# Patient Record
Sex: MALE | Race: BLACK OR AFRICAN AMERICAN | NOT HISPANIC OR LATINO | Employment: UNEMPLOYED | URBAN - METROPOLITAN AREA
[De-identification: names, ages, dates, MRNs, and addresses within clinical notes are randomized per-mention and may not be internally consistent; named-entity substitution may affect disease eponyms.]

---

## 2017-03-17 ENCOUNTER — OFFICE VISIT (OUTPATIENT)
Dept: FAMILY MEDICINE | Facility: CLINIC | Age: 37
End: 2017-03-17
Payer: COMMERCIAL

## 2017-03-17 VITALS
DIASTOLIC BLOOD PRESSURE: 90 MMHG | OXYGEN SATURATION: 97 % | TEMPERATURE: 99 F | SYSTOLIC BLOOD PRESSURE: 130 MMHG | BODY MASS INDEX: 29.83 KG/M2 | HEIGHT: 73 IN | HEART RATE: 64 BPM | WEIGHT: 225.06 LBS

## 2017-03-17 DIAGNOSIS — F43.0 ACUTE STRESS REACTION: Primary | ICD-10-CM

## 2017-03-17 DIAGNOSIS — G47.00 INSOMNIA, UNSPECIFIED TYPE: ICD-10-CM

## 2017-03-17 PROCEDURE — 99213 OFFICE O/P EST LOW 20 MIN: CPT | Mod: S$GLB,,, | Performed by: FAMILY MEDICINE

## 2017-03-17 PROCEDURE — 99999 PR PBB SHADOW E&M-EST. PATIENT-LVL III: CPT | Mod: PBBFAC,,, | Performed by: FAMILY MEDICINE

## 2017-03-17 PROCEDURE — 1160F RVW MEDS BY RX/DR IN RCRD: CPT | Mod: S$GLB,,, | Performed by: FAMILY MEDICINE

## 2017-03-17 RX ORDER — ZOLPIDEM TARTRATE 5 MG/1
5 TABLET ORAL NIGHTLY PRN
Qty: 30 TABLET | Refills: 2 | Status: SHIPPED | OUTPATIENT
Start: 2017-03-17 | End: 2017-05-03 | Stop reason: SDUPTHER

## 2017-03-17 RX ORDER — BUPROPION HYDROCHLORIDE 75 MG/1
75 TABLET ORAL 2 TIMES DAILY
Qty: 60 TABLET | Refills: 5 | Status: SHIPPED | OUTPATIENT
Start: 2017-03-17 | End: 2018-08-08 | Stop reason: SDUPTHER

## 2017-03-17 NOTE — PROGRESS NOTES
"Subjective:       Patient ID: Slime Bradshaw is a 36 y.o. male.    Chief Complaint: Headache and Blood Pressure Check    HPI Comments: Patient presents for concerns about his blood pressure and headaches. He states he and his new wife and him have been having issues. They have been to marriage counseling, but it has been stressful. He has noticed his blood pressure has elevated and he has been feeling down. He has not been sleeping and has been having more headaches. He states his family has been noticing that he has been down as well. He also has been fatigued and has not been sleeping at night.     Review of Systems    Objective:       Vitals:    03/17/17 1120   BP: (!) 130/90   Pulse: 64   Temp: 98.5 °F (36.9 °C)   TempSrc: Oral   SpO2: 97%   Weight: 102.1 kg (225 lb 1.4 oz)   Height: 6' 1" (1.854 m)       Physical Exam   Constitutional: He is oriented to person, place, and time. He appears well-developed and well-nourished. No distress.   HENT:   Head: Normocephalic and atraumatic.   Neck: Normal range of motion. Neck supple.   Neurological: He is alert and oriented to person, place, and time.   Skin: He is not diaphoretic.   Psychiatric: His speech is normal and behavior is normal. Judgment and thought content normal. His mood appears not anxious. His affect is not angry, not blunt, not labile and not inappropriate. Cognition and memory are normal. He exhibits a depressed mood.       Assessment:       1. Acute stress reaction    2. Insomnia, unspecified type        Plan:       Slime was seen today for headache and blood pressure check.    Diagnoses and all orders for this visit:    Acute stress reaction  -     buPROPion (WELLBUTRIN) 75 MG tablet; Take 1 tablet (75 mg total) by mouth 2 (two) times daily.  I started him on Wellbutrin and Ambien.    Insomnia, unspecified type  -     zolpidem (AMBIEN) 5 MG Tab; Take 1 tablet (5 mg total) by mouth nightly as needed.      Return in about 1 month (around " 4/17/2017).

## 2017-03-17 NOTE — PROGRESS NOTES
Subjective:       Patient ID: Slime Bradshaw is a 36 y.o. male.    Chief Complaint: Headache and Blood Pressure Check    HPI  Review of Systems    Objective:      Physical Exam    Assessment:       No diagnosis found.    Plan:       ***

## 2017-05-03 ENCOUNTER — OFFICE VISIT (OUTPATIENT)
Dept: FAMILY MEDICINE | Facility: CLINIC | Age: 37
End: 2017-05-03
Payer: COMMERCIAL

## 2017-05-03 VITALS
SYSTOLIC BLOOD PRESSURE: 128 MMHG | OXYGEN SATURATION: 96 % | HEART RATE: 63 BPM | DIASTOLIC BLOOD PRESSURE: 80 MMHG | TEMPERATURE: 98 F | BODY MASS INDEX: 29.42 KG/M2 | WEIGHT: 222 LBS | HEIGHT: 73 IN

## 2017-05-03 DIAGNOSIS — G47.00 INSOMNIA, UNSPECIFIED TYPE: ICD-10-CM

## 2017-05-03 DIAGNOSIS — L03.90 CELLULITIS, UNSPECIFIED CELLULITIS SITE: Primary | ICD-10-CM

## 2017-05-03 PROCEDURE — 99999 PR PBB SHADOW E&M-EST. PATIENT-LVL III: CPT | Mod: PBBFAC,,, | Performed by: FAMILY MEDICINE

## 2017-05-03 PROCEDURE — 1160F RVW MEDS BY RX/DR IN RCRD: CPT | Mod: S$GLB,,, | Performed by: FAMILY MEDICINE

## 2017-05-03 PROCEDURE — 99214 OFFICE O/P EST MOD 30 MIN: CPT | Mod: S$GLB,,, | Performed by: FAMILY MEDICINE

## 2017-05-03 RX ORDER — ZOLPIDEM TARTRATE 5 MG/1
5 TABLET ORAL NIGHTLY PRN
Qty: 30 TABLET | Refills: 2 | Status: SHIPPED | OUTPATIENT
Start: 2017-05-03 | End: 2017-05-03

## 2017-05-03 RX ORDER — ZOLPIDEM TARTRATE 12.5 MG/1
12.5 TABLET, FILM COATED, EXTENDED RELEASE ORAL NIGHTLY PRN
Qty: 30 TABLET | Refills: 2 | Status: SHIPPED | OUTPATIENT
Start: 2017-05-03 | End: 2017-07-10

## 2017-05-03 RX ORDER — DOXYCYCLINE HYCLATE 100 MG
100 TABLET ORAL EVERY 12 HOURS
Qty: 14 TABLET | Refills: 0 | Status: SHIPPED | OUTPATIENT
Start: 2017-05-03 | End: 2017-05-10

## 2017-05-03 NOTE — PROGRESS NOTES
"Subjective:       Patient ID: Slime Bradshaw is a 36 y.o. male.    Chief Complaint: Right Arm Laceration    HPI Comments: patient is here for  follow up. He is doing better as far as his relationship is concerned, but is still somewhat snappy. He is only taking one Wellbutrin at 75 mg daily rather than a BID dosing. He states he is still irritable.     He admits to sleeping 4-5 hours on Ambien 10 mg at night. He states it helps him fall asleep, but he is not staying asleep.     Review of Systems    Objective:       Vitals:    05/03/17 1009   BP: 128/80   Pulse: 63   Temp: 98.4 °F (36.9 °C)   TempSrc: Oral   SpO2: 96%   Weight: 100.7 kg (222 lb 0.1 oz)   Height: 6' 1" (1.854 m)       Physical Exam   Constitutional: He is oriented to person, place, and time. He appears well-developed and well-nourished. No distress.   HENT:   Head: Normocephalic and atraumatic.   Cardiovascular: Normal rate, regular rhythm and normal heart sounds.  Exam reveals no gallop and no friction rub.    No murmur heard.  Pulmonary/Chest: Effort normal and breath sounds normal. No respiratory distress. He has no wheezes. He has no rales.   Neurological: He is alert and oriented to person, place, and time.   Skin: He is not diaphoretic.            Assessment:       1. Cellulitis, unspecified cellulitis site    2. Insomnia, unspecified type        Plan:         Slime was seen today for right arm laceration.    Diagnoses and all orders for this visit:    Cellulitis, unspecified cellulitis site  -     doxycycline (VIBRA-TABS) 100 MG tablet; Take 1 tablet (100 mg total) by mouth every 12 (twelve) hours.    Insomnia, unspecified type  -     Discontinue: zolpidem (AMBIEN) 5 MG Tab; Take 1 tablet (5 mg total) by mouth nightly as needed.  -     zolpidem (AMBIEN CR) 12.5 MG CR tablet; Take 1 tablet (12.5 mg total) by mouth nightly as needed for Insomnia. Failed treatment on ambien 10 mg at night  Increasing ambien from 10 mg to the extended release of " 12.5 mg daily

## 2017-05-21 ENCOUNTER — HOSPITAL ENCOUNTER (EMERGENCY)
Facility: HOSPITAL | Age: 37
Discharge: HOME | End: 2017-05-21
Payer: MEDICAID

## 2017-05-21 VITALS
RESPIRATION RATE: 18 BRPM | TEMPERATURE: 99 F | DIASTOLIC BLOOD PRESSURE: 72 MMHG | SYSTOLIC BLOOD PRESSURE: 134 MMHG | HEART RATE: 98 BPM | OXYGEN SATURATION: 98 %

## 2017-05-21 DIAGNOSIS — Z53.21 PATIENT LEFT WITHOUT BEING SEEN: ICD-10-CM

## 2017-05-21 PROCEDURE — 99900 PR LEFT WITHOUTBEING SEEN-EMERGENCY: CPT | Mod: ,,, | Performed by: EMERGENCY MEDICINE

## 2017-05-23 ENCOUNTER — TELEPHONE (OUTPATIENT)
Dept: FAMILY MEDICINE | Facility: CLINIC | Age: 37
End: 2017-05-23

## 2017-05-23 ENCOUNTER — OFFICE VISIT (OUTPATIENT)
Dept: FAMILY MEDICINE | Facility: CLINIC | Age: 37
End: 2017-05-23
Payer: COMMERCIAL

## 2017-05-23 VITALS
SYSTOLIC BLOOD PRESSURE: 116 MMHG | TEMPERATURE: 98 F | WEIGHT: 221.31 LBS | OXYGEN SATURATION: 96 % | BODY MASS INDEX: 28.4 KG/M2 | HEIGHT: 74 IN | RESPIRATION RATE: 18 BRPM | HEART RATE: 72 BPM | DIASTOLIC BLOOD PRESSURE: 82 MMHG

## 2017-05-23 DIAGNOSIS — R51.9 HEADACHE, UNSPECIFIED HEADACHE TYPE: ICD-10-CM

## 2017-05-23 DIAGNOSIS — M54.50 CHRONIC MIDLINE LOW BACK PAIN WITHOUT SCIATICA: Primary | ICD-10-CM

## 2017-05-23 DIAGNOSIS — G89.29 CHRONIC MIDLINE LOW BACK PAIN WITHOUT SCIATICA: Primary | ICD-10-CM

## 2017-05-23 DIAGNOSIS — M21.70 LEG LENGTH DISCREPANCY: ICD-10-CM

## 2017-05-23 PROCEDURE — 1160F RVW MEDS BY RX/DR IN RCRD: CPT | Mod: S$GLB,,, | Performed by: FAMILY MEDICINE

## 2017-05-23 PROCEDURE — 96372 THER/PROPH/DIAG INJ SC/IM: CPT | Mod: S$GLB,,, | Performed by: FAMILY MEDICINE

## 2017-05-23 PROCEDURE — 99214 OFFICE O/P EST MOD 30 MIN: CPT | Mod: 25,S$GLB,, | Performed by: FAMILY MEDICINE

## 2017-05-23 PROCEDURE — 99999 PR PBB SHADOW E&M-EST. PATIENT-LVL III: CPT | Mod: PBBFAC,,, | Performed by: FAMILY MEDICINE

## 2017-05-23 RX ORDER — METHOCARBAMOL 500 MG/1
500 TABLET, FILM COATED ORAL 4 TIMES DAILY
Qty: 30 TABLET | Refills: 0 | Status: SHIPPED | OUTPATIENT
Start: 2017-05-23 | End: 2017-06-02

## 2017-05-23 RX ORDER — KETOROLAC TROMETHAMINE 30 MG/ML
60 INJECTION, SOLUTION INTRAMUSCULAR; INTRAVENOUS
Status: COMPLETED | OUTPATIENT
Start: 2017-05-23 | End: 2017-05-23

## 2017-05-23 RX ORDER — CYCLOBENZAPRINE HCL 5 MG
5 TABLET ORAL 3 TIMES DAILY PRN
Qty: 30 TABLET | Refills: 0 | Status: SHIPPED | OUTPATIENT
Start: 2017-05-23 | End: 2017-06-02

## 2017-05-23 RX ADMIN — KETOROLAC TROMETHAMINE 60 MG: 30 INJECTION, SOLUTION INTRAMUSCULAR; INTRAVENOUS at 10:05

## 2017-05-23 NOTE — TELEPHONE ENCOUNTER
Patient states that he took flexeril in the past and it makes him sleepy and it lingers for a few days.  Patient would like to have something else sent to pharmacy.  Please advise.

## 2017-05-23 NOTE — TELEPHONE ENCOUNTER
Attempted to contact the patient regarding a referral to Physical Medicine, unable to leave a message

## 2017-05-23 NOTE — LETTER
May 24, 2017    Slime Bradshaw  [address] [facility] [place] [address]             [facility]  [address] [facility] [address]  Phone: [phone]  Fax: [phone] Dear Mr. Bradshaw:    I have been unable to reach you by phone for your appointment to Physical Medicine .  Please call me at the clinic [phone] to book your appointment.        If you have any questions or concerns, please don't hesitate to call.    Sincerely,        Corrie Barker MA

## 2017-05-23 NOTE — TELEPHONE ENCOUNTER
----- Message from Alesha Gonzalez sent at 5/23/2017 11:22 AM CDT -----  Contact: [place] spouse  Pt spouse is requesting a different med. Pt can not take cyclobenzaprine (FLEXERIL) 5 MG tablet. [phone]          Thanks

## 2017-05-23 NOTE — PROGRESS NOTES
"Subjective:       Patient ID: Slime Bradshaw is a 36 y.o. male.    Chief Complaint: ER Follow up (Backpain/headaches.) and Back Pain (Headaches have improved but backpain still present.)    Patient presents for follow-up on his headaches. He was seen in the ED.  He states it has improved and it is 2/10. It is bitemporal. He is on the Ambien 5 mg and is sleeping well. He has not had eye glasses in the last 6 months. He started wearing them yesterday. He has no nausea or vomiting with the headaches. He had one episode of vomiting, but that was 3 days ago. His wife states he works outside and was not hydrated well.     He states he has lower back pain. He states it is mainly in the mid of his back. He has no radiation down his legs. He dneies dysuria or hematuria. It flared up int he last 4 days. He was on a muscle relaxer tizanidine for years. He has been off of this for 2 years. He has one leg that is longer than the other.       Review of Systems    Objective:       Vitals:    05/23/17 0943   BP: 116/82   Pulse: 72   Resp: 18   Temp: 98.2 °F (36.8 °C)   TempSrc: Oral   SpO2: 96%   Weight: 100.4 kg (221 lb 5.5 oz)   Height: 6' 2" (1.88 m)       Physical Exam   Constitutional: He appears well-developed and well-nourished. No distress.   HENT:   Head: Normocephalic and atraumatic.   Neck: Normal range of motion. Neck supple.   Cardiovascular: Normal rate, regular rhythm and normal heart sounds.  Exam reveals no gallop and no friction rub.    No murmur heard.  Pulmonary/Chest: Effort normal and breath sounds normal. No respiratory distress. He has no wheezes. He has no rales.   Musculoskeletal:        Lumbar back: He exhibits tenderness, pain and spasm. He exhibits normal range of motion and no bony tenderness.   Skin: He is not diaphoretic.       Assessment:       1. Chronic midline low back pain without sciatica    2. Leg length discrepancy    3. Headache, unspecified headache type        Plan:       Slime was seen " today for er follow up and back pain.    Diagnoses and all orders for this visit:    Chronic midline low back pain without sciatica  -     cyclobenzaprine (FLEXERIL) 5 MG tablet; Take 1 tablet (5 mg total) by mouth 3 (three) times daily as needed.  -     Ambulatory referral to Physical Medicine Rehab  -     ketorolac injection 60 mg; Inject 2 mLs (60 mg total) into the muscle one time.    Leg length discrepancy  -     Ambulatory referral to Physical Medicine Rehab  -     ketorolac injection 60 mg; Inject 2 mLs (60 mg total) into the muscle one time.    Headache, unspecified headache type  -     ketorolac injection 60 mg; Inject 2 mLs (60 mg total) into the muscle one time.

## 2017-06-13 RX ORDER — ZOLPIDEM TARTRATE 5 MG/1
TABLET ORAL
Qty: 30 TABLET | Refills: 0 | OUTPATIENT
Start: 2017-06-13

## 2017-07-10 DIAGNOSIS — G47.00 INSOMNIA, UNSPECIFIED TYPE: ICD-10-CM

## 2017-07-10 NOTE — TELEPHONE ENCOUNTER
Spoke to patient's states that patient was put on ambien 12.5mg but he thinks it is too strong, it is making him groggy the next day and he does not like to way it is making him feel.  Patient is requesting to be put back on ambien 5 mg.  Please advise.

## 2017-07-10 NOTE — TELEPHONE ENCOUNTER
----- Message from Malaika Carlos sent at 7/10/2017  1:47 PM CDT -----  Contact: [place] -spouse  [place] is requesting a refill for zolpidem (AMBIEN CR) 12.5 MG CR tablet. Please change to 0.5 mg. [phone]

## 2017-07-12 RX ORDER — ZOLPIDEM TARTRATE 5 MG/1
TABLET ORAL
Qty: 30 TABLET | Refills: 2 | Status: SHIPPED | OUTPATIENT
Start: 2017-07-12 | End: 2017-11-29 | Stop reason: SDUPTHER

## 2017-07-18 RX ORDER — ZOLPIDEM TARTRATE 5 MG/1
5 TABLET ORAL NIGHTLY PRN
Qty: 30 TABLET | Refills: 1 | Status: SHIPPED | OUTPATIENT
Start: 2017-07-18 | End: 2017-11-29 | Stop reason: SDUPTHER

## 2017-07-19 NOTE — TELEPHONE ENCOUNTER
Called for patient/ stated that they have already picked up a script for ambien a few days ago from clinic

## 2017-10-12 RX ORDER — ZOLPIDEM TARTRATE 5 MG/1
TABLET ORAL
Qty: 30 TABLET | Refills: 0 | OUTPATIENT
Start: 2017-10-12

## 2017-11-13 RX ORDER — ZOLPIDEM TARTRATE 12.5 MG/1
TABLET, FILM COATED, EXTENDED RELEASE ORAL
Qty: 30 TABLET | Refills: 0 | OUTPATIENT
Start: 2017-11-13

## 2017-11-29 ENCOUNTER — OFFICE VISIT (OUTPATIENT)
Dept: FAMILY MEDICINE | Facility: CLINIC | Age: 37
End: 2017-11-29
Payer: COMMERCIAL

## 2017-11-29 VITALS
HEIGHT: 74 IN | SYSTOLIC BLOOD PRESSURE: 118 MMHG | TEMPERATURE: 98 F | OXYGEN SATURATION: 98 % | HEART RATE: 63 BPM | WEIGHT: 225.75 LBS | BODY MASS INDEX: 28.97 KG/M2 | DIASTOLIC BLOOD PRESSURE: 80 MMHG

## 2017-11-29 DIAGNOSIS — G47.00 INSOMNIA, UNSPECIFIED TYPE: ICD-10-CM

## 2017-11-29 DIAGNOSIS — M54.50 LOW BACK PAIN WITHOUT SCIATICA, UNSPECIFIED BACK PAIN LATERALITY, UNSPECIFIED CHRONICITY: Primary | ICD-10-CM

## 2017-11-29 PROCEDURE — 99999 PR PBB SHADOW E&M-EST. PATIENT-LVL III: CPT | Mod: PBBFAC,,, | Performed by: FAMILY MEDICINE

## 2017-11-29 PROCEDURE — 96372 THER/PROPH/DIAG INJ SC/IM: CPT | Mod: S$GLB,,, | Performed by: FAMILY MEDICINE

## 2017-11-29 PROCEDURE — 99214 OFFICE O/P EST MOD 30 MIN: CPT | Mod: 25,S$GLB,, | Performed by: FAMILY MEDICINE

## 2017-11-29 RX ORDER — ZOLPIDEM TARTRATE 5 MG/1
5 TABLET ORAL NIGHTLY PRN
Qty: 30 TABLET | Refills: 3 | Status: SHIPPED | OUTPATIENT
Start: 2017-11-29 | End: 2018-08-08 | Stop reason: SDUPTHER

## 2017-11-29 RX ORDER — CYCLOBENZAPRINE HCL 5 MG
5 TABLET ORAL 3 TIMES DAILY PRN
Qty: 30 TABLET | Refills: 2 | Status: SHIPPED | OUTPATIENT
Start: 2017-11-29 | End: 2017-11-29

## 2017-11-29 RX ORDER — METHYLPREDNISOLONE 4 MG/1
TABLET ORAL
Qty: 1 PACKAGE | Refills: 0 | Status: SHIPPED | OUTPATIENT
Start: 2017-11-29 | End: 2017-12-20

## 2017-11-29 RX ORDER — METHYLPREDNISOLONE ACETATE 40 MG/ML
40 INJECTION, SUSPENSION INTRA-ARTICULAR; INTRALESIONAL; INTRAMUSCULAR; SOFT TISSUE
Status: COMPLETED | OUTPATIENT
Start: 2017-11-29 | End: 2017-11-29

## 2017-11-29 RX ORDER — METHYLPREDNISOLONE ACETATE 80 MG/ML
40 INJECTION, SUSPENSION INTRA-ARTICULAR; INTRALESIONAL; INTRAMUSCULAR; SOFT TISSUE
Status: DISCONTINUED | OUTPATIENT
Start: 2017-11-29 | End: 2017-11-29

## 2017-11-29 RX ORDER — METHOCARBAMOL 500 MG/1
500 TABLET, FILM COATED ORAL 4 TIMES DAILY
Qty: 40 TABLET | Refills: 0 | Status: SHIPPED | OUTPATIENT
Start: 2017-11-29 | End: 2017-11-29

## 2017-11-29 RX ORDER — METHOCARBAMOL 750 MG/1
750 TABLET, FILM COATED ORAL 3 TIMES DAILY PRN
Qty: 40 TABLET | Refills: 1 | Status: SHIPPED | OUTPATIENT
Start: 2017-11-29 | End: 2017-12-09

## 2017-11-29 RX ADMIN — METHYLPREDNISOLONE ACETATE 40 MG: 40 INJECTION, SUSPENSION INTRA-ARTICULAR; INTRALESIONAL; INTRAMUSCULAR; SOFT TISSUE at 09:11

## 2017-11-29 NOTE — PROGRESS NOTES
"Subjective:       Patient ID: Slime Bradshaw is a 37 y.o. male.    Chief Complaint: Back Pain    Back Pain   This is a chronic problem. The current episode started more than 1 month ago. The problem has been waxing and waning since onset. The pain is present in the lumbar spine and thoracic spine. The quality of the pain is described as aching (spasms). The pain does not radiate. The pain is moderate. The symptoms are aggravated by standing and lying down. Pertinent negatives include no bladder incontinence, bowel incontinence, leg pain, numbness, tingling or weakness. He has tried chiropractic manipulation (he had treatment with ibuprofen and a chirptractor. ) for the symptoms.   He has insomnia and is doing well on his medication.  He denies side effects from his medications.   Review of Systems   Gastrointestinal: Negative for bowel incontinence.   Genitourinary: Negative for bladder incontinence.   Musculoskeletal: Positive for back pain.   Neurological: Negative for tingling, weakness and numbness.       Objective:       Vitals:    11/29/17 0843   BP: 118/80   Pulse: 63   Temp: 98.2 °F (36.8 °C)   TempSrc: Oral   SpO2: 98%   Weight: 102.4 kg (225 lb 12 oz)   Height: 6' 2" (1.88 m)       Physical Exam   Constitutional: He is oriented to person, place, and time. He appears well-developed and well-nourished. No distress.   HENT:   Head: Normocephalic and atraumatic.   Eyes: Conjunctivae are normal.   Neck: Normal range of motion. Neck supple.   Cardiovascular: Normal rate, regular rhythm and normal heart sounds.  Exam reveals no gallop and no friction rub.    No murmur heard.  Pulmonary/Chest: Effort normal and breath sounds normal. No respiratory distress. He has no wheezes. He has no rales.   Musculoskeletal:        Lumbar back: He exhibits tenderness and spasm. He exhibits normal range of motion, no swelling, no edema, no deformity and no pain.        Back:    Neurological: He is alert and oriented to person, " place, and time.   Skin: He is not diaphoretic.   Psychiatric: He has a normal mood and affect.       Assessment:       1. Low back pain without sciatica, unspecified back pain laterality, unspecified chronicity    2. Insomnia, unspecified type        Plan:       Slime was seen today for back pain.    Diagnoses and all orders for this visit:    Low back pain without sciatica, unspecified back pain laterality, unspecified chronicity  -     methylPREDNISolone (MEDROL DOSEPACK) 4 mg tablet; use as directed  -     Discontinue: cyclobenzaprine (FLEXERIL) 5 MG tablet; Take 1 tablet (5 mg total) by mouth 3 (three) times daily as needed.  -     Discontinue: methylPREDNISolone acetate injection 40 mg; Inject 0.5 mLs (40 mg total) into the muscle one time.  -     Discontinue: methocarbamol (ROBAXIN) 500 MG Tab; Take 1 tablet (500 mg total) by mouth 4 (four) times daily.  -     methocarbamol (ROBAXIN) 750 MG Tab; Take 1 tablet (750 mg total) by mouth 3 (three) times daily as needed.  -     Ambulatory referral to Physical Medicine Rehab  -     methylPREDNISolone acetate injection 40 mg; Inject 1 mL (40 mg total) into the muscle one time.  Given robaxin 750 mg, medrol dose pack and steroid shot today.    Insomnia, unspecified type  -     zolpidem (AMBIEN) 5 MG Tab; Take 1 tablet (5 mg total) by mouth nightly as needed.  Stable. Refilled meds.

## 2017-11-29 NOTE — PROGRESS NOTES
Administered Depo Medrol 40 mg IM to left ventrogluteal.  Patient tolerated injection well, no adverse reactions noted.

## 2017-12-29 ENCOUNTER — INITIAL CONSULT (OUTPATIENT)
Dept: PHYSICAL MEDICINE AND REHAB | Facility: CLINIC | Age: 37
End: 2017-12-29
Payer: COMMERCIAL

## 2017-12-29 VITALS
BODY MASS INDEX: 29.44 KG/M2 | WEIGHT: 229.38 LBS | SYSTOLIC BLOOD PRESSURE: 116 MMHG | HEIGHT: 74 IN | HEART RATE: 58 BPM | DIASTOLIC BLOOD PRESSURE: 67 MMHG

## 2017-12-29 DIAGNOSIS — M54.50 CHRONIC LEFT-SIDED LOW BACK PAIN WITHOUT SCIATICA: ICD-10-CM

## 2017-12-29 DIAGNOSIS — G89.29 CHRONIC LEFT-SIDED LOW BACK PAIN WITHOUT SCIATICA: ICD-10-CM

## 2017-12-29 DIAGNOSIS — M53.3 SACROILIAC JOINT DYSFUNCTION OF LEFT SIDE: Primary | ICD-10-CM

## 2017-12-29 PROCEDURE — 99999 PR PBB SHADOW E&M-EST. PATIENT-LVL III: CPT | Mod: PBBFAC,,, | Performed by: PHYSICAL MEDICINE & REHABILITATION

## 2017-12-29 PROCEDURE — 99204 OFFICE O/P NEW MOD 45 MIN: CPT | Mod: S$GLB,,, | Performed by: PHYSICAL MEDICINE & REHABILITATION

## 2017-12-29 RX ORDER — TRAMADOL HYDROCHLORIDE 50 MG/1
50 TABLET ORAL EVERY 8 HOURS PRN
Qty: 90 TABLET | Refills: 0 | Status: SHIPPED | OUTPATIENT
Start: 2017-12-29 | End: 2017-12-29 | Stop reason: DRUGHIGH

## 2017-12-29 RX ORDER — MELOXICAM 15 MG/1
15 TABLET ORAL DAILY PRN
Qty: 30 TABLET | Refills: 2 | Status: SHIPPED | OUTPATIENT
Start: 2017-12-29 | End: 2018-01-28

## 2017-12-29 RX ORDER — GABAPENTIN 100 MG/1
CAPSULE ORAL
Qty: 150 CAPSULE | Refills: 2 | Status: SHIPPED | OUTPATIENT
Start: 2017-12-29 | End: 2018-05-07 | Stop reason: SDUPTHER

## 2017-12-29 RX ORDER — TRAMADOL HYDROCHLORIDE 50 MG/1
50 TABLET ORAL EVERY 8 HOURS PRN
Qty: 60 TABLET | Refills: 0 | Status: SHIPPED | OUTPATIENT
Start: 2017-12-29 | End: 2018-02-07 | Stop reason: SDUPTHER

## 2017-12-29 NOTE — PROGRESS NOTES
Subjective:       Patient ID: Slime Bradshaw is a 37 y.o. male.    Chief Complaint: Back Pain    HPI   Slime Ledezma is 36 y/o male who is coming first time to clinic for   Worsening of chronic back pain.   Referred by PCP, dr. Lozoya.     Back Pain Description:  Length: pain is chronic pain. Length >  15  Year(s),.   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   He states that he at that time reported for the same pain.   He was later in college basket bal;l, with multiple falls, he is now doing  gym, and weight , can bench press 250 lbs, lying down, can do free style ~ 100 lbs.   Multiple  past, recent injury, falls.  Intensity:  CURRENT   7/10,  AVERAGE  Pain   5-6/10. at BEST   1-2/10 , At WORST   7-8/10 on the WORST day.   Location: pain is localized at Rt side of back.   It is only  Back pain and no leg pain.  Radiation: Positive to buttocks. Negative to legs.   Timing : constant , + morning pain, w/o stiffness ,and worse with activity, in evening  QUALITY:  Dull pain with sharp pain with  m movement,   NO neuropathic : burning, tingling, numbness  Negative leg weakness.   Worsening factors: prolong sitting,. Being in same position, leaning toward left side and sitting feel better   Alleviating factors: lying down has a pain  Symptoms interfere with daily activity, sleeping and work.   Current medications: Steroids, Robaxin. Sometimes in past Zanaflex help, Flexeril   Makes him too sleepy.;   Failed medications:  Ibuprofen 800 mg,.  Prior procedures. Torn ACL in Rt knee while playing basket ball.   PT/OT: none  Patient denies night fever/night sweats, bowel incontinence, significant weight loss and significant motor weakness ( red flags).  Patient denies any suicidal or homicidal ideations.   He is here for evaluation and treatment.    No past medical history on file.    Past Surgical History:   Procedure Laterality Date    KNEE ARTHROSCOPY W/ ACL RECONSTRUCTION       right    WISDOM TOOTH EXTRACTION         Family History   Problem Relation Age of Onset    Cancer Father      throat-- smoker    Hypertension Mother     Diabetes Mother     Hypertension Brother        Social History     Social History    Marital status: Single     Spouse name: N/A    Number of children: N/A    Years of education: N/A     Social History Main Topics    Smoking status: Never Smoker    Smokeless tobacco: None    Alcohol use 0.0 - 1.0 oz/week     0 - 2 drink(s) per week    Drug use: No    Sexual activity: Yes     Partners: Female     Other Topics Concern    None     Social History Narrative    Works as a  for [employer]       Current Outpatient Prescriptions   Medication Sig Dispense Refill    buPROPion (WELLBUTRIN) 75 MG tablet Take 1 tablet (75 mg total) by mouth 2 (two) times daily. (Patient taking differently: Take 75 mg by mouth 2 (two) times daily. ) 60 tablet 5    zolpidem (AMBIEN) 5 MG Tab Take 1 tablet (5 mg total) by mouth nightly as needed. 30 tablet 3    gabapentin (NEURONTIN) 100 MG capsule Take one cap in am , pm, and 3 cap at bedtime 150 capsule 2    meloxicam (MOBIC) 15 MG tablet Take 1 tablet (15 mg total) by mouth daily as needed for Pain. 30 tablet 2    traMADol (ULTRAM) 50 mg tablet Take 1 tablet (50 mg total) by mouth every 8 (eight) hours as needed for Pain. 60 tablet 0     No current facility-administered medications for this visit.        Review of patient's allergies indicates:   Allergen Reactions    Hydrocodone Itching     Review of Systems   Constitutional: Negative for appetite change and fatigue.   Eyes: Negative for visual disturbance.   Respiratory: Negative for shortness of breath.    Cardiovascular: Negative for chest pain.   Gastrointestinal: Negative for constipation and diarrhea.   Genitourinary: Negative for dysuria, frequency and urgency.   Musculoskeletal: Positive for back pain. Negative for arthralgias, gait problem, joint  swelling, myalgias, neck pain and neck stiffness.   Neurological: Negative for dizziness, tremors, weakness, numbness and headaches.   Psychiatric/Behavioral: Negative for dysphoric mood.   All other systems reviewed and are negative.        Objective:      Physical Exam    GENERAL: The patient is alert, oriented, pleasant.  HEENT: PERRLA  NECK: supple, no masses,    CV: S1S2, RRR, no murmurs, rales.  LUNGS: CTA bilateral.   ABDOMEN: soft, non-tender, non distended, bowel sounds nl.  EXTREMITIES: no edema, +2 pulses DP, b/l  SKIN: no skin rash, no skin breakdowns.  MUSCULOSKELETAL:   Gait : normal, walks with no AD.  Walks on toes/heels w/o difficulties.  Cervical spine: full AROM in cervical spine   Lumbar spine, decreased active range of motion in all planes, flexion to 90 degrees, with pain on extention, that is limited to 0.  Side bending and rotation to 35-40 degrees, b/l, with pain on left side bellow the waist line.  Positive facet loading on left.   Straight leg raising Negative bilaterally.   Minimal movement of Left SI joint compare to Right.  Full range of motion in all joints x4 extremities.   Muscle strength 5/5 throughout x4 extremities.   No  joint laxity throughout x4 extremities.   NEUROLOGIC: Cranial nerves II through XII intact.   Deep tendon reflexes is normal, +2 in the upper and lower extremities bilaterally.   Muscle tone is normal.   Sensory is intact to light touch and pinprick throughout x4 extremities.       Assessment:       1. Chronic left-sided low back pain without sciatica        Plan:       Sacroiliac joint dysfunction of left side  -     Ambulatory Referral to Physical/Occupational Therapy  -     meloxicam (MOBIC) 15 MG tablet; Take 1 tablet (15 mg total) by mouth daily as needed for Pain.  Dispense: 30 tablet; Refill: 2  -     Discontinue: traMADol (ULTRAM) 50 mg tablet; Take 1 tablet (50 mg total) by mouth every 8 (eight) hours as needed for Pain.  Dispense: 90 tablet; Refill:  0  -     traMADol (ULTRAM) 50 mg tablet; Take 1 tablet (50 mg total) by mouth every 8 (eight) hours as needed for Pain.  Dispense: 60 tablet; Refill: 0    Chronic left-sided low back pain without sciatica  -     Ambulatory Referral to Physical/Occupational Therapy  -     meloxicam (MOBIC) 15 MG tablet; Take 1 tablet (15 mg total) by mouth daily as needed for Pain.  Dispense: 30 tablet; Refill: 2  -     Discontinue: traMADol (ULTRAM) 50 mg tablet; Take 1 tablet (50 mg total) by mouth every 8 (eight) hours as needed for Pain.  Dispense: 90 tablet; Refill: 0  -     traMADol (ULTRAM) 50 mg tablet; Take 1 tablet (50 mg total) by mouth every 8 (eight) hours as needed for Pain.  Dispense: 60 tablet; Refill: 0    Other orders  -     gabapentin (NEURONTIN) 100 MG capsule; Take one cap in am , pm, and 3 cap at bedtime  Dispense: 150 capsule; Refill: 2      RTC in 6 weeks.     Total time spent face to face with patient was 45 minutes.   More than 50% of that time was spent in counseling on diagnosis , prognosis and treatment options.   I also  patient  on common and most usual side effect of prescribed medications.    I reviewed Primary care , and other specialty's notes to better coordinate patient's  care.   All questions were answered, and patient voiced understanding.

## 2017-12-29 NOTE — LETTER
December 31, 2017      Lindsey Lozoya MD  [address] [address]           [facility]  [address]  [facility] [address]  Phone: [phone]          Patient: Slime Bradshaw   MR Number: [id number]   YOB: 1980   Date of Visit: 12/29/2017       Dear Dr. Lindsey Lozoya:    Thank you for referring Slime Bradshaw to me for evaluation. Attached you will find relevant portions of my assessment and plan of care.    If you have questions, please do not hesitate to call me. I look forward to following Slime Bradshaw along with you.    Sincerely,    Yasmeen Narvaez MD    Enclosure  CC:  No Recipients    If you would like to receive this communication electronically, please contact [email or web address] or [phone] to request more information on [employer] access.    For providers and/or their staff who would like to refer a patient to [facility], please contact us through our one-stop-shop provider referral line, [facility], at [phone].    If you feel you have received this communication in error or would no longer like to receive these types of communications, please e-mail [email or web address]

## 2018-01-02 ENCOUNTER — CLINICAL SUPPORT (OUTPATIENT)
Dept: REHABILITATION | Facility: HOSPITAL | Age: 38
End: 2018-01-02
Attending: PHYSICAL MEDICINE & REHABILITATION
Payer: MEDICAID

## 2018-01-02 DIAGNOSIS — M54.50 LUMBOSACRAL PAIN, CHRONIC: ICD-10-CM

## 2018-01-02 DIAGNOSIS — G89.29 LUMBOSACRAL PAIN, CHRONIC: ICD-10-CM

## 2018-01-02 PROCEDURE — 97140 MANUAL THERAPY 1/> REGIONS: CPT | Mod: PO

## 2018-01-02 PROCEDURE — 97162 PT EVAL MOD COMPLEX 30 MIN: CPT | Mod: PO

## 2018-01-02 PROCEDURE — 97110 THERAPEUTIC EXERCISES: CPT | Mod: PO
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
## 2018-01-04 ENCOUNTER — CLINICAL SUPPORT (OUTPATIENT)
Dept: REHABILITATION | Facility: HOSPITAL | Age: 38
End: 2018-01-04
Attending: PHYSICAL MEDICINE & REHABILITATION
Payer: MEDICAID

## 2018-01-04 DIAGNOSIS — G89.29 CHRONIC LEFT-SIDED LOW BACK PAIN WITHOUT SCIATICA: ICD-10-CM

## 2018-01-04 DIAGNOSIS — M54.50 CHRONIC LEFT-SIDED LOW BACK PAIN WITHOUT SCIATICA: ICD-10-CM

## 2018-01-04 DIAGNOSIS — M54.50 LUMBOSACRAL PAIN, CHRONIC: ICD-10-CM

## 2018-01-04 DIAGNOSIS — G89.29 LUMBOSACRAL PAIN, CHRONIC: ICD-10-CM

## 2018-01-04 PROCEDURE — 97140 MANUAL THERAPY 1/> REGIONS: CPT | Mod: PO

## 2018-01-04 PROCEDURE — 97110 THERAPEUTIC EXERCISES: CPT | Mod: PO
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
## 2018-01-08 ENCOUNTER — CLINICAL SUPPORT (OUTPATIENT)
Dept: REHABILITATION | Facility: HOSPITAL | Age: 38
End: 2018-01-08
Attending: PHYSICAL MEDICINE & REHABILITATION
Payer: MEDICAID

## 2018-01-08 DIAGNOSIS — M54.50 LUMBOSACRAL PAIN, CHRONIC: ICD-10-CM

## 2018-01-08 DIAGNOSIS — G89.29 LUMBOSACRAL PAIN, CHRONIC: ICD-10-CM

## 2018-01-08 DIAGNOSIS — G89.29 CHRONIC LEFT-SIDED LOW BACK PAIN WITHOUT SCIATICA: ICD-10-CM

## 2018-01-08 DIAGNOSIS — M54.50 CHRONIC LEFT-SIDED LOW BACK PAIN WITHOUT SCIATICA: ICD-10-CM

## 2018-01-08 PROCEDURE — 97014 ELECTRIC STIMULATION THERAPY: CPT | Mod: PO

## 2018-01-08 PROCEDURE — 97140 MANUAL THERAPY 1/> REGIONS: CPT | Mod: PO

## 2018-01-08 PROCEDURE — 97110 THERAPEUTIC EXERCISES: CPT | Mod: PO

## 2018-01-08 NOTE — PROGRESS NOTES
Physical Therapy Daily Note     Name: Slime Bradshaw  Clinic Number: [id number]  Diagnosis:   Encounter Diagnoses   Name Primary?    Lumbosacral pain, chronic     Chronic left-sided low back pain without sciatica      Physician: Yasmeen Narvaez MD Eval date: 1/2/18  Visit #:3    Time In: 8:00  Time Out: 9:05  Treatment time: 65    Subjective     Pt reports: chronic (L) lower back/SI/Piriformis pain. States that pain is present all of the time. Worse with prolonged walking, standing and sitting. Increased pain over the weekend. States that he uses Tramadol and Gabapentin for pain meds. Also, reports previous trial of chiropractor TX's without relief.  Pain Scale: Slime rates pain on a scale of 0-10 to be 5 currently.    Objective     Slime received individual therapeutic exercises to develop Lumbar/corestrength, endurance, ROM, flexibility, posture and core stabilization for 35 minutes including:  TM ambulation x 5 minutes @ 3.0 mph  Supine hamstring stretch with belt assist 3 x 20 sec  Supine sciatic nerve glide on (L) x 20  pirifomis stretch 3 x 20 sec  (Attempted LTR stretch stopped due to increased pain)  TrA set x 20 with 5 sec hold x 20  Tra set with Hooklying abd/ER with GTB x 20  TrA set with Hip add isometric x 20   Bridging 2 x 10  Prone press ups x 10 with 5 sec hold  Attempted prone hip extension stopped after 2 reps due to increased discomfort    Slime received the following manual therapy techniques: Soft tissue Mobilization were applied to the: lumbar paraspinals/piriformis for 15 minutes including:  Long axis distraction (L)LE 5 x 20 sec ( fair relief)  Gentle lumbar distraction in hooklying   STM (L) lumbar paraspinals/piriformis area  Cup massage (L) lumbar paraspinals    Patient receives IFC electrical stimulation for pain control applied to (L) lower back/lumbar paraspinals ,   frequency = 80-150Hz,  @ 40% scan,  for 15 minutes.    In  conjunction with moist heat in prone     Patient education: Patient educated to continue with previously issued HEP to tolerance. Educated on TrA setting ex for core stabilization with good understanding. He was also provided with an informational handout re: dry needling which could be considered in the future.  No spiritual or educational barriers to learning      Assessment     Patient tolerated randa Tx fair.  Some increased (L) lower back discomfort reported with attempt at LTR stretch to (R) and with attempt at prone hip ext and these ex's were stopped. Otherwise, he was able to perform the above ex's/activities without worsening of symptoms although he remains guarded with transitional movements due to increased pain. Slight temporary decrease in symptoms with (L)LE long axis distraction technique today and modalities in clinic but has not had any long lasting relief. He was educated on possible dry needling Tx's which he was open to and he ws provided with an informational handout about this treatment--WIll discuss with primary therapist.  This is a 37 y.o. male referred to outpatient physical therapy and presents with a medical diagnosis of   Encounter Diagnoses   Name Primary?    Lumbosacral pain, chronic     Chronic left-sided low back pain without sciatica     and demonstrates limitations as described in the problem list.  Pt will continue to benefit from skilled outpatient physical therapy to address the deficits listed in the problem list, provide pt/family education and to maximize pt's level of independence in the home and community environment.     Goals to be met in 8 weeks:   1) Pt will be independent and report consistency in  performing HEP to maximize benefit from PT  2) Pt will reprt  lumbosacral ROM is no longer stuff and improve performance of ADLS  3) Pt will report use of home modalities for pain management.   4) Pt will report one degree less of pain  5/7 days  5) Pt will report  interrupted sleep no more than twice a night.  6) Pt will report improved ability to return to normal activity  Re-assessment due on or before:  2/2/18   Plan     Continue with established Plan of Care towards PT goals.    Therapist: Gregory Rivera, PTA  1/8/2018

## 2018-01-10 ENCOUNTER — CLINICAL SUPPORT (OUTPATIENT)
Dept: REHABILITATION | Facility: HOSPITAL | Age: 38
End: 2018-01-10
Attending: PHYSICAL MEDICINE & REHABILITATION
Payer: MEDICAID

## 2018-01-10 DIAGNOSIS — M54.50 CHRONIC LEFT-SIDED LOW BACK PAIN WITHOUT SCIATICA: ICD-10-CM

## 2018-01-10 DIAGNOSIS — M54.50 LUMBOSACRAL PAIN, CHRONIC: ICD-10-CM

## 2018-01-10 DIAGNOSIS — G89.29 CHRONIC LEFT-SIDED LOW BACK PAIN WITHOUT SCIATICA: ICD-10-CM

## 2018-01-10 DIAGNOSIS — G89.29 LUMBOSACRAL PAIN, CHRONIC: ICD-10-CM

## 2018-01-10 PROCEDURE — 97140 MANUAL THERAPY 1/> REGIONS: CPT | Mod: PO

## 2018-01-10 PROCEDURE — 97110 THERAPEUTIC EXERCISES: CPT | Mod: PO
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
## 2018-01-17 ENCOUNTER — CLINICAL SUPPORT (OUTPATIENT)
Dept: REHABILITATION | Facility: HOSPITAL | Age: 38
End: 2018-01-17
Attending: PHYSICAL MEDICINE & REHABILITATION
Payer: MEDICAID

## 2018-01-17 DIAGNOSIS — G89.29 LUMBOSACRAL PAIN, CHRONIC: ICD-10-CM

## 2018-01-17 DIAGNOSIS — G89.29 CHRONIC LEFT-SIDED LOW BACK PAIN WITHOUT SCIATICA: ICD-10-CM

## 2018-01-17 DIAGNOSIS — M54.50 CHRONIC LEFT-SIDED LOW BACK PAIN WITHOUT SCIATICA: ICD-10-CM

## 2018-01-17 DIAGNOSIS — M54.50 LUMBOSACRAL PAIN, CHRONIC: ICD-10-CM

## 2018-01-17 PROCEDURE — 97110 THERAPEUTIC EXERCISES: CPT | Mod: PO

## 2018-01-17 PROCEDURE — 97140 MANUAL THERAPY 1/> REGIONS: CPT | Mod: PO

## 2018-01-17 NOTE — PROGRESS NOTES
Physical Therapy Daily Note     Name: Slime Bradshaw  Clinic Number: [id number]  Diagnosis:   Encounter Diagnoses   Name Primary?    Lumbosacral pain, chronic     Chronic left-sided low back pain without sciatica      Physician: Yasmeen Narvaez MD Eval date: 1/2/18  Visit #:5    Time In: 8:15  Time Out: 9:30  Treatment time: 50    Subjective     Pt reports: chronic (L) lower back/SI/Piriformis pain. States that pain is present all of the time. Worse with prolonged walking, standing and sitting. States that pain varies from moderate to severe. He reports slight decrease in symptoms for 1 day following initial trial of dry needling last Tx.   Pain Scale: Slime rates pain on a scale of 0-10 to be 5 currently.    Objective   Appears to have leg length difference with (R)LE shorter than (L) ~  1.5 Cm's. ( Patient states that he was advised of this as a child).     Slime received individual therapeutic exercises to develop Lumbar/corestrength, endurance, ROM, flexibility, posture and core stabilization for 30 minutes including:  TM ambulation x 5 minutes @ 3.0 mph--NP  Supine hamstring stretch with belt assist 3 x 20 sec  Supine sciatic nerve glide on (L) x 20  pirifomis stretch 3 x 20 sec  TrA set x 20 with 5 sec hold x 20  Tra set with Hooklying abd/ER (U) with GTB x 10 each  TrA set with Hooklying hip abd/ER (B) GTB x 10  TrA set with Hip add isometric x 20   Bridging 2 x 10  Prone press Ups ( x 10 with 5 sec hold)     FDN performed to reduce pain and muscle tension, promote blood flow, and improve ROM and function x 5 minutes performed by Van Eugene PT.  Pt tolerated tx well without adverse effects. He was educated on what to expect following the procedure and she verbalized understanding. See Addendum by Van Eugene PT for details.    Slime received the following manual therapy techniques: Soft tissue Mobilization were applied to the: lumbar  "paraspinals/piriformis for 15 minutes including:  Long axis distraction (B)LE 5 x 20 sec ( fair relief)  Gentle lumbar distraction in hooklying   STM (L) lumbar paraspinals/piriformis area    Patient provided with a heel lift to (R) shoe ( 5/16") for leg length difference. He was advised on trial use if helpful and to remove if symptoms are aggravated which he voices understanding.     Patient receives moist heat applied to lower back for pain control/Muscle relaxation x 10 minutes.      Patient education: Patient educated to continue with previously issued HEP to tolerance which he voices understanding.   No spiritual or educational barriers to learning      Assessment     Patient tolerated randa Tx fair.  He continues to deal with acute exacerbation of chronic lower back symptoms which is moderate to severe in intensity. He reports some temporary reduction in symptoms with dry needling last session and this was again performed today without c/o. Able to perform the above ex's/activities without increased pain symptoms and reports slight decreased pain with with prone press up, bridging ex and manual techniques. He did require some verbal and tactile cueing for correct performance of TrA set ( was attempting too forcefully).  He remains guarded with transitional movements due to increased pain.  Will monitor effectiveness with trial of heel lift in (R) shoe to account for leg length difference.   This is a 37 y.o. male referred to outpatient physical therapy and presents with a medical diagnosis of   Encounter Diagnoses   Name Primary?    Lumbosacral pain, chronic     Chronic left-sided low back pain without sciatica     and demonstrates limitations as described in the problem list.  Pt will continue to benefit from skilled outpatient physical therapy to address the deficits listed in the problem list, provide pt/family education and to maximize pt's level of independence in the home and community environment. "     Goals to be met in 8 weeks:   1) Pt will be independent and report consistency in  performing HEP to maximize benefit from PT  2) Pt will reprt  lumbosacral ROM is no longer stuff and improve performance of ADLS  3) Pt will report use of home modalities for pain management.   4) Pt will report one degree less of pain  5/7 days  5) Pt will report interrupted sleep no more than twice a night.  6) Pt will report improved ability to return to normal activity  Re-assessment due on or before:  2/2/18   Plan     Continue with established Plan of Care towards PT goals.    Therapist: Gregory Rivera, PTA  1/17/2018

## 2018-01-24 ENCOUNTER — CLINICAL SUPPORT (OUTPATIENT)
Dept: REHABILITATION | Facility: HOSPITAL | Age: 38
End: 2018-01-24
Attending: PHYSICAL MEDICINE & REHABILITATION
Payer: MEDICAID

## 2018-01-24 DIAGNOSIS — G89.29 CHRONIC LEFT-SIDED LOW BACK PAIN WITHOUT SCIATICA: ICD-10-CM

## 2018-01-24 DIAGNOSIS — M54.50 CHRONIC LEFT-SIDED LOW BACK PAIN WITHOUT SCIATICA: ICD-10-CM

## 2018-01-24 DIAGNOSIS — M54.50 LUMBOSACRAL PAIN, CHRONIC: ICD-10-CM

## 2018-01-24 DIAGNOSIS — G89.29 LUMBOSACRAL PAIN, CHRONIC: ICD-10-CM

## 2018-01-24 NOTE — PROGRESS NOTES
Physical Therapy Daily Note     Name: Slime Bradshaw  Clinic Number: [id number]  Diagnosis:   No diagnosis found.  Physician: Yasmeen Narvaez MD Eval date: 1/2/18  Visit #:5    Time In: 8:00  Time Out: 9:00  Treatment time: 50    Subjective     Pt reports: chronic (L) lower back/SI/Piriformis pain. States that pain is present all of the time. Worse with prolonged walking, standing and sitting. States that pain varies from moderate to severe. He reports no significant change from the addition of heel lift to his R shoe   Pain Scale: Slime rates pain on a scale of 0-10 to be 5 currently.    Objective   Appears to have leg length difference with (R)LE shorter than (L) ~  1.5 Cm's.   L side of pelvis elevated on the L as compared to the R    Lower Extremity Strength  Right LE  Left LE    Knee extension: 3+/5 Knee extension: 3+/5   Knee flexion: 2/5 Knee flexion: 2/5   Hip flexion: 2/5 Hip flexion: 2/5   Hip extension:  3/5 Hip extension: 2/5   Hip abduction: 4/5 Hip abduction: 4/5   Hip adduction: 5/5 Hip adduction: 3/5               Ankle dorsiflexion:   5/5 Ankle dorsiflexion:   5/5   Ankle plantarflexion: NT Ankle plantarflexion: NT        Slime received individual therapeutic exercises to develop Lumbar/corestrength, endurance, ROM, flexibility, posture and core stabilization for 30 minutes including:  TM ambulation x 5 minutes @ 3.0 mph--NP  Supine hamstring stretch with belt assist 3 x 20 sec  Supine sciatic nerve glide on (L) x 20 - np  pirifomis stretch 3 x 20 sec  TrA set x 20 with 5 sec hold x 20  Tra set with Hooklying abd/ER (U) with GTB x 10 each  TrA set with Hooklying hip abd/ER (B) GTB x 10  TrA set with Hip add isometric x 20   Bridging 2 x 10  Prone press Ups ( x 10 with 5 sec hold)     FDN performed to reduce pain and muscle tension, promote blood flow, and improve ROM and function x 5 minutes performed by Van Eugene PT.  Pt tolerated tx  "well without adverse effects. He was educated on what to expect following the procedure and she verbalized understanding. See Addendum by Van Eugene PT for details.    Slime received the following manual therapy techniques: Soft tissue Mobilization were applied to the: lumbar paraspinals/piriformis for 15 minutes including:  Long axis distraction (B)LE ( fair relief)  Gentle lumbar distraction in hooklying - np  STM (L) lumbar paraspinals/piriformis area - np    Patient provided with a heel lift to (R) shoe ( 5/16") for leg length difference. He was advised on trial use if helpful and to remove if symptoms are aggravated which he voices understanding.     Patient receives moist heat applied to lower back for pain control/Muscle relaxation x 10 minutes.      Patient education: Patient educated to continue with previously issued HEP to tolerance which he voices understanding.   No spiritual or educational barriers to learning      Assessment     Patient tolerated randa Tx fair.  He continues to deal with acute exacerbation of chronic lower back symptoms which is moderate to severe in intensity. He reports some temporary reduction in symptoms with dry needling last session and this was again performed today without c/o. Able to perform the above ex's/activities without increased pain symptoms and reports slight decreased pain with with prone press up, bridging ex and manual techniques. He did require some verbal and tactile cueing for correct performance of TrA set ( was attempting too forcefully).  He remains guarded with transitional movements due to increased pain.  Will monitor effectiveness with trial of heel lift in (R) shoe to account for leg length difference.   This is a 37 y.o. male referred to outpatient physical therapy and presents with a medical diagnosis of   Encounter Diagnoses   Name Primary?    Lumbosacral pain, chronic     Chronic left-sided low back pain without sciatica     and demonstrates " limitations as described in the problem list.  Pt will continue to benefit from skilled outpatient physical therapy to address the deficits listed in the problem list, provide pt/family education and to maximize pt's level of independence in the home and community environment.     Goals to be met in 8 weeks:   1) Pt will be independent and report consistency in  performing HEP to maximize benefit from PT  2) Pt will reprt  lumbosacral ROM is no longer stuff and improve performance of ADLS  3) Pt will report use of home modalities for pain management.   4) Pt will report one degree less of pain  5/7 days  5) Pt will report interrupted sleep no more than twice a night.  6) Pt will report improved ability to return to normal activity  Re-assessment due on or before:  2/2/18   Plan     Continue with established Plan of Care towards PT goals.    Therapist: Van Eugene, PT  1/24/2018

## 2018-01-31 ENCOUNTER — CLINICAL SUPPORT (OUTPATIENT)
Dept: REHABILITATION | Facility: HOSPITAL | Age: 38
End: 2018-01-31
Attending: PHYSICAL MEDICINE & REHABILITATION
Payer: MEDICAID

## 2018-01-31 DIAGNOSIS — M54.50 LUMBOSACRAL PAIN, CHRONIC: ICD-10-CM

## 2018-01-31 DIAGNOSIS — G89.29 LUMBOSACRAL PAIN, CHRONIC: ICD-10-CM

## 2018-01-31 DIAGNOSIS — M54.50 CHRONIC LEFT-SIDED LOW BACK PAIN WITHOUT SCIATICA: ICD-10-CM

## 2018-01-31 DIAGNOSIS — G89.29 CHRONIC LEFT-SIDED LOW BACK PAIN WITHOUT SCIATICA: ICD-10-CM

## 2018-01-31 NOTE — PROGRESS NOTES
Physical Therapy Daily Note     Name: Slime Bradshaw  Clinic Number: [id number]  Diagnosis:   Encounter Diagnoses   Name Primary?    Lumbosacral pain, chronic     Chronic left-sided low back pain without sciatica      Physician: Yasmeen Narvaez MD Eval date: 1/2/18  Visit #:6    Time In: 3;30  Time Out: 4:30  Treatment time: 30    Subjective     Pt reports: chronic (L) lower back/SI/Piriformis pain. States that pain is present all of the time. Worse with prolonged walking, standing and sitting. States that pain varies from moderate to severe. He reports no significant change from the addition of heel lift to his R shoe   Pain Scale: Slime rates pain on a scale of 0-10 to be 5 currently.    Objective   Appears to have leg length difference with (R)LE shorter than (L) ~  1.5 Cm's.   L side of pelvis elevated on the L as compared to the R    Lower Extremity Strength  Right LE  Left LE    Knee extension: 3+/5 Knee extension: 3+/5   Knee flexion: 2/5 Knee flexion: 2/5   Hip flexion: 2/5 Hip flexion: 2/5   Hip extension:  3/5 Hip extension: 2/5   Hip abduction: 4/5 Hip abduction: 4/5   Hip adduction: 5/5 Hip adduction: 3/5               Ankle dorsiflexion:   5/5 Ankle dorsiflexion:   5/5   Ankle plantarflexion: NT Ankle plantarflexion: NT        Slime received individual therapeutic exercises to develop Lumbar/corestrength, endurance, ROM, flexibility, posture and core stabilization for 30 minutes including:  TM ambulation x 5 minutes @ 3.0 mph--NP  Supine hamstring stretch with belt assist 3 x 20 sec  Supine sciatic nerve glide on (L) x 20 - np  pirifomis stretch 3 x 20 sec  TrA set x 20 with 5 sec hold x 20  Tra set with Hooklying abd/ER (U) with GTB x 10 each  TrA set with Hooklying hip abd/ER (B) GTB x 10  TrA set with Hip add isometric x 20   Bridging 2 x 10  Prone press Ups ( x 10 with 5 sec hold)     FDN performed to reduce pain and muscle tension, promote  "blood flow, and improve ROM and function x 5 minutes performed by Van Eugene PT.  Pt tolerated tx well without adverse effects. He was educated on what to expect following the procedure and she verbalized understanding. See Addendum by Van Eugene PT for details.    Slime received the following manual therapy techniques: Soft tissue Mobilization were applied to the: lumbar paraspinals/piriformis for 15 minutes including:  Long axis distraction (B)LE ( fair relief)  Gentle lumbar distraction in hooklying - np  STM (L) lumbar paraspinals/piriformis area - np    Patient provided with a heel lift to (R) shoe ( 5/16") for leg length difference. He was advised on trial use if helpful and to remove if symptoms are aggravated which he voices understanding.     Patient receives moist heat applied to lower back for pain control/Muscle relaxation x 10 minutes.      Patient education: Patient educated to continue with previously issued HEP to tolerance which he voices understanding.   No spiritual or educational barriers to learning      Assessment     Patient tolerated randa Tx fair.  He continues to deal with acute exacerbation of chronic lower back symptoms which is moderate to severe in intensity. He reports some temporary reduction in symptoms with dry needling last session and this was again performed today without c/o. Able to perform the above ex's/activities without increased pain symptoms and reports slight decreased pain with with prone press up, bridging ex and manual techniques. He did require some verbal and tactile cueing for correct performance of TrA set ( was attempting too forcefully).  He remains guarded with transitional movements due to increased pain.  Will monitor effectiveness with trial of heel lift in (R) shoe to account for leg length difference.   This is a 37 y.o. male referred to outpatient physical therapy and presents with a medical diagnosis of   Encounter Diagnoses   Name Primary? "    Lumbosacral pain, chronic     Chronic left-sided low back pain without sciatica     and demonstrates limitations as described in the problem list.  Pt will continue to benefit from skilled outpatient physical therapy to address the deficits listed in the problem list, provide pt/family education and to maximize pt's level of independence in the home and community environment.     Goals to be met in 8 weeks:   1) Pt will be independent and report consistency in  performing HEP to maximize benefit from PT  2) Pt will reprt  lumbosacral ROM is no longer stuff and improve performance of ADLS  3) Pt will report use of home modalities for pain management.   4) Pt will report one degree less of pain  5/7 days  5) Pt will report interrupted sleep no more than twice a night.  6) Pt will report improved ability to return to normal activity  Re-assessment due on or before:  2/2/18   Plan     Continue with established Plan of Care towards PT goals.    Therapist: Vna Eugene, PT  1/31/2018

## 2018-02-07 ENCOUNTER — HOSPITAL ENCOUNTER (OUTPATIENT)
Dept: RADIOLOGY | Facility: HOSPITAL | Age: 38
Discharge: HOME | End: 2018-02-07
Attending: PHYSICAL MEDICINE & REHABILITATION
Payer: MEDICAID

## 2018-02-07 ENCOUNTER — OFFICE VISIT (OUTPATIENT)
Dept: PHYSICAL MEDICINE AND REHAB | Facility: CLINIC | Age: 38
End: 2018-02-07
Payer: MEDICAID

## 2018-02-07 VITALS
BODY MASS INDEX: 28.84 KG/M2 | DIASTOLIC BLOOD PRESSURE: 80 MMHG | HEART RATE: 54 BPM | HEIGHT: 74 IN | SYSTOLIC BLOOD PRESSURE: 121 MMHG | WEIGHT: 224.75 LBS

## 2018-02-07 DIAGNOSIS — M54.50 LUMBOSACRAL PAIN, CHRONIC: ICD-10-CM

## 2018-02-07 DIAGNOSIS — G89.29 CHRONIC LEFT-SIDED LOW BACK PAIN WITHOUT SCIATICA: Primary | ICD-10-CM

## 2018-02-07 DIAGNOSIS — G89.29 CHRONIC LEFT-SIDED LOW BACK PAIN WITHOUT SCIATICA: ICD-10-CM

## 2018-02-07 DIAGNOSIS — M53.3 SACROILIAC JOINT DYSFUNCTION OF LEFT SIDE: ICD-10-CM

## 2018-02-07 DIAGNOSIS — M54.50 CHRONIC LEFT-SIDED LOW BACK PAIN WITHOUT SCIATICA: ICD-10-CM

## 2018-02-07 DIAGNOSIS — M54.50 CHRONIC LEFT-SIDED LOW BACK PAIN WITHOUT SCIATICA: Primary | ICD-10-CM

## 2018-02-07 DIAGNOSIS — G89.29 LUMBOSACRAL PAIN, CHRONIC: ICD-10-CM

## 2018-02-07 PROCEDURE — 99999 PR PBB SHADOW E&M-EST. PATIENT-LVL III: CPT | Mod: PBBFAC,,, | Performed by: PHYSICAL MEDICINE & REHABILITATION

## 2018-02-07 PROCEDURE — 99213 OFFICE O/P EST LOW 20 MIN: CPT | Mod: PBBFAC | Performed by: PHYSICAL MEDICINE & REHABILITATION

## 2018-02-07 PROCEDURE — 99214 OFFICE O/P EST MOD 30 MIN: CPT | Mod: S$PBB,,, | Performed by: PHYSICAL MEDICINE & REHABILITATION

## 2018-02-07 PROCEDURE — 3008F BODY MASS INDEX DOCD: CPT | Mod: ,,, | Performed by: PHYSICAL MEDICINE & REHABILITATION

## 2018-02-07 RX ORDER — TRAMADOL HYDROCHLORIDE 50 MG/1
50 TABLET ORAL EVERY 8 HOURS PRN
Qty: 90 TABLET | Refills: 0 | Status: SHIPPED | OUTPATIENT
Start: 2018-02-07 | End: 2018-03-07 | Stop reason: SDUPTHER

## 2018-02-07 NOTE — PROGRESS NOTES
Subjective:       Patient ID: Slime Bradshaw is a 37 y.o. male.    Chief Complaint: Back Pain    Back Pain   Pertinent negatives include no chest pain, dysuria, headaches, numbness or weakness.      Slime Ledezma is 36 y/o male who returns  to clinic for   Worsening of chronic back pain.   LCV 12/29/17.   ON LCV , he was referred to Physical therapy, given NSAIDs, and Tramadol, but he returns to clinic with statement that pain is the same.  He reports no improvement since LCV.  Tramadol helps a little bit, but he would like to know what it is.    Back Pain Description:  Length: pain is chronic pain. Length >  15  Year(s),.   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   He states that he at that time reported for the same pain.   He was later in college basket bal;l, with multiple falls, he is now doing  gym, and weight , can bench press 250 lbs, lying down, can do free style ~ 100 lbs.   Multiple  past, recent injury, falls.  Intensity:  CURRENT   6/10,  AVERAGE  Pain   3/10. at BEST   1-2/10 , At WORST   8/10 on the WORST day.   Location: pain is localized at Rt side of back.   It is only  Back pain and no leg pain.  Radiation: Positive to buttocks. Negative to legs.   Timing : constant , + morning pain, w/o stiffness ,as long as I walk I have no problem,   If I walk too long or sit too long I have a problem.   QUALITY:  Dull pain with sharp pain with  m movement,   NO neuropathic : burning, tingling, numbness  Negative leg weakness.   Worsening factors: prolong sitting,. Being in same position, leaning toward left side and sitting feel better   Alleviating factors: lying down has a pain  Symptoms interfere with daily activity, sleeping and work.   Current medications: Steroids, Robaxin. Sometimes in past Zanaflex help, Flexeril   Makes him too sleepy.;   Failed medications:  Ibuprofen 800 mg,.  Prior procedures. Torn ACL in Rt knee while playing basket ball.   PT/OT:  none  Patient denies night fever/night sweats, bowel incontinence, significant weight loss and significant motor weakness ( red flags).  Patient denies any suicidal or homicidal ideations.   He is here for evaluation and treatment.    No past medical history on file.    Past Surgical History:   Procedure Laterality Date    KNEE ARTHROSCOPY W/ ACL RECONSTRUCTION      right    WISDOM TOOTH EXTRACTION         Family History   Problem Relation Age of Onset    Cancer Father      throat-- smoker    Hypertension Mother     Diabetes Mother     Hypertension Brother        Social History     Social History    Marital status: Single     Spouse name: N/A    Number of children: N/A    Years of education: N/A     Social History Main Topics    Smoking status: Never Smoker    Smokeless tobacco: None    Alcohol use 0.0 - 1.0 oz/week     0 - 2 drink(s) per week    Drug use: No    Sexual activity: Yes     Partners: Female     Other Topics Concern    None     Social History Narrative    Works as a  for [employer]       Current Outpatient Prescriptions   Medication Sig Dispense Refill    buPROPion (WELLBUTRIN) 75 MG tablet Take 1 tablet (75 mg total) by mouth 2 (two) times daily. (Patient taking differently: Take 75 mg by mouth 2 (two) times daily. ) 60 tablet 5    gabapentin (NEURONTIN) 100 MG capsule Take one cap in am , pm, and 3 cap at bedtime 150 capsule 2    traMADol (ULTRAM) 50 mg tablet Take 1 tablet (50 mg total) by mouth every 8 (eight) hours as needed for Pain. 90 tablet 0    zolpidem (AMBIEN) 5 MG Tab Take 1 tablet (5 mg total) by mouth nightly as needed. 30 tablet 3     No current facility-administered medications for this visit.        Review of patient's allergies indicates:   Allergen Reactions    Hydrocodone Itching     Review of Systems   Constitutional: Negative for appetite change and fatigue.   Eyes: Negative for visual disturbance.   Respiratory: Negative for shortness of breath.     Cardiovascular: Negative for chest pain.   Gastrointestinal: Negative for constipation and diarrhea.   Genitourinary: Negative for dysuria, frequency and urgency.   Musculoskeletal: Positive for back pain. Negative for arthralgias, gait problem, joint swelling, myalgias, neck pain and neck stiffness.   Neurological: Negative for dizziness, tremors, weakness, numbness and headaches.   Psychiatric/Behavioral: Negative for dysphoric mood.   All other systems reviewed and are negative.        Objective:      Physical Exam    GENERAL: The patient is alert, oriented, pleasant.  HEENT: PERRLA  NECK: supple, no masses,    CV: S1S2, RRR, no murmurs, rales.  LUNGS: CTA bilateral.   ABDOMEN: soft, non-tender, non distended, bowel sounds nl.  EXTREMITIES: no edema, +2 pulses DP, b/l  SKIN: no skin rash, no skin breakdowns.  MUSCULOSKELETAL:   Gait : normal, walks with no AD.  Walks on toes/heels w/o difficulties.  Cervical spine: full AROM in cervical spine   Lumbar spine, decreased active range of motion in all planes, flexion to 90 degrees, with pain on extention, that is limited to 0.  Side bending and rotation to 35-40 degrees, b/l, with pain on left side bellow the waist line.  Positive facet loading on left.   Straight leg raising Negative bilaterally.   Minimal movement of Left SI joint compare to Right.  Full range of motion in all joints x4 extremities.   Muscle strength 5/5 throughout x4 extremities.   No  joint laxity throughout x4 extremities.   NEUROLOGIC: Cranial nerves II through XII intact.   Deep tendon reflexes is normal, +2 in the upper and lower extremities bilaterally.   Muscle tone is normal.   Sensory is intact to light touch and pinprick throughout x4 extremities.       Assessment:       1. Chronic left-sided low back pain without sciatica    2. Lumbosacral pain, chronic    3. Sacroiliac joint dysfunction of left side        Plan:       Chronic left-sided low back pain without sciatica  -     X-Ray  Lumbar Complete With Flex And Ext; Future  -     MRI Lumbar Spine Without Contrast; Future  -     traMADol (ULTRAM) 50 mg tablet; Take 1 tablet (50 mg total) by mouth every 8 (eight) hours as needed for Pain.  Dispense: 90 tablet; Refill: 0    Lumbosacral pain, chronic  -     X-Ray Lumbar Complete With Flex And Ext; Future  -     MRI Lumbar Spine Without Contrast; Future  -     traMADol (ULTRAM) 50 mg tablet; Take 1 tablet (50 mg total) by mouth every 8 (eight) hours as needed for Pain.  Dispense: 90 tablet; Refill: 0    Sacroiliac joint dysfunction of left side  -     traMADol (ULTRAM) 50 mg tablet; Take 1 tablet (50 mg total) by mouth every 8 (eight) hours as needed for Pain.  Dispense: 90 tablet; Refill: 0      RTC in 6 weeks.     Total time spent face to face with patient was 25 minutes.   More than 50% of that time was spent in counseling on diagnosis , prognosis and treatment options.   I also  patient  on common and most usual side effect of prescribed medications.    I reviewed Primary care , and other specialty's notes to better coordinate patient's  care.   All questions were answered, and patient voiced understanding.

## 2018-02-08 ENCOUNTER — HOSPITAL ENCOUNTER (OUTPATIENT)
Dept: RADIOLOGY | Facility: HOSPITAL | Age: 38
Discharge: HOME | End: 2018-02-08
Attending: PHYSICAL MEDICINE & REHABILITATION
Payer: MEDICAID

## 2018-02-08 ENCOUNTER — CLINICAL SUPPORT (OUTPATIENT)
Dept: REHABILITATION | Facility: HOSPITAL | Age: 38
End: 2018-02-08
Attending: PHYSICAL MEDICINE & REHABILITATION
Payer: MEDICAID

## 2018-02-08 DIAGNOSIS — G89.29 CHRONIC LEFT-SIDED LOW BACK PAIN WITHOUT SCIATICA: ICD-10-CM

## 2018-02-08 DIAGNOSIS — M54.50 LUMBOSACRAL PAIN, CHRONIC: ICD-10-CM

## 2018-02-08 DIAGNOSIS — M54.50 CHRONIC LEFT-SIDED LOW BACK PAIN WITHOUT SCIATICA: ICD-10-CM

## 2018-02-08 DIAGNOSIS — G89.29 LUMBOSACRAL PAIN, CHRONIC: ICD-10-CM

## 2018-02-08 PROCEDURE — 72114 X-RAY EXAM L-S SPINE BENDING: CPT | Mod: 26,,, | Performed by: RADIOLOGY

## 2018-02-08 PROCEDURE — 97110 THERAPEUTIC EXERCISES: CPT | Mod: PO

## 2018-02-08 PROCEDURE — 72114 X-RAY EXAM L-S SPINE BENDING: CPT | Mod: TC,PO

## 2018-02-08 NOTE — PROGRESS NOTES
Physical Therapy Daily Note     Name: Slime Bradshaw  Clinic Number: [id number]  Diagnosis:   Encounter Diagnoses   Name Primary?    Lumbosacral pain, chronic     Chronic left-sided low back pain without sciatica      Physician: Yasmeen Narvaez MD Eval date: 1/2/18  Visit #:7   Time In: 8:30  Time Out: 8:55  Treatment time: 50    Subjective     Pt reports: Pt reported that he is not too sore today.  He stated that he thought that his appointment was later that is why he is late.   Pain Scale: Slime rates pain on a scale of 0-10 to be 5 currently.    Objective   Pt with a leg length difference of 1.5 cm L longer than the R.  L side of pelvis elevated on the L as compared to the R    Lower Extremity Strength - not reassesed  Right LE  Left LE    Knee extension: 3+/5 Knee extension: 3+/5   Knee flexion: 2/5 Knee flexion: 2/5   Hip flexion: 2/5 Hip flexion: 2/5   Hip extension:  3/5 Hip extension: 2/5   Hip abduction: 4/5 Hip abduction: 4/5   Hip adduction: 5/5 Hip adduction: 3/5               Ankle dorsiflexion:   5/5 Ankle dorsiflexion:   5/5   Ankle plantarflexion: NT Ankle plantarflexion: NT        Slime received individual therapeutic exercises to develop Lumbar/corestrength, endurance, ROM, flexibility, posture and core stabilization for 30 minutes including:  Pt received Manual therapy x 25 min  Pt received Dry Needling:  L gluteal   L Cuneals  B L 5  B L4        Tx not performed today:  TM ambulation x 5 minutes @ 3.0 mph--NP  Supine hamstring stretch with belt assist 3 x 20 sec  Supine sciatic nerve glide on (L) x 20 - np  pirifomis stretch 3 x 20 sec  TrA set x 20 with 5 sec hold x 20  Tra set with Hooklying abd/ER (U) with GTB x 10 each  TrA set with Hooklying hip abd/ER (B) GTB x 10  TrA set with Hip add isometric x 20   Bridging 2 x 10  Prone press Ups ( x 10 with 5 sec hold)   Slime received the following manual therapy techniques: Soft tissue  Mobilization were applied to the: lumbar paraspinals/piriformis for 15 minutes including:  Long axis distraction (B)LE ( fair relief)  Gentle lumbar distraction in hooklying - np  STM (L) lumbar paraspinals/piriformis area - np         Patient education: Discussed with Pt that he may benefit from a built up shoe to address his leg length discrepency  No spiritual or educational barriers to learning      Assessment     Patient tolerated randa Tx well.  He reported some discomfort with dry needling of his L Cuneals.  Pt voiced understanding of the purpose for the R shoe adjustment.  I have contacted Dr. Narvaez to request a referral to an Orthotist to address Mr. Palencia's leg length difference.   This is a 37 y.o. male referred to outpatient physical therapy and presents with a medical diagnosis of   Encounter Diagnoses   Name Primary?    Lumbosacral pain, chronic     Chronic left-sided low back pain without sciatica     and demonstrates limitations as described in the problem list.  Pt will continue to benefit from skilled outpatient physical therapy to address the deficits listed in the problem list, provide pt/family education and to maximize pt's level of independence in the home and community environment.     Goals to be met in 8 weeks:   1) Pt will be independent and report consistency in  performing HEP to maximize benefit from PT  2) Pt will reprt  lumbosacral ROM is no longer stuff and improve performance of ADLS  3) Pt will report use of home modalities for pain management.   4) Pt will report one degree less of pain  5/7 days  5) Pt will report interrupted sleep no more than twice a night.  6) Pt will report improved ability to return to normal activity  Re-assessment due on or before:  2/2/18   Plan     Continue with established Plan of Care towards PT goals.    Therapist: Van Eugene, PT  2/8/2018

## 2018-02-16 ENCOUNTER — HOSPITAL ENCOUNTER (OUTPATIENT)
Dept: RADIOLOGY | Facility: HOSPITAL | Age: 38
Discharge: HOME | End: 2018-02-16
Attending: PHYSICAL MEDICINE & REHABILITATION
Payer: MEDICAID

## 2018-02-16 DIAGNOSIS — G89.29 LUMBOSACRAL PAIN, CHRONIC: ICD-10-CM

## 2018-02-16 DIAGNOSIS — M54.50 CHRONIC LEFT-SIDED LOW BACK PAIN WITHOUT SCIATICA: ICD-10-CM

## 2018-02-16 DIAGNOSIS — M54.50 LUMBOSACRAL PAIN, CHRONIC: ICD-10-CM

## 2018-02-16 DIAGNOSIS — G89.29 CHRONIC LEFT-SIDED LOW BACK PAIN WITHOUT SCIATICA: ICD-10-CM

## 2018-02-16 PROCEDURE — 72148 MRI LUMBAR SPINE W/O DYE: CPT | Mod: 26,,, | Performed by: RADIOLOGY

## 2018-02-16 PROCEDURE — 72148 MRI LUMBAR SPINE W/O DYE: CPT | Mod: TC

## 2018-02-21 ENCOUNTER — CLINICAL SUPPORT (OUTPATIENT)
Dept: REHABILITATION | Facility: HOSPITAL | Age: 38
End: 2018-02-21
Attending: PHYSICAL MEDICINE & REHABILITATION
Payer: MEDICAID

## 2018-02-21 DIAGNOSIS — G89.29 LUMBOSACRAL PAIN, CHRONIC: ICD-10-CM

## 2018-02-21 DIAGNOSIS — M54.50 CHRONIC LEFT-SIDED LOW BACK PAIN WITHOUT SCIATICA: ICD-10-CM

## 2018-02-21 DIAGNOSIS — M54.50 LUMBOSACRAL PAIN, CHRONIC: ICD-10-CM

## 2018-02-21 DIAGNOSIS — G89.29 CHRONIC LEFT-SIDED LOW BACK PAIN WITHOUT SCIATICA: ICD-10-CM

## 2018-02-21 PROCEDURE — 97110 THERAPEUTIC EXERCISES: CPT | Mod: PO

## 2018-02-21 PROCEDURE — 97140 MANUAL THERAPY 1/> REGIONS: CPT | Mod: PO

## 2018-02-21 NOTE — PROGRESS NOTES
Physical Therapy Daily Note     Name: Slime Bradshaw  Clinic Number: [id number]  Diagnosis:   Encounter Diagnosis   Name Primary?    'light-for-dates' infant with signs of fetal malnutrition      Physician: Yasmeen Narvaez MD Eval date: 1/2/18  Visit #:7   Time In: 12:30  Time Out: 1:15  Treatment time: 45    Subjective     Pt reports: Pt reported that he has been trying to work through the pain.  He also stated that he saw Dr. Narvaez and has had an x-ray and MRI of his lumbar spine   Pain Scale: Slime rates pain on a scale of 0-10 to be 5 currently.    Objective   Pt with a leg length difference of 1.5 cm L longer than the R.  L side of pelvis elevated on the L as compared to the R    Lower Extremity Strength - not reassesed  Right LE  Left LE    Knee extension: 3+/5 Knee extension: 3+/5   Knee flexion: 2/5 Knee flexion: 2/5   Hip flexion: 2/5 Hip flexion: 2/5   Hip extension:  3/5 Hip extension: 2/5   Hip abduction: 4/5 Hip abduction: 4/5   Hip adduction: 5/5 Hip adduction: 3/5               Ankle dorsiflexion:   5/5 Ankle dorsiflexion:   5/5   Ankle plantarflexion: NT Ankle plantarflexion: NT        Slime received individual therapeutic exercises to develop Lumbar/corestrength, endurance, ROM, flexibility, posture and core stabilization for 30 minutes including:  Pt received Manual therapy x 25 min  Pt received Dry Needling:  L gluteal   L Cuneals  B L 5  B L4    Therapeutic exercise x 15 min,  Gluteal sets - submaximal 10 x 2  Supine hip add with a volley ball 10 x 3  Supine B hip abd with green TB 10 x 3  Piriformis stretch 3 x 30 sec on L & R        Tx not performed today:  TM ambulation x 5 minutes @ 3.0 mph--NP  Supine hamstring stretch with belt assist 3 x 20 sec  Supine sciatic nerve glide on (L) x 20 - np  pirifomis stretch 3 x 20 sec  TrA set x 20 with 5 sec hold x 20  Tra set with Hooklying abd/ER (U) with GTB x 10 each  TrA set with Hooklying hip  abd/ER (B) GTB x 10  TrA set with Hip add isometric x 20   Bridging 2 x 10  Prone press Ups ( x 10 with 5 sec hold)   Slime received the following manual therapy techniques: Soft tissue Mobilization were applied to the: lumbar paraspinals/piriformis for 15 minutes including:  Long axis distraction (B)LE ( fair relief)  Gentle lumbar distraction in hooklying - np  STM (L) lumbar paraspinals/piriformis area - np         Patient education: Discussed with Pt that he may benefit from a built up shoe to address his leg length discrepency  No spiritual or educational barriers to learning      Assessment     Patient tolerated randa Tx well.  He reported continued pain in his L gluteal region.  He also continues to have a leg length difference with the L longer than the R which I believe is the cause of his lower back and L gluteal pain.  This is a 37 y.o. male referred to outpatient physical therapy and presents with a medical diagnosis of   Encounter Diagnoses   Name Primary?    Lumbosacral pain, chronic     Chronic left-sided low back pain without sciatica     and demonstrates limitations as described in the problem list.  Pt will continue to benefit from skilled outpatient physical therapy to address the deficits listed in the problem list, provide pt/family education and to maximize pt's level of independence in the home and community environment.     Goals to be met in 8 weeks:   1) Pt will be independent and report consistency in  performing HEP to maximize benefit from PT  2) Pt will reprt  lumbosacral ROM is no longer stuff and improve performance of ADLS  3) Pt will report use of home modalities for pain management.   4) Pt will report one degree less of pain  5/7 days  5) Pt will report interrupted sleep no more than twice a night.  6) Pt will report improved ability to return to normal activity  Re-assessment due on or before:  2/2/18   Plan     Continue with established Plan of Care towards PT  goals.    Therapist: Van Eugene, PT  2/21/2018

## 2018-02-28 ENCOUNTER — CLINICAL SUPPORT (OUTPATIENT)
Dept: REHABILITATION | Facility: HOSPITAL | Age: 38
End: 2018-02-28
Attending: PHYSICAL MEDICINE & REHABILITATION
Payer: MEDICAID

## 2018-02-28 DIAGNOSIS — M54.50 CHRONIC LEFT-SIDED LOW BACK PAIN WITHOUT SCIATICA: ICD-10-CM

## 2018-02-28 DIAGNOSIS — M54.50 LUMBOSACRAL PAIN, CHRONIC: ICD-10-CM

## 2018-02-28 DIAGNOSIS — G89.29 LUMBOSACRAL PAIN, CHRONIC: ICD-10-CM

## 2018-02-28 DIAGNOSIS — G89.29 CHRONIC LEFT-SIDED LOW BACK PAIN WITHOUT SCIATICA: ICD-10-CM

## 2018-02-28 PROCEDURE — 97140 MANUAL THERAPY 1/> REGIONS: CPT | Mod: PO

## 2018-02-28 PROCEDURE — 97110 THERAPEUTIC EXERCISES: CPT | Mod: PO

## 2018-02-28 NOTE — PROGRESS NOTES
Physical Therapy Daily Note     Name: Slime Bradshaw  Clinic Number: [id number]  Diagnosis:   Encounter Diagnoses   Name Primary?    Lumbosacral pain, chronic     Chronic left-sided low back pain without sciatica      Physician: Yasmeen Narvaez MD Eval date: 1/2/18  Visit #:8   Time In: 9:00  Time Out: 9:30  Treatment time: 30    Subjective     Pt reports: Pt reported that he has been stiff and sore lately.  He also stated taht he would not like to have dry needling performed today  Pain Scale: Slime rates pain on a scale of 0-10 to be 5 currently.    Objective   Pt with a leg length difference of 1.5 cm L longer than the R.  L side of pelvis elevated on the L as compared to the R    Lower Extremity Strength - not reassesed  Right LE  Left LE    Knee extension: 3+/5 Knee extension: 3+/5   Knee flexion: 2/5 Knee flexion: 2/5   Hip flexion: 2/5 Hip flexion: 2/5   Hip extension:  3/5 Hip extension: 2/5   Hip abduction: 4/5 Hip abduction: 4/5   Hip adduction: 5/5 Hip adduction: 3/5               Ankle dorsiflexion:   5/5 Ankle dorsiflexion:   5/5   Ankle plantarflexion: NT Ankle plantarflexion: NT        Slime received individual therapeutic exercises to develop Lumbar/corestrength, endurance, ROM, flexibility, posture and core stabilization for 30 minutes including:  Pt received Manual therapy x 10 min   Soft tissue mobilization L lumbar paraspinals    Therapeutic exercise x 15 min,  Gluteal sets - submaximal 10 x 2  Supine hip add with a volley ball 10 x 3  Supine B hip abd with green TB 10 x 3  Piriformis stretch 3 x 30 sec on L & R    Pt was given a 1 cm beveled heel lift on the R      Tx not performed today:  TM ambulation x 5 minutes @ 3.0 mph--NP  Supine hamstring stretch with belt assist 3 x 20 sec  Supine sciatic nerve glide on (L) x 20 - np  pirifomis stretch 3 x 20 sec  TrA set x 20 with 5 sec hold x 20  Tra set with Hooklying abd/ER (U) with GTB x 10  each  TrA set with Hooklying hip abd/ER (B) GTB x 10  TrA set with Hip add isometric x 20   Bridging 2 x 10  Prone press Ups ( x 10 with 5 sec hold)   Slime received the following manual therapy techniques: Soft tissue Mobilization were applied to the: lumbar paraspinals/piriformis for 15 minutes including:  Long axis distraction (B)LE ( fair relief)  Gentle lumbar distraction in hooklying - np  STM (L) lumbar paraspinals/piriformis area - np   Pt received Dry Needling:  L gluteal   L Cuneals  B L 5  B L4        Patient education: Discussed with Pt that he may benefit from a built up shoe to address his leg length discrepency  No spiritual or educational barriers to learning      Assessment     Patient tolerated randa Tx fairly well.  He continues to have limited ability to maintain sustained postures for prolonged periods of time. He reported continued pain in his L gluteal and lower lumbar paraspinals  He also continues to have a leg length difference with the L longer than the R which I believe is the cause of his lower back and L gluteal pain.  This is a 37 y.o. male referred to outpatient physical therapy and presents with a medical diagnosis of   Encounter Diagnoses   Name Primary?    Lumbosacral pain, chronic     Chronic left-sided low back pain without sciatica     and demonstrates limitations as described in the problem list.  Pt will continue to benefit from skilled outpatient physical therapy to address the deficits listed in the problem list, provide pt/family education and to maximize pt's level of independence in the home and community environment.     Goals to be met in 8 weeks:   1) Pt will be independent and report consistency in  performing HEP to maximize benefit from PT  2) Pt will reprt  lumbosacral ROM is no longer stuff and improve performance of ADLS  3) Pt will report use of home modalities for pain management.   4) Pt will report one degree less of pain  5/7 days  5) Pt will report  interrupted sleep no more than twice a night.  6) Pt will report improved ability to return to normal activity  Re-assessment due on or before:  2/2/18   Plan     Continue with established Plan of Care towards PT goals.    Therapist: Van Eugene, PT  2/28/2018

## 2018-03-07 ENCOUNTER — OFFICE VISIT (OUTPATIENT)
Dept: PHYSICAL MEDICINE AND REHAB | Facility: CLINIC | Age: 38
End: 2018-03-07
Payer: MEDICAID

## 2018-03-07 VITALS
BODY MASS INDEX: 29.09 KG/M2 | SYSTOLIC BLOOD PRESSURE: 131 MMHG | WEIGHT: 226.63 LBS | HEIGHT: 74 IN | DIASTOLIC BLOOD PRESSURE: 81 MMHG | HEART RATE: 63 BPM

## 2018-03-07 DIAGNOSIS — M54.50 LUMBOSACRAL PAIN, CHRONIC: ICD-10-CM

## 2018-03-07 DIAGNOSIS — M53.3 SI (SACROILIAC) JOINT DYSFUNCTION: Primary | ICD-10-CM

## 2018-03-07 DIAGNOSIS — G89.29 CHRONIC LEFT-SIDED LOW BACK PAIN WITHOUT SCIATICA: ICD-10-CM

## 2018-03-07 DIAGNOSIS — M53.3 SACROILIAC JOINT DYSFUNCTION OF LEFT SIDE: ICD-10-CM

## 2018-03-07 DIAGNOSIS — M54.50 CHRONIC LEFT-SIDED LOW BACK PAIN WITHOUT SCIATICA: ICD-10-CM

## 2018-03-07 DIAGNOSIS — M47.817 FACET ARTHRITIS OF LUMBOSACRAL REGION: ICD-10-CM

## 2018-03-07 DIAGNOSIS — G89.29 LUMBOSACRAL PAIN, CHRONIC: ICD-10-CM

## 2018-03-07 PROCEDURE — 99999 PR PBB SHADOW E&M-EST. PATIENT-LVL III: CPT | Mod: PBBFAC,,, | Performed by: PHYSICAL MEDICINE & REHABILITATION

## 2018-03-07 PROCEDURE — 99213 OFFICE O/P EST LOW 20 MIN: CPT | Mod: PBBFAC | Performed by: PHYSICAL MEDICINE & REHABILITATION

## 2018-03-07 PROCEDURE — 99214 OFFICE O/P EST MOD 30 MIN: CPT | Mod: S$PBB,,, | Performed by: PHYSICAL MEDICINE & REHABILITATION

## 2018-03-07 RX ORDER — TRAMADOL HYDROCHLORIDE 50 MG/1
50 TABLET ORAL EVERY 8 HOURS PRN
Qty: 90 TABLET | Refills: 1 | Status: SHIPPED | OUTPATIENT
Start: 2018-03-07 | End: 2018-05-07 | Stop reason: SDUPTHER

## 2018-03-07 NOTE — PROGRESS NOTES
Subjective:       Patient ID: Slime Bradshaw is a 37 y.o. male.    Chief Complaint: Back Pain    Back Pain   Pertinent negatives include no chest pain, dysuria, headaches, numbness or weakness.      Slime Ledezma is 38 y/o male who returns  to clinic for   Worsening of chronic back pain.   LCV 02/7/18.   ON LCV , he was referred to Physical therapy, given NSAIDs, and Tramadol, but he returns to clinic with statement that pain is the same.  He reports no improvement since LCV.  Tramadol helps a little bit, but he would like to know what it is.    Back Pain Description:  Length: pain is chronic pain. Length >  15  Year(s),.   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   He states that he at that time reported for the same pain.   He was later in college basket bal;l, with multiple falls, he is now doing  gym, and weight , can bench press 250 lbs, lying down, can do free style ~ 100 lbs.   Multiple  past, recent injury, falls.  Intensity:  CURRENT   6/10,  AVERAGE  Pain   3/10. at BEST   1-2/10 , At WORST   8/10 on the WORST day.   Location: pain is localized at Rt side of back.   It is only  Back pain and no leg pain.  Radiation: Positive to buttocks. Negative to legs.   Timing : constant , + morning pain, w/o stiffness ,as long as I walk I have no problem,   If I walk too long or sit too long I have a problem.   QUALITY:  Dull pain with sharp pain with  m movement,   NO neuropathic : burning, tingling, numbness  Negative leg weakness.   Worsening factors: prolong sitting,. Being in same position, leaning toward left side and sitting feel better   Alleviating factors: lying down has a pain  Symptoms interfere with daily activity, sleeping and work.   Current medications: Steroids, Robaxin. Sometimes in past Zanaflex help, Flexeril   Makes him too sleepy.;   Failed medications:  Ibuprofen 800 mg,.  Prior procedures. Torn ACL in Rt knee while playing basket ball.   PT/OT:  none  Patient denies night fever/night sweats, bowel incontinence, significant weight loss and significant motor weakness ( red flags).  Patient denies any suicidal or homicidal ideations.   He is here for evaluation and treatment.    No past medical history on file.    Past Surgical History:   Procedure Laterality Date    KNEE ARTHROSCOPY W/ ACL RECONSTRUCTION      right    WISDOM TOOTH EXTRACTION         Family History   Problem Relation Age of Onset    Cancer Father      throat-- smoker    Hypertension Mother     Diabetes Mother     Hypertension Brother        Social History     Social History    Marital status: Single     Spouse name: N/A    Number of children: N/A    Years of education: N/A     Social History Main Topics    Smoking status: Never Smoker    Smokeless tobacco: None    Alcohol use 0.0 - 1.0 oz/week     0 - 2 drink(s) per week    Drug use: No    Sexual activity: Yes     Partners: Female     Other Topics Concern    None     Social History Narrative    Works as a  for [employer]       Current Outpatient Prescriptions   Medication Sig Dispense Refill    buPROPion (WELLBUTRIN) 75 MG tablet Take 1 tablet (75 mg total) by mouth 2 (two) times daily. (Patient taking differently: Take 75 mg by mouth 2 (two) times daily. ) 60 tablet 5    gabapentin (NEURONTIN) 100 MG capsule Take one cap in am , pm, and 3 cap at bedtime 150 capsule 2    traMADol (ULTRAM) 50 mg tablet Take 1 tablet (50 mg total) by mouth every 8 (eight) hours as needed for Pain. 90 tablet 1    zolpidem (AMBIEN) 5 MG Tab Take 1 tablet (5 mg total) by mouth nightly as needed. 30 tablet 3     No current facility-administered medications for this visit.        Review of patient's allergies indicates:   Allergen Reactions    Hydrocodone Itching     Review of Systems   Constitutional: Negative for appetite change and fatigue.   Eyes: Negative for visual disturbance.   Respiratory: Negative for shortness of breath.     Cardiovascular: Negative for chest pain.   Gastrointestinal: Negative for constipation and diarrhea.   Genitourinary: Negative for dysuria, frequency and urgency.   Musculoskeletal: Positive for back pain. Negative for arthralgias, gait problem, joint swelling, myalgias, neck pain and neck stiffness.   Neurological: Negative for dizziness, tremors, weakness, numbness and headaches.   Psychiatric/Behavioral: Negative for dysphoric mood.   All other systems reviewed and are negative.        Objective:      Physical Exam    GENERAL: The patient is alert, oriented, pleasant.  HEENT: PERRLA  NECK: supple, no masses,    CV: S1S2, RRR, no murmurs, rales.  LUNGS: CTA bilateral.   ABDOMEN: soft, non-tender, non distended, bowel sounds nl.  EXTREMITIES: no edema, +2 pulses DP, b/l  SKIN: no skin rash, no skin breakdowns.  MUSCULOSKELETAL:   Gait : normal, walks with no AD.  Walks on toes/heels w/o difficulties.  Cervical spine: full AROM in cervical spine   Lumbar spine, decreased active range of motion in all planes, flexion to 90 degrees, with pain on extention, that is limited to 0.  Side bending and rotation to 35-40 degrees, b/l, with pain on left side bellow the waist line.  Positive facet loading on left.   Positive Tomas sign on Lt.  Straight leg raising Negative bilaterally.   Minimal movement of Left SI joint compare to Right.  Full range of motion in all joints x4 extremities.   Muscle strength 5/5 throughout x4 extremities.   No  joint laxity throughout x4 extremities.   NEUROLOGIC: Cranial nerves II through XII intact.   Deep tendon reflexes is normal, +2 in the upper and lower extremities bilaterally.   Muscle tone is normal.   Sensory is intact to light touch and pinprick throughout x4 extremities.       Assessment:       1. SI (sacroiliac) joint dysfunction    2. Lumbosacral pain, chronic    3. Chronic left-sided low back pain without sciatica    4. Facet arthritis of lumbosacral region    5. Sacroiliac  joint dysfunction of left side        Plan:       SI (sacroiliac) joint dysfunction  -     Procedure Order to [facility]; Future  -     traMADol (ULTRAM) 50 mg tablet; Take 1 tablet (50 mg total) by mouth every 8 (eight) hours as needed for Pain.  Dispense: 90 tablet; Refill: 1    Lumbosacral pain, chronic  -     Procedure Order to [facility]; Future  -     traMADol (ULTRAM) 50 mg tablet; Take 1 tablet (50 mg total) by mouth every 8 (eight) hours as needed for Pain.  Dispense: 90 tablet; Refill: 1    Chronic left-sided low back pain without sciatica  -     Procedure Order to [facility]; Future  -     traMADol (ULTRAM) 50 mg tablet; Take 1 tablet (50 mg total) by mouth every 8 (eight) hours as needed for Pain.  Dispense: 90 tablet; Refill: 1    Facet arthritis of lumbosacral region  -     Procedure Order to [facility]; Future  -     traMADol (ULTRAM) 50 mg tablet; Take 1 tablet (50 mg total) by mouth every 8 (eight) hours as needed for Pain.  Dispense: 90 tablet; Refill: 1    Sacroiliac joint dysfunction of left side  -     traMADol (ULTRAM) 50 mg tablet; Take 1 tablet (50 mg total) by mouth every 8 (eight) hours as needed for Pain.  Dispense: 90 tablet; Refill: 1      RTC in 2-3 months.     Total time spent face to face with patient was 25 minutes.   More than 50% of that time was spent in counseling on diagnosis , prognosis and treatment options.   I also  patient  on common and most usual side effect of prescribed medications.    I reviewed Primary care , and other specialty's notes to better coordinate patient's  care.   All questions were answered, and patient voiced understanding.

## 2018-03-09 ENCOUNTER — CLINICAL SUPPORT (OUTPATIENT)
Dept: REHABILITATION | Facility: HOSPITAL | Age: 38
End: 2018-03-09
Attending: PHYSICAL MEDICINE & REHABILITATION
Payer: MEDICAID

## 2018-03-09 DIAGNOSIS — G89.29 CHRONIC LEFT-SIDED LOW BACK PAIN WITHOUT SCIATICA: ICD-10-CM

## 2018-03-09 DIAGNOSIS — G89.29 LUMBOSACRAL PAIN, CHRONIC: ICD-10-CM

## 2018-03-09 DIAGNOSIS — M54.50 CHRONIC LEFT-SIDED LOW BACK PAIN WITHOUT SCIATICA: ICD-10-CM

## 2018-03-09 DIAGNOSIS — M54.50 LUMBOSACRAL PAIN, CHRONIC: ICD-10-CM

## 2018-03-09 PROCEDURE — 97110 THERAPEUTIC EXERCISES: CPT | Mod: PO

## 2018-03-10 NOTE — PROGRESS NOTES
Physical Therapy Daily Note     Name: Slime Bradshaw  Clinic Number: [id number]  Diagnosis:   Encounter Diagnoses   Name Primary?    Lumbosacral pain, chronic     Chronic left-sided low back pain without sciatica      Physician: Yasmeen Narvaez MD Eval date: 1/2/18  Visit #:8   Time In: 9:00  Time Out: 9:30  Treatment time: 30    Subjective     Pt reports: Pt reported that he has been stretching a little more and is feeling a little better.  He also mentioned that his Dr asked him about epidural injections.  He reported that he does not see any difference with the heel lift.  Pain Scale: Slime rates pain on a scale of 0-10 to be 5 currently.    Objective   Pt with a leg length difference of 1.5 cm L longer than the R.  L side of pelvis elevated on the L as compared to the R    Lower Extremity Strength - not reassesed  Right LE  Left LE    Knee extension: 3+/5 Knee extension: 3+/5   Knee flexion: 2/5 Knee flexion: 2/5   Hip flexion: 2/5 Hip flexion: 2/5   Hip extension:  3/5 Hip extension: 2/5   Hip abduction: 4/5 Hip abduction: 4/5   Hip adduction: 5/5 Hip adduction: 3/5               Ankle dorsiflexion:   5/5 Ankle dorsiflexion:   5/5   Ankle plantarflexion: NT Ankle plantarflexion: NT        Slime received individual therapeutic exercises to develop Lumbar/corestrength, endurance, ROM, flexibility, posture and core stabilization for 30 minutes including:  Pt received Manual therapy x 10 min   Soft tissue mobilization L lumbar paraspinals    Therapeutic exercise x 15 min,  Gluteal sets - submaximal 10 x 2 - np  Supine hip add with a volley ball 10 x 3 - np  Supine B hip abd with green TB 10 x 3 - np  Piriformis stretch 3 x 30 sec on L & R - np  Lower trunk rotation with orange sports cord 10 x 3 to the L & R        Tx not performed today:  TM ambulation x 5 minutes @ 3.0 mph--NP  Supine hamstring stretch with belt assist 3 x 20 sec  Supine sciatic nerve glide  on (L) x 20 - np  pirifomis stretch 3 x 20 sec  TrA set x 20 with 5 sec hold x 20  Tra set with Hooklying abd/ER (U) with GTB x 10 each  TrA set with Hooklying hip abd/ER (B) GTB x 10  TrA set with Hip add isometric x 20   Bridging 2 x 10  Prone press Ups ( x 10 with 5 sec hold)   Slime received the following manual therapy techniques: Soft tissue Mobilization were applied to the: lumbar paraspinals/piriformis for 15 minutes including:  Long axis distraction (B)LE ( fair relief)  Gentle lumbar distraction in hooklying - np  STM (L) lumbar paraspinals/piriformis area - np   Pt received Dry Needling:  L gluteal   L Cuneals  B L 5  B L4        Patient education: Discussed with Pt that he may benefit from a built up shoe to address his leg length discrepency  No spiritual or educational barriers to learning      Assessment     Patient tolerated randa Tx fairly well.  He continues to have limited ability to maintain sustained postures for prolonged periods of time. He reported continued pain in his L gluteal and lower lumbar paraspinals  Tolerated LTR with resistance well.   He staed that the bolster under his knees gave him some relief and was told wheree he could get on ([facility] on line)   This is a 37 y.o. male referred to outpatient physical therapy and presents with a medical diagnosis of   Encounter Diagnoses   Name Primary?    Lumbosacral pain, chronic     Chronic left-sided low back pain without sciatica     and demonstrates limitations as described in the problem list.  Pt will continue to benefit from skilled outpatient physical therapy to address the deficits listed in the problem list, provide pt/family education and to maximize pt's level of independence in the home and community environment.     Goals to be met in 8 weeks:   1) Pt will be independent and report consistency in  performing HEP to maximize benefit from PT  2) Pt will reprt  lumbosacral ROM is no longer stuff and improve performance  of ADLS  3) Pt will report use of home modalities for pain management.   4) Pt will report one degree less of pain  5/7 days  5) Pt will report interrupted sleep no more than twice a night.  6) Pt will report improved ability to return to normal activity  Re-assessment due on or before:  2/2/18   Plan     Continue with established Plan of Care towards PT goals.    Therapist: Van Eugene, PT  3/9/2018

## 2018-04-05 ENCOUNTER — TELEPHONE (OUTPATIENT)
Dept: FAMILY MEDICINE | Facility: CLINIC | Age: 38
End: 2018-04-05

## 2018-04-05 NOTE — TELEPHONE ENCOUNTER
----- Message from Rachel Silver sent at 4/5/2018  8:40 AM CDT -----  Contact: spouse- Liliane  Pt is asking for an appt today for vomiting and diarrhea. Pt has [facility] and no available appts.  [phone]

## 2018-04-10 ENCOUNTER — TELEPHONE (OUTPATIENT)
Dept: FAMILY MEDICINE | Facility: CLINIC | Age: 38
End: 2018-04-10

## 2018-04-10 NOTE — TELEPHONE ENCOUNTER
----- Message from Olamide Daily sent at 4/10/2018  2:05 PM CDT -----  Contact: Wife  Pt's wife states pt was seen in urgent care for nausea and vomiting and still not feeling well. Offered next available with all providers at clinic. Pt declined. Pt can be reached @ [phone].

## 2018-04-10 NOTE — TELEPHONE ENCOUNTER
Patient requesting an appointment sooner that 5/3/2018.  Patient stated he was seen in Urgent Care for nausea and vomiting, was prescribed medication but medication is making nausea worse.  Would appreciate to be seen sooner.  Please advise.

## 2018-05-07 ENCOUNTER — OFFICE VISIT (OUTPATIENT)
Dept: PHYSICAL MEDICINE AND REHAB | Facility: CLINIC | Age: 38
End: 2018-05-07
Payer: MEDICAID

## 2018-05-07 VITALS
WEIGHT: 227.19 LBS | DIASTOLIC BLOOD PRESSURE: 73 MMHG | BODY MASS INDEX: 29.16 KG/M2 | HEIGHT: 74 IN | HEART RATE: 63 BPM | SYSTOLIC BLOOD PRESSURE: 125 MMHG

## 2018-05-07 DIAGNOSIS — M54.50 LUMBOSACRAL PAIN, CHRONIC: ICD-10-CM

## 2018-05-07 DIAGNOSIS — M47.817 FACET ARTHRITIS OF LUMBOSACRAL REGION: Primary | ICD-10-CM

## 2018-05-07 DIAGNOSIS — M54.50 CHRONIC LEFT-SIDED LOW BACK PAIN WITHOUT SCIATICA: ICD-10-CM

## 2018-05-07 DIAGNOSIS — M53.3 SI (SACROILIAC) JOINT DYSFUNCTION: ICD-10-CM

## 2018-05-07 DIAGNOSIS — G89.29 CHRONIC LEFT-SIDED LOW BACK PAIN WITHOUT SCIATICA: ICD-10-CM

## 2018-05-07 DIAGNOSIS — M53.3 SACROILIAC JOINT DYSFUNCTION OF LEFT SIDE: ICD-10-CM

## 2018-05-07 DIAGNOSIS — G89.29 LUMBOSACRAL PAIN, CHRONIC: ICD-10-CM

## 2018-05-07 PROCEDURE — 99213 OFFICE O/P EST LOW 20 MIN: CPT | Mod: PBBFAC | Performed by: PHYSICAL MEDICINE & REHABILITATION

## 2018-05-07 PROCEDURE — 99214 OFFICE O/P EST MOD 30 MIN: CPT | Mod: S$PBB,,, | Performed by: PHYSICAL MEDICINE & REHABILITATION

## 2018-05-07 PROCEDURE — 99999 PR PBB SHADOW E&M-EST. PATIENT-LVL III: CPT | Mod: PBBFAC,,, | Performed by: PHYSICAL MEDICINE & REHABILITATION

## 2018-05-07 RX ORDER — TRAMADOL HYDROCHLORIDE 50 MG/1
50 TABLET ORAL EVERY 8 HOURS PRN
Qty: 90 TABLET | Refills: 2 | Status: SHIPPED | OUTPATIENT
Start: 2018-05-07 | End: 2018-08-08 | Stop reason: SDUPTHER

## 2018-05-07 RX ORDER — GABAPENTIN 100 MG/1
CAPSULE ORAL
Qty: 150 CAPSULE | Refills: 6 | Status: SHIPPED | OUTPATIENT
Start: 2018-05-07 | End: 2019-01-17

## 2018-05-07 NOTE — PROGRESS NOTES
Subjective:       Patient ID: Slime Bradshaw is a 37 y.o. male.    Chief Complaint: Back Pain    Back Pain   Pertinent negatives include no chest pain, dysuria, headaches, numbness or weakness.      Slime Ledezma is 36 y/o male who returns  to clinic for   Worsening of chronic back pain.   LCV 03/7/18.   On LCV , he was referred to Physical therapy, given NSAIDs, and Tramadol, but he returns to clinic with statement that pain is the same.  He reports no improvement since LCV.  PT is not helping much.   Tramadol helps a little bit.  He is now ready for injection.  Initially when asked, he reported no obvious injury, but today he states that he was a he was in  service.  and now he is asking for a disability with [employer].     Back Pain Description:  Length: pain is chronic pain. Length >  15  Year(s),.   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   He states that he at that time reported for the same pain.   He was later in college basket bal;l, with multiple falls, he is now doing  gym, and weight , can bench press 250 lbs, lying down, can do free style ~ 100 lbs.   Multiple  past, recent injury, falls.  Intensity:  CURRENT   6/10,  AVERAGE  Pain   3/10. at BEST   1-2/10 , At WORST   8/10 on the WORST day.   Location: pain is localized at Rt side of back.   It is only  Back pain and no leg pain.  Radiation: Positive to buttocks. Negative to legs.   Timing : constant , + morning pain, w/o stiffness ,as long as I walk I have no problem,   If I walk too long or sit too long I have a problem.   QUALITY:  Dull pain with sharp pain with  m movement,   NO neuropathic : burning, tingling, numbness  Negative leg weakness.   Worsening factors: prolong sitting,. Being in same position, leaning toward left side and sitting feel better   Alleviating factors: lying down has a pain  Symptoms interfere with daily activity, sleeping and work.   Current medications: Steroids, Robaxin.  Sometimes in past Zanaflex help, Flexeril   Makes him too sleepy.;   Failed medications:  Ibuprofen 800 mg,.  Prior procedures. Torn ACL in Rt knee while playing basket ball.   PT/OT: none  Patient denies night fever/night sweats, bowel incontinence, significant weight loss and significant motor weakness ( red flags).  Patient denies any suicidal or homicidal ideations.   He is here for evaluation and treatment.    No past medical history on file.    Past Surgical History:   Procedure Laterality Date    KNEE ARTHROSCOPY W/ ACL RECONSTRUCTION      right    WISDOM TOOTH EXTRACTION         Family History   Problem Relation Age of Onset    Cancer Father         throat-- smoker    Hypertension Mother     Diabetes Mother     Hypertension Brother        Social History     Social History    Marital status: Single     Spouse name: N/A    Number of children: N/A    Years of education: N/A     Social History Main Topics    Smoking status: Never Smoker    Smokeless tobacco: None    Alcohol use 0.0 - 1.0 oz/week     0 - 2 drink(s) per week    Drug use: No    Sexual activity: Yes     Partners: Female     Other Topics Concern    None     Social History Narrative    Works as a  for [employer]       Current Outpatient Prescriptions   Medication Sig Dispense Refill    buPROPion (WELLBUTRIN) 75 MG tablet Take 1 tablet (75 mg total) by mouth 2 (two) times daily. (Patient taking differently: Take 75 mg by mouth 2 (two) times daily. ) 60 tablet 5    gabapentin (NEURONTIN) 100 MG capsule Take one cap in am , pm, and 3 cap at bedtime 150 capsule 6    traMADol (ULTRAM) 50 mg tablet Take 1 tablet (50 mg total) by mouth every 8 (eight) hours as needed for Pain. 90 tablet 2    zolpidem (AMBIEN) 5 MG Tab Take 1 tablet (5 mg total) by mouth nightly as needed. 30 tablet 3     No current facility-administered medications for this visit.        Review of patient's allergies indicates:   Allergen Reactions     Hydrocodone Itching     Review of Systems   Constitutional: Negative for appetite change and fatigue.   Eyes: Negative for visual disturbance.   Respiratory: Negative for shortness of breath.    Cardiovascular: Negative for chest pain.   Gastrointestinal: Negative for constipation and diarrhea.   Genitourinary: Negative for dysuria, frequency and urgency.   Musculoskeletal: Positive for back pain. Negative for arthralgias, gait problem, joint swelling, myalgias, neck pain and neck stiffness.   Neurological: Negative for dizziness, tremors, weakness, numbness and headaches.   Psychiatric/Behavioral: Negative for dysphoric mood.   All other systems reviewed and are negative.        Objective:      Physical Exam    GENERAL: The patient is alert, oriented, pleasant.  HEENT: PERRLA  NECK: supple, no masses,    CV: S1S2, RRR, no murmurs, rales.  LUNGS: CTA bilateral.   ABDOMEN: soft, non-tender, non distended, bowel sounds nl.  EXTREMITIES: no edema, +2 pulses DP, b/l  SKIN: no skin rash, no skin breakdowns.  MUSCULOSKELETAL:   Gait : normal, walks with no AD.  Walks on toes/heels w/o difficulties.  Cervical spine: full AROM in cervical spine   Lumbar spine, decreased active range of motion in all planes, flexion to 90 degrees, with pain on extention, that is limited to 0.  Side bending and rotation to 35-40 degrees, b/l, with pain on left side bellow the waist line.  Positive facet loading on left.   Positive Tomas sign on Lt.  Straight leg raising Negative bilaterally.   Minimal movement of Left SI joint compare to Right.  Full range of motion in all joints x4 extremities.   Muscle strength 5/5 throughout x4 extremities.   No  joint laxity throughout x4 extremities.   NEUROLOGIC: Cranial nerves II through XII intact.   Deep tendon reflexes is normal, +2 in the upper and lower extremities bilaterally.   Muscle tone is normal.   Sensory is intact to light touch and pinprick throughout x4 extremities.     MRI of Lumbar  spine ( 2/16/18) showed:  Lumbar spine alignment is within normal limits. The vertebral body heights are well maintained, with no fracture.  No marrow signal abnormality suspicious for an infiltrative process.  Incidental note is made of a focal area of sclerosis adjacent to the left sacroiliac joint likely representing enostosis.    There are findings of multi-level lumbar spondylosis, as below.  L1-L2: There is no focal disc herniation. No significant spinal canal or neural foraminal narrowing.  L2-L3: There is no focal disc herniation. No significant spinal canal or neural foraminal narrowing.  L3-L4: There is no focal disc herniation. No significant spinal canal or neural foraminal narrowing.  L4-L5: There is no focal disc herniation. No significant spinal canal or neural foraminal narrowing.  L5-S1: Moderate bilateral facet arthropathy without significant spinal canal or neural foraminal narrowing.  The conus is normal in appearance, and terminates at the L1-L2 level.   The adjacent soft tissue structures show no significant abnormalities.  Impression:  Bilateral facet arthropathy at L5-S1 without significant spinal canal stenosis or neural foraminal narrowing.      Assessment:       1. Facet arthritis of lumbosacral region    2. Lumbosacral pain, chronic    3. Chronic left-sided low back pain without sciatica    4. SI (sacroiliac) joint dysfunction    5. Sacroiliac joint dysfunction of left side        Plan:       Facet arthritis of lumbosacral region  -     IR Facet Inj Lumbar 1st Vert Bi; Future  -     traMADol (ULTRAM) 50 mg tablet; Take 1 tablet (50 mg total) by mouth every 8 (eight) hours as needed for Pain.  Dispense: 90 tablet; Refill: 2  -     gabapentin (NEURONTIN) 100 MG capsule; Take one cap in am , pm, and 3 cap at bedtime  Dispense: 150 capsule; Refill: 6    Lumbosacral pain, chronic  -     IR Facet Inj Lumbar 1st Vert Bi; Future  -     traMADol (ULTRAM) 50 mg tablet; Take 1 tablet (50 mg total)  by mouth every 8 (eight) hours as needed for Pain.  Dispense: 90 tablet; Refill: 2  -     gabapentin (NEURONTIN) 100 MG capsule; Take one cap in am , pm, and 3 cap at bedtime  Dispense: 150 capsule; Refill: 6    Chronic left-sided low back pain without sciatica  -     IR Facet Inj Lumbar 1st Vert Bi; Future  -     traMADol (ULTRAM) 50 mg tablet; Take 1 tablet (50 mg total) by mouth every 8 (eight) hours as needed for Pain.  Dispense: 90 tablet; Refill: 2  -     gabapentin (NEURONTIN) 100 MG capsule; Take one cap in am , pm, and 3 cap at bedtime  Dispense: 150 capsule; Refill: 6    SI (sacroiliac) joint dysfunction  -     IR Facet Inj Lumbar 1st Vert Bi; Future  -     traMADol (ULTRAM) 50 mg tablet; Take 1 tablet (50 mg total) by mouth every 8 (eight) hours as needed for Pain.  Dispense: 90 tablet; Refill: 2  -     gabapentin (NEURONTIN) 100 MG capsule; Take one cap in am , pm, and 3 cap at bedtime  Dispense: 150 capsule; Refill: 6    Sacroiliac joint dysfunction of left side  -     traMADol (ULTRAM) 50 mg tablet; Take 1 tablet (50 mg total) by mouth every 8 (eight) hours as needed for Pain.  Dispense: 90 tablet; Refill: 2  -     gabapentin (NEURONTIN) 100 MG capsule; Take one cap in am , pm, and 3 cap at bedtime  Dispense: 150 capsule; Refill: 6    Patient with worsening of chronic low back pain, he complains about very specific spot on Lt side of his back, that correlates with dx. Of Facet arthropathy.  Bilateral facet arthropathy at L5-S1 without significant spinal canal stenosis or neural foraminal narrowing.      -- Dx.   MRI of Lumbar spine results discussed with pt.    -- pain management:  Will resume PT,   Will resume Tramadol,   Will be referred for Lt L5-S1 facet joint injections, by IR.    -- disability evaluation,   I informed pt that I do not evaluate pts for disability.    RTC in 3 months.     Total time spent face to face with patient was 25 minutes.   More than 50% of that time was spent in counseling  on diagnosis , prognosis and treatment options.   I also  patient  on common and most usual side effect of prescribed medications.    I reviewed Primary care , and other specialty's notes to better coordinate patient's  care.   All questions were answered, and patient voiced understanding.

## 2018-05-21 ENCOUNTER — HOSPITAL ENCOUNTER (OUTPATIENT)
Dept: INTERVENTIONAL RADIOLOGY/VASCULAR | Facility: HOSPITAL | Age: 38
Discharge: HOME | End: 2018-05-21
Attending: PHYSICAL MEDICINE & REHABILITATION

## 2018-05-21 VITALS
SYSTOLIC BLOOD PRESSURE: 148 MMHG | RESPIRATION RATE: 18 BRPM | OXYGEN SATURATION: 95 % | DIASTOLIC BLOOD PRESSURE: 100 MMHG | HEART RATE: 65 BPM

## 2018-05-21 DIAGNOSIS — G89.29 LUMBOSACRAL PAIN, CHRONIC: ICD-10-CM

## 2018-05-21 DIAGNOSIS — G89.29 CHRONIC LEFT-SIDED LOW BACK PAIN WITHOUT SCIATICA: ICD-10-CM

## 2018-05-21 DIAGNOSIS — M47.817 FACET ARTHRITIS OF LUMBOSACRAL REGION: ICD-10-CM

## 2018-05-21 DIAGNOSIS — M53.3 SI (SACROILIAC) JOINT DYSFUNCTION: ICD-10-CM

## 2018-05-21 DIAGNOSIS — M54.50 LUMBOSACRAL PAIN, CHRONIC: ICD-10-CM

## 2018-05-21 DIAGNOSIS — M54.50 CHRONIC LEFT-SIDED LOW BACK PAIN WITHOUT SCIATICA: ICD-10-CM

## 2018-05-21 PROCEDURE — A4215 STERILE NEEDLE: HCPCS

## 2018-05-21 PROCEDURE — 64493 INJ PARAVERT F JNT L/S 1 LEV: CPT | Mod: LT,,, | Performed by: RADIOLOGY

## 2018-05-21 PROCEDURE — 64493 INJ PARAVERT F JNT L/S 1 LEV: CPT | Mod: LT

## 2018-05-21 PROCEDURE — 63600175 PHARM REV CODE 636 W HCPCS: Performed by: RADIOLOGY

## 2018-05-21 RX ORDER — METHYLPREDNISOLONE ACETATE 40 MG/ML
40 INJECTION, SUSPENSION INTRA-ARTICULAR; INTRALESIONAL; INTRAMUSCULAR; SOFT TISSUE ONCE
Status: COMPLETED | OUTPATIENT
Start: 2018-05-21 | End: 2018-05-21

## 2018-05-21 RX ADMIN — METHYLPREDNISOLONE ACETATE 40 MG: 40 INJECTION, SUSPENSION INTRA-ARTICULAR; INTRALESIONAL; INTRAMUSCULAR; SOFT TISSUE at 09:05

## 2018-05-21 NOTE — PROCEDURES
Radiology Post-Procedure Note    Pre Op Diagnosis: LBP    Post Op Diagnosis: Same    Procedure: left L5-S1 facet injection    Procedure performed by: Dr. Veliz, Dr. Coates    Written Informed Consent Obtained: Yes    Specimen Removed: NO    Estimated Blood Loss: Minimal    Level injected: left L5-S1 facet  Needle used: 22 gauge  Dose:  40 mg Depo-methylprednisolone   1 mL Lidocaine 1% MPF    Patient tolerated procedure well.    Castillo Coates MD  Neurointerventional Fellow  Department of Radiology  [phone]

## 2018-05-21 NOTE — H&P
Radiology History & Physical      SUBJECTIVE:     Chief Complaint: lower back pain in the low back at midline without radiation.    History of Present Illness:  Slime Bradshaw is a 37 y.o. male who presents for left L5-S1 TF GIANNA  No past medical history on file.  Past Surgical History:   Procedure Laterality Date    KNEE ARTHROSCOPY W/ ACL RECONSTRUCTION      right    WISDOM TOOTH EXTRACTION         Home Meds:   Prior to Admission medications    Medication Sig Start Date End Date Taking? Authorizing Provider   buPROPion (WELLBUTRIN) 75 MG tablet Take 1 tablet (75 mg total) by mouth 2 (two) times daily.  Patient taking differently: Take 75 mg by mouth 2 (two) times daily.  3/17/17 3/17/18  Lindsey Lozoya MD   gabapentin (NEURONTIN) 100 MG capsule Take one cap in am , pm, and 3 cap at bedtime 5/7/18   Yasmeen Narvaez MD   traMADol (ULTRAM) 50 mg tablet Take 1 tablet (50 mg total) by mouth every 8 (eight) hours as needed for Pain. 5/7/18 6/6/18  Yasmeen Narvaez MD   zolpidem (AMBIEN) 5 MG Tab Take 1 tablet (5 mg total) by mouth nightly as needed. 11/29/17 5/30/18  Lindsey Lozoya MD     Anticoagulants/Antiplatelets: no anticoagulation    Allergies:   Review of patient's allergies indicates:   Allergen Reactions    Hydrocodone Itching     Sedation History:  no adverse reactions    Review of Systems:   Hematological: no known coagulopathies  Respiratory: no shortness of breath  Cardiovascular: no chest pain  Gastrointestinal: no abdominal pain  Genito-Urinary: no dysuria  Musculoskeletal: negative  Neurological: no TIA or stroke symptoms         OBJECTIVE:     Vital Signs (Most Recent)       Physical Exam:  ASA: 1  Mallampati: 3    General: no acute distress  Mental Status: alert and oriented to person, place and time  HEENT: normocephalic, atraumatic  Chest: unlabored breathing  Abdomen: nondistended  Extremity: moves all extremities    Laboratory  No results found for: INR    Lab Results   Component Value Date     WBC 3.84 (L) 11/13/2014    HGB 15.6 11/13/2014    HCT 43.8 11/13/2014    MCV 87 11/13/2014     11/13/2014      Lab Results   Component Value Date    GLU 98 11/13/2014     11/13/2014    K 4.5 11/13/2014     11/13/2014    CO2 30 (H) 11/13/2014    BUN 14 11/13/2014    CREATININE 1.1 11/13/2014    CALCIUM 9.3 11/13/2014    ALT 31 11/13/2014    AST 24 11/13/2014    ALBUMIN 4.0 11/13/2014    BILITOT 0.4 11/13/2014       ASSESSMENT/PLAN:     Sedation Plan: none  Patient will undergo Left L5-S1 TF GIANNA.    Florentino Gonzalez MD  Radiology

## 2018-05-21 NOTE — DISCHARGE INSTRUCTIONS
For scheduling: Call Lacey at [phone]    For questions or concerns call: [facility] 8 AM- 5PM [phone]. Radiology resident on call [phone].

## 2018-05-21 NOTE — PROGRESS NOTES
GIANNA Back In jection  complete. Pt tolerated well. Discharge instructions and handouts provided. Pt verbalized understanding. Pt refused wheelchair, ambulated out of facility. Gait steady.

## 2018-07-22 DIAGNOSIS — G47.00 INSOMNIA, UNSPECIFIED TYPE: ICD-10-CM

## 2018-07-22 RX ORDER — ZOLPIDEM TARTRATE 5 MG/1
TABLET ORAL
Qty: 30 TABLET | Refills: 0 | OUTPATIENT
Start: 2018-07-22

## 2018-08-08 ENCOUNTER — OFFICE VISIT (OUTPATIENT)
Dept: FAMILY MEDICINE | Facility: CLINIC | Age: 38
End: 2018-08-08
Payer: MEDICAID

## 2018-08-08 ENCOUNTER — NURSE TRIAGE (OUTPATIENT)
Dept: ADMINISTRATIVE | Facility: CLINIC | Age: 38
End: 2018-08-08

## 2018-08-08 VITALS
TEMPERATURE: 98 F | OXYGEN SATURATION: 96 % | HEART RATE: 63 BPM | BODY MASS INDEX: 28.92 KG/M2 | DIASTOLIC BLOOD PRESSURE: 80 MMHG | HEIGHT: 74 IN | SYSTOLIC BLOOD PRESSURE: 146 MMHG | WEIGHT: 225.31 LBS

## 2018-08-08 DIAGNOSIS — I10 ESSENTIAL HYPERTENSION: Primary | ICD-10-CM

## 2018-08-08 DIAGNOSIS — G47.00 INSOMNIA, UNSPECIFIED TYPE: ICD-10-CM

## 2018-08-08 DIAGNOSIS — M54.50 CHRONIC MIDLINE LOW BACK PAIN WITHOUT SCIATICA: ICD-10-CM

## 2018-08-08 DIAGNOSIS — G89.29 CHRONIC MIDLINE LOW BACK PAIN WITHOUT SCIATICA: ICD-10-CM

## 2018-08-08 DIAGNOSIS — F43.0 ACUTE STRESS REACTION: ICD-10-CM

## 2018-08-08 PROBLEM — F33.1 MODERATE EPISODE OF RECURRENT MAJOR DEPRESSIVE DISORDER: Status: ACTIVE | Noted: 2018-08-08

## 2018-08-08 PROBLEM — F41.9 ANXIETY: Status: ACTIVE | Noted: 2018-08-08

## 2018-08-08 PROBLEM — F41.9 ANXIETY: Status: RESOLVED | Noted: 2018-08-08 | Resolved: 2018-08-08

## 2018-08-08 PROCEDURE — 99213 OFFICE O/P EST LOW 20 MIN: CPT | Mod: PBBFAC,PO | Performed by: NURSE PRACTITIONER

## 2018-08-08 PROCEDURE — 99999 PR PBB SHADOW E&M-EST. PATIENT-LVL III: CPT | Mod: PBBFAC,,, | Performed by: NURSE PRACTITIONER

## 2018-08-08 PROCEDURE — 99214 OFFICE O/P EST MOD 30 MIN: CPT | Mod: S$PBB,,, | Performed by: NURSE PRACTITIONER

## 2018-08-08 RX ORDER — ZOLPIDEM TARTRATE 5 MG/1
5 TABLET ORAL NIGHTLY PRN
Qty: 30 TABLET | Refills: 0 | Status: SHIPPED | OUTPATIENT
Start: 2018-08-08 | End: 2018-09-17 | Stop reason: SDUPTHER

## 2018-08-08 RX ORDER — TRAMADOL HYDROCHLORIDE 50 MG/1
50 TABLET ORAL EVERY 8 HOURS PRN
COMMUNITY
Start: 2018-07-15 | End: 2018-09-24 | Stop reason: SDUPTHER

## 2018-08-08 RX ORDER — HYDROCHLOROTHIAZIDE 12.5 MG/1
12.5 CAPSULE ORAL DAILY
Qty: 30 CAPSULE | Refills: 0 | Status: SHIPPED | OUTPATIENT
Start: 2018-08-08 | End: 2018-09-17

## 2018-08-08 RX ORDER — BUPROPION HYDROCHLORIDE 75 MG/1
75 TABLET ORAL 2 TIMES DAILY
Qty: 60 TABLET | Refills: 0 | Status: SHIPPED | OUTPATIENT
Start: 2018-08-08 | End: 2018-09-17

## 2018-08-08 NOTE — TELEPHONE ENCOUNTER
Reason for Disposition   Patient wants to be seen    Protocols used: ST HIGH BLOOD PRESSURE-A-OH    Wife states pt BP has been high for the last 3 days with a HA. Wife denies history of HTN. Wife states BP today is 152/98. Wife advised per protocol, wife verbalizes understanding, and appointment made today in clinic.

## 2018-08-08 NOTE — PROGRESS NOTES
Subjective:       Patient ID: Slime Bradshaw is a 37 y.o. male.    Chief Complaint: Hypertension (Elevated)    37 year old male presents to the clinic with complaint of elevated blood pressure over the last 3 days. He said his blood pressure has been 158/102 and 150/102. He had a mild headache when his blood pressure was elevated. He denied any chest pain, heart palpitations, shortness of breath or swelling to lower extremities. He denies any dizziness or blurred vision. He stopped taking his Wellbutrin, Gabapentin, and Ambien about 6 months ago. He was on the Wellbutrin for stress and he thinks he needs to get back on it. He needed the Ambien to sleep because the Wellbutrin kept him up at night. He is also going to get back on the Gabapentin. He has chronic lower back pain. He only takes Tramadol as needed. He is followed by pain management.       No past medical history on file.  Past Surgical History:   Procedure Laterality Date    KNEE ARTHROSCOPY W/ ACL RECONSTRUCTION      right    WISDOM TOOTH EXTRACTION        reports that he has never smoked. He does not have any smokeless tobacco history on file. He reports that he drinks alcohol. He reports that he does not use drugs.  Review of Systems   Respiratory: Negative for cough, shortness of breath and wheezing.    Cardiovascular: Negative for chest pain, palpitations and leg swelling.   Gastrointestinal: Negative for abdominal pain, blood in stool, constipation, diarrhea, nausea and vomiting.   Musculoskeletal: Negative for gait problem.   Skin: Negative for rash.   Neurological: Positive for headaches. Negative for dizziness and light-headedness.       Objective:      Physical Exam   Constitutional: He is oriented to person, place, and time. He appears well-developed and well-nourished. No distress.   Eyes: Conjunctivae and EOM are normal. Pupils are equal, round, and reactive to light. Right eye exhibits no discharge. Left eye exhibits no discharge. No  scleral icterus.   Neck: Normal range of motion. Neck supple. No JVD present.   Cardiovascular: Normal rate, regular rhythm and normal heart sounds.    No murmur heard.  Pulmonary/Chest: Effort normal and breath sounds normal. No respiratory distress. He has no wheezes. He has no rales.   Abdominal: Soft. Bowel sounds are normal. There is no tenderness.   Musculoskeletal: Normal range of motion. He exhibits no edema.   Neurological: He is alert and oriented to person, place, and time.   Skin: Skin is warm and dry. He is not diaphoretic.   Psychiatric: He has a normal mood and affect. His behavior is normal. Judgment and thought content normal.       Assessment:       1. Essential hypertension    2. Chronic midline low back pain without sciatica    3. Acute stress reaction    4. Insomnia, unspecified type        Plan:         Essential hypertension  -     hydroCHLOROthiazide (MICROZIDE) 12.5 mg capsule; Take 1 capsule (12.5 mg total) by mouth once daily.  Dispense: 30 capsule; Refill: 0  - Start HCTZ 12.5 mg daily    Chronic midline low back pain without sciatica  - restart Gabapentin     Acute stress reaction  -     buPROPion (WELLBUTRIN) 75 MG tablet; Take 1 tablet (75 mg total) by mouth 2 (two) times daily.  Dispense: 60 tablet; Refill: 0  - restart Wellbutrin    Insomnia, unspecified type  -     zolpidem (AMBIEN) 5 MG Tab; Take 1 tablet (5 mg total) by mouth nightly as needed.  Dispense: 30 tablet; Refill: 0  - restart Ambien as needed only for sleep

## 2018-08-08 NOTE — PATIENT INSTRUCTIONS
Restart Wellbutrin 75 mg twice a day  Start Hydrochlorothiazide 12.5 mg po daily  Ambiem 5 mg every hs as needed  Restart Gabapentin

## 2018-09-17 ENCOUNTER — OFFICE VISIT (OUTPATIENT)
Dept: FAMILY MEDICINE | Facility: CLINIC | Age: 38
End: 2018-09-17
Payer: MEDICAID

## 2018-09-17 VITALS
DIASTOLIC BLOOD PRESSURE: 80 MMHG | WEIGHT: 228.19 LBS | TEMPERATURE: 98 F | BODY MASS INDEX: 29.29 KG/M2 | SYSTOLIC BLOOD PRESSURE: 120 MMHG | HEART RATE: 63 BPM | OXYGEN SATURATION: 98 % | HEIGHT: 74 IN

## 2018-09-17 DIAGNOSIS — F39 MOOD DISORDER: Primary | ICD-10-CM

## 2018-09-17 DIAGNOSIS — G47.00 INSOMNIA, UNSPECIFIED TYPE: ICD-10-CM

## 2018-09-17 PROCEDURE — 99999 PR PBB SHADOW E&M-EST. PATIENT-LVL III: CPT | Mod: PBBFAC,,, | Performed by: FAMILY MEDICINE

## 2018-09-17 PROCEDURE — 99213 OFFICE O/P EST LOW 20 MIN: CPT | Mod: PBBFAC,PO | Performed by: FAMILY MEDICINE

## 2018-09-17 PROCEDURE — 99214 OFFICE O/P EST MOD 30 MIN: CPT | Mod: S$PBB,,, | Performed by: FAMILY MEDICINE

## 2018-09-17 RX ORDER — TRAMADOL HYDROCHLORIDE 50 MG/1
50 TABLET ORAL EVERY 8 HOURS PRN
Status: CANCELLED | OUTPATIENT
Start: 2018-09-17

## 2018-09-17 RX ORDER — ZOLPIDEM TARTRATE 5 MG/1
5 TABLET ORAL NIGHTLY PRN
Qty: 30 TABLET | Refills: 0 | Status: CANCELLED | OUTPATIENT
Start: 2018-09-17 | End: 2019-03-18

## 2018-09-17 RX ORDER — BUPROPION HYDROCHLORIDE 150 MG/1
150 TABLET ORAL DAILY
Qty: 30 TABLET | Refills: 11 | Status: SHIPPED | OUTPATIENT
Start: 2018-09-17 | End: 2018-11-07

## 2018-09-17 RX ORDER — ZOLPIDEM TARTRATE 5 MG/1
5 TABLET ORAL NIGHTLY PRN
Qty: 30 TABLET | Refills: 5 | Status: SHIPPED | OUTPATIENT
Start: 2018-09-17 | End: 2019-01-17

## 2018-09-17 NOTE — PROGRESS NOTES
"Subjective:       Patient ID: Slime Bradshaw is a 38 y.o. male.    Chief Complaint: Follow Up/ HTN    marya is here for follow-up. He has not been taking his blood pressure medication. His blood pressure is 120's/70's at home.     He is having anxiety late at night. He has been having issues with anxiety. He states he feels like he wants to go outside and run. He sits in the massage chair or takes the Z-quil at night. He has been anxious for at least a month. He is taking his wellbutrin, but only taking this once a day. He is on 75 mg.       Review of Systems   Psychiatric/Behavioral: Positive for agitation and sleep disturbance. Negative for dysphoric mood, hallucinations, self-injury and suicidal ideas. The patient is nervous/anxious.        Objective:       Vitals:    09/17/18 0927   BP: 120/80   Pulse: 63   Temp: 98.1 °F (36.7 °C)   TempSrc: Oral   SpO2: 98%   Weight: 103.5 kg (228 lb 2.8 oz)   Height: 6' 2" (1.88 m)       Physical Exam   Constitutional: He is oriented to person, place, and time. He appears well-developed and well-nourished. No distress.   HENT:   Head: Normocephalic and atraumatic.   Eyes: Conjunctivae are normal.   Neck: Normal range of motion. Neck supple. Carotid bruit is not present.   Cardiovascular: Normal rate, regular rhythm and normal heart sounds. Exam reveals no gallop and no friction rub.   No murmur heard.  Pulmonary/Chest: Effort normal and breath sounds normal. No respiratory distress. He has no wheezes. He has no rales.   Musculoskeletal: He exhibits no edema.   Lymphadenopathy:     He has no cervical adenopathy.   Neurological: He is alert and oriented to person, place, and time.   Skin: He is not diaphoretic.   Psychiatric: He has a normal mood and affect.       Assessment:       1. Mood disorder    2. Insomnia, unspecified type        Plan:       Slime was seen today for follow up/ htn.    Diagnoses and all orders for this visit:    Mood disorder  -     buPROPion " (WELLBUTRIN XL) 150 MG TB24 tablet; Take 1 tablet (150 mg total) by mouth once daily.  Increase wellbutrin to once a day    Insomnia, unspecified type  -     zolpidem (AMBIEN) 5 MG Tab; Take 1 tablet (5 mg total) by mouth nightly as needed (insomnia).  Restarting ambien for sleep    Blood pressure is controlled without medication.

## 2018-09-24 ENCOUNTER — OFFICE VISIT (OUTPATIENT)
Dept: PHYSICAL MEDICINE AND REHAB | Facility: CLINIC | Age: 38
End: 2018-09-24
Payer: MEDICAID

## 2018-09-24 VITALS
HEART RATE: 59 BPM | WEIGHT: 224.19 LBS | HEIGHT: 74 IN | DIASTOLIC BLOOD PRESSURE: 76 MMHG | SYSTOLIC BLOOD PRESSURE: 134 MMHG | BODY MASS INDEX: 28.77 KG/M2

## 2018-09-24 DIAGNOSIS — G89.29 LUMBOSACRAL PAIN, CHRONIC: ICD-10-CM

## 2018-09-24 DIAGNOSIS — M54.50 CHRONIC LEFT-SIDED LOW BACK PAIN WITHOUT SCIATICA: ICD-10-CM

## 2018-09-24 DIAGNOSIS — G89.29 CHRONIC LEFT-SIDED LOW BACK PAIN WITHOUT SCIATICA: ICD-10-CM

## 2018-09-24 DIAGNOSIS — M47.817 FACET ARTHRITIS OF LUMBOSACRAL REGION: Primary | ICD-10-CM

## 2018-09-24 DIAGNOSIS — M54.50 LUMBOSACRAL PAIN, CHRONIC: ICD-10-CM

## 2018-09-24 PROCEDURE — 99999 PR PBB SHADOW E&M-EST. PATIENT-LVL III: CPT | Mod: PBBFAC,,, | Performed by: PHYSICAL MEDICINE & REHABILITATION

## 2018-09-24 PROCEDURE — 99214 OFFICE O/P EST MOD 30 MIN: CPT | Mod: S$PBB,,, | Performed by: PHYSICAL MEDICINE & REHABILITATION

## 2018-09-24 PROCEDURE — 99213 OFFICE O/P EST LOW 20 MIN: CPT | Mod: PBBFAC | Performed by: PHYSICAL MEDICINE & REHABILITATION

## 2018-09-24 RX ORDER — TIZANIDINE 2 MG/1
4 TABLET ORAL EVERY 8 HOURS PRN
Qty: 90 TABLET | Refills: 2 | Status: SHIPPED | OUTPATIENT
Start: 2018-09-24 | End: 2018-10-24

## 2018-09-24 RX ORDER — TRAMADOL HYDROCHLORIDE 50 MG/1
50 TABLET ORAL EVERY 8 HOURS PRN
Qty: 90 TABLET | Refills: 2 | Status: SHIPPED | OUTPATIENT
Start: 2018-09-24 | End: 2018-11-07 | Stop reason: SDUPTHER

## 2018-09-24 NOTE — PROGRESS NOTES
"Subjective:       Patient ID: Slime Bradshaw is a 38 y.o. male.    Chief Complaint: Back Pain    Back Pain   Pertinent negatives include no chest pain, dysuria, headaches, numbness or weakness.      Slime Ledezma is 39 y/o male who returns  to clinic for chronic back pain.   LCV 05/7/18.   Today, he states that his left side back pain improved after GIANNA, although he can still get muscle spasms. But now, he c/o Rt sided back pain, similar to pain on left side he had before GIANNA.  He received Lt L5-S1 TF GIANNA on 5/21/18, by IR, and that injection helped > 50% and is still lasting. He was able to return to gym, and now having RT sided back pain, he would like to get GIANNA to his RT side.       Back Pain Description:  Length: pain is chronic pain. Length >  15 -16 Year(s),.   He started experiencing low back pain in his 20's, while in  service, and   now he is asking for a disability with [employer].   He states that he at that time , he reported for the same pain.   He was later in college basket ball with multiple falls, he is now doing  gym, and weight , can bench press 250 lbs, lying down, can do free style ~ 100 lbs.   Multiple  past, recent injury, falls.  Intensity:  CURRENT   3/10,  AVERAGE  Pain   3/10. at BEST   1-2/10 , At WORST   8/10 on the WORST day.   Location: pain is localized at Rt side of back.   It is only  Back pain and no leg pain.  Radiation: Positive to buttocks. Negative to legs.   Timing : constant , + morning pain, w/o stiffness ,he stated that "as long as I walk I have no problem, but I walk too long or sit too long I have a problem".   QUALITY:  Dull pain with sharp pain with  m movement,   NO neuropathic : burning, tingling, numbness  Negative leg weakness.   Worsening factors: prolong sitting,. Being in same position, leaning toward left side and sitting feel better   Alleviating factors: lying down has a pain  Symptoms interfere with daily activity, sleeping and work.   Current " medications: Steroids, Robaxin. Sometimes in past Zanaflex help, Flexeril   Makes him too sleepy.;   Failed medications:  Ibuprofen 800 mg, Gabapentin 100 mg , 1+1+3, Tramadol, prn.  Prior procedures. He had repair ofTorn ACL in Rt knee while playing basket ball.   PT/OT: none  Patient denies night fever/night sweats, bowel incontinence, significant weight loss and significant motor weakness ( red flags).  Patient denies any suicidal or homicidal ideations.   He is here for follow up, and treatment.    No past medical history on file.    Past Surgical History:   Procedure Laterality Date    KNEE ARTHROSCOPY W/ ACL RECONSTRUCTION      right    WISDOM TOOTH EXTRACTION         Family History   Problem Relation Age of Onset    Cancer Father         throat-- smoker    Hypertension Mother     Diabetes Mother     Hypertension Brother        Social History     Socioeconomic History    Marital status:      Spouse name: None    Number of children: None    Years of education: None    Highest education level: None   Social Needs    Financial resource strain: None    Food insecurity - worry: None    Food insecurity - inability: None    Transportation needs - medical: None    Transportation needs - non-medical: None   Occupational History    None   Tobacco Use    Smoking status: Never Smoker   Substance and Sexual Activity    Alcohol use: Yes     Alcohol/week: 0.0 - 1.0 oz    Drug use: No    Sexual activity: Yes     Partners: Female   Other Topics Concern    None   Social History Narrative    Works as a  for [employer]       Current Outpatient Medications   Medication Sig Dispense Refill    buPROPion (WELLBUTRIN XL) 150 MG TB24 tablet Take 1 tablet (150 mg total) by mouth once daily. 30 tablet 11    gabapentin (NEURONTIN) 100 MG capsule Take one cap in am , pm, and 3 cap at bedtime 150 capsule 6    tiZANidine (ZANAFLEX) 2 MG tablet Take 2 tablets (4 mg total) by mouth every 8 (eight)  hours as needed. 90 tablet 2    traMADol (ULTRAM) 50 mg tablet Take 1 tablet (50 mg total) by mouth every 8 (eight) hours as needed for Pain. 90 tablet 2    zolpidem (AMBIEN) 5 MG Tab Take 1 tablet (5 mg total) by mouth nightly as needed (insomnia). 30 tablet 5     No current facility-administered medications for this visit.        Review of patient's allergies indicates:   Allergen Reactions    Hydrocodone Itching     Review of Systems   Constitutional: Negative for appetite change and fatigue.   Eyes: Negative for visual disturbance.   Respiratory: Negative for shortness of breath.    Cardiovascular: Negative for chest pain.   Gastrointestinal: Negative for constipation and diarrhea.   Genitourinary: Negative for dysuria, frequency and urgency.   Musculoskeletal: Positive for back pain. Negative for arthralgias, gait problem, joint swelling, myalgias, neck pain and neck stiffness.   Neurological: Negative for dizziness, tremors, weakness, numbness and headaches.   Psychiatric/Behavioral: Negative for dysphoric mood.   All other systems reviewed and are negative.        Objective:      Physical Exam    GENERAL: The patient is alert, oriented, pleasant.  HEENT: PERRLA  NECK: supple, no masses,    CV: S1S2, RRR, no murmurs, rales.  LUNGS: CTA bilateral.   ABDOMEN: soft, non-tender, non distended, bowel sounds nl.  EXTREMITIES: no edema, +2 pulses DP, b/l  SKIN: no skin rash, no skin breakdowns.  MUSCULOSKELETAL:   Gait : normal, walks with no AD.  Walks on toes/heels w/o difficulties.  Cervical spine: full AROM in cervical spine   Lumbar spine, decreased active range of motion in all planes, flexion to 90 degrees, with pain on extention, that is limited to 0.  Side bending and rotation to 35-40 degrees, b/l, with pain on left side bellow the waist line.  Positive facet loading on left.   Positive Tomas sign on Lt.  Straight leg raising Negative bilaterally.   Minimal movement of Left SI joint compare to  Right.  Full range of motion in all joints x4 extremities.   Muscle strength 5/5 throughout x4 extremities.   No  joint laxity throughout x4 extremities.   NEUROLOGIC: Cranial nerves II through XII intact.   Deep tendon reflexes is normal, +2 in the upper and lower extremities bilaterally.   Muscle tone is normal.   Sensory is intact to light touch and pinprick throughout x4 extremities.     MRI of Lumbar spine ( 2/16/18) showed:  Lumbar spine alignment is within normal limits. The vertebral body heights are well maintained, with no fracture.  No marrow signal abnormality suspicious for an infiltrative process.  Incidental note is made of a focal area of sclerosis adjacent to the left sacroiliac joint likely representing enostosis.    There are findings of multi-level lumbar spondylosis, as below.  L1-L2: There is no focal disc herniation. No significant spinal canal or neural foraminal narrowing.  L2-L3: There is no focal disc herniation. No significant spinal canal or neural foraminal narrowing.  L3-L4: There is no focal disc herniation. No significant spinal canal or neural foraminal narrowing.  L4-L5: There is no focal disc herniation. No significant spinal canal or neural foraminal narrowing.  L5-S1: Moderate bilateral facet arthropathy without significant spinal canal or neural foraminal narrowing.  The conus is normal in appearance, and terminates at the L1-L2 level.   The adjacent soft tissue structures show no significant abnormalities.  Impression:  Bilateral facet arthropathy at L5-S1 without significant spinal canal stenosis or neural foraminal narrowing.      Assessment:       1. Facet arthritis of lumbosacral region    2. Lumbosacral pain, chronic    3. Chronic left-sided low back pain without sciatica        Plan:       Facet arthritis of lumbosacral region  -     IR Facet Inj Lumbar 1st Vert Bi; Future  -     tiZANidine (ZANAFLEX) 2 MG tablet; Take 2 tablets (4 mg total) by mouth every 8 (eight)  hours as needed.  Dispense: 90 tablet; Refill: 2    Lumbosacral pain, chronic  -     IR Facet Inj Lumbar 1st Vert Bi; Future  -     tiZANidine (ZANAFLEX) 2 MG tablet; Take 2 tablets (4 mg total) by mouth every 8 (eight) hours as needed.  Dispense: 90 tablet; Refill: 2    Chronic left-sided low back pain without sciatica  -     IR Facet Inj Lumbar 1st Vert Bi; Future  -     tiZANidine (ZANAFLEX) 2 MG tablet; Take 2 tablets (4 mg total) by mouth every 8 (eight) hours as needed.  Dispense: 90 tablet; Refill: 2    Other orders  -     traMADol (ULTRAM) 50 mg tablet; Take 1 tablet (50 mg total) by mouth every 8 (eight) hours as needed for Pain.  Dispense: 90 tablet; Refill: 2    Patient with worsening of chronic low back pain, he complains about very specific spot now on Rt side of his back, that correlates with dx. Of Facet arthropathy.  Bilateral facet arthropathy at L5-S1 without significant spinal canal stenosis or neural foraminal narrowing.      -- Dx.   MRI of Lumbar spine results discussed with pt, again.    -- pain management:  Completed  PT, that did not help much.  Will resume Tramadol.    Opioid Risk Score     None         reviewed and appropriate.  Tramadol refills on 07/16, 6/14, 5/15/18.    -- Will refer for Rt  L5-S1 facet joint injections, by IR.    RTC in 3- 4 months.     Total time spent face to face with patient was 25 minutes.   More than 50% of that time was spent in counseling on diagnosis , prognosis and treatment options.   I also  patient  on common and most usual side effect of prescribed medications.    I reviewed Primary care , and other specialty's notes to better coordinate patient's  care.   All questions were answered, and patient voiced understanding.

## 2018-10-01 ENCOUNTER — TELEPHONE (OUTPATIENT)
Dept: PHYSICAL MEDICINE AND REHAB | Facility: CLINIC | Age: 38
End: 2018-10-01

## 2018-11-05 ENCOUNTER — TELEPHONE (OUTPATIENT)
Dept: FAMILY MEDICINE | Facility: CLINIC | Age: 38
End: 2018-11-05

## 2018-11-05 NOTE — TELEPHONE ENCOUNTER
----- Message from Saira Merida sent at 11/5/2018 12:40 PM CST -----  Contact: wife  Pts wife is calling to speak with staff regarding pt having anxiety. Please call pts wife at [phone].

## 2018-11-05 NOTE — TELEPHONE ENCOUNTER
Returned call. Patient states meds not working for him. Per provider (Medicaid) approved patient to come in to be seen to f/u. Scheduled patient for 11/7/18 to be seen for F/u. Patient voiced understanding.

## 2018-11-07 ENCOUNTER — OFFICE VISIT (OUTPATIENT)
Dept: FAMILY MEDICINE | Facility: CLINIC | Age: 38
End: 2018-11-07
Payer: MEDICAID

## 2018-11-07 VITALS
HEART RATE: 64 BPM | OXYGEN SATURATION: 96 % | WEIGHT: 227.75 LBS | TEMPERATURE: 98 F | SYSTOLIC BLOOD PRESSURE: 126 MMHG | BODY MASS INDEX: 29.23 KG/M2 | DIASTOLIC BLOOD PRESSURE: 82 MMHG | HEIGHT: 74 IN

## 2018-11-07 DIAGNOSIS — F43.0 ACUTE STRESS REACTION: Primary | ICD-10-CM

## 2018-11-07 PROCEDURE — 99214 OFFICE O/P EST MOD 30 MIN: CPT | Mod: S$PBB,,, | Performed by: FAMILY MEDICINE

## 2018-11-07 PROCEDURE — 99999 PR PBB SHADOW E&M-EST. PATIENT-LVL III: CPT | Mod: PBBFAC,,, | Performed by: FAMILY MEDICINE

## 2018-11-07 PROCEDURE — 99213 OFFICE O/P EST LOW 20 MIN: CPT | Mod: PBBFAC,PO | Performed by: FAMILY MEDICINE

## 2018-11-07 RX ORDER — TRAMADOL HYDROCHLORIDE 50 MG/1
TABLET ORAL
COMMUNITY
Start: 2018-10-27 | End: 2019-01-24 | Stop reason: SDUPTHER

## 2018-11-07 RX ORDER — FLUOXETINE HYDROCHLORIDE 20 MG/1
20 CAPSULE ORAL DAILY
Qty: 30 CAPSULE | Refills: 11 | Status: SHIPPED | OUTPATIENT
Start: 2018-11-07 | End: 2018-12-04

## 2018-11-07 NOTE — PROGRESS NOTES
Subjective:       Patient ID: Slime Bradshaw is a 38 y.o. male.    Chief Complaint: Anxiety; Follow-up; and Medication Problem (Side effects form meds. patient has d/c meds. )    Patient presents for his mood. He has been off his wellbutrin due to sexual side effects. He is anxious, decreased patience, and bahena off of his medication. He is easier to deal with when he is on his medication. He is calmer and can handle the situation better.       No past medical history on file.   Past Surgical History:  No date: KNEE ARTHROSCOPY W/ ACL RECONSTRUCTION      Comment:  right  No date: WISDOM TOOTH EXTRACTION  Review of patient's family history indicates:  Problem: Cancer      Relation: Father          Age of Onset: (Not Specified)          Comment: throat-- smoker  Problem: Hypertension      Relation: Mother          Age of Onset: (Not Specified)  Problem: Diabetes      Relation: Mother          Age of Onset: (Not Specified)  Problem: Hypertension      Relation: Brother          Age of Onset: (Not Specified)    Social History    Socioeconomic History      Marital status:       Spouse name: Not on file      Number of children: Not on file      Years of education: Not on file      Highest education level: Not on file    Social Needs      Financial resource strain: Not on file      Food insecurity - worry: Not on file      Food insecurity - inability: Not on file      Transportation needs - medical: Not on file      Transportation needs - non-medical: Not on file    Occupational History      Not on file    Tobacco Use      Smoking status: Never Smoker    Substance and Sexual Activity      Alcohol use: Yes        Alcohol/week: 0.0 - 1.0 oz      Drug use: No      Sexual activity: Yes        Partners: Female    Other Topics      Concerns:        Not on file    Social History Narrative      Works as a  for [employer]          Review of Systems    Objective:       Vitals:    11/07/18 1035   BP:  "126/82   Pulse: 64   Temp: 98.4 °F (36.9 °C)   TempSrc: Oral   SpO2: 96%   Weight: 103.3 kg (227 lb 11.8 oz)   Height: 6' 2" (1.88 m)       Physical Exam   Constitutional: He is oriented to person, place, and time. He appears well-developed and well-nourished. No distress.   HENT:   Head: Normocephalic and atraumatic.   Eyes: Conjunctivae are normal.   Neck: Normal range of motion. Neck supple.   Cardiovascular: Normal rate, regular rhythm and normal heart sounds. Exam reveals no gallop and no friction rub.   No murmur heard.  Pulmonary/Chest: Effort normal and breath sounds normal. No stridor. No respiratory distress. He has no wheezes. He has no rales.   Neurological: He is alert and oriented to person, place, and time.   Skin: He is not diaphoretic.   Psychiatric: He has a normal mood and affect.       Assessment:       1. Acute stress reaction        Plan:       Slime was seen today for anxiety, follow-up and medication problem.    Diagnoses and all orders for this visit:    Acute stress reaction  -     FLUoxetine (PROZAC) 20 MG capsule; Take 1 capsule (20 mg total) by mouth once daily.    patient is off his Wellbutrin. Will start Prozac daily. Follow-up in about 1 month (around 12/7/2018).         "

## 2018-12-04 ENCOUNTER — OFFICE VISIT (OUTPATIENT)
Dept: FAMILY MEDICINE | Facility: CLINIC | Age: 38
End: 2018-12-04
Payer: MEDICAID

## 2018-12-04 VITALS
HEIGHT: 74 IN | OXYGEN SATURATION: 98 % | SYSTOLIC BLOOD PRESSURE: 148 MMHG | DIASTOLIC BLOOD PRESSURE: 90 MMHG | HEART RATE: 77 BPM | BODY MASS INDEX: 28.86 KG/M2 | WEIGHT: 224.88 LBS

## 2018-12-04 DIAGNOSIS — F39 MOOD DISORDER: Primary | ICD-10-CM

## 2018-12-04 PROCEDURE — 99214 OFFICE O/P EST MOD 30 MIN: CPT | Mod: S$PBB,,, | Performed by: FAMILY MEDICINE

## 2018-12-04 PROCEDURE — 99213 OFFICE O/P EST LOW 20 MIN: CPT | Mod: PBBFAC,PO | Performed by: FAMILY MEDICINE

## 2018-12-04 PROCEDURE — 99999 PR PBB SHADOW E&M-EST. PATIENT-LVL III: CPT | Mod: PBBFAC,,, | Performed by: FAMILY MEDICINE

## 2018-12-04 RX ORDER — FLUOXETINE HYDROCHLORIDE 40 MG/1
40 CAPSULE ORAL DAILY
Qty: 30 CAPSULE | Refills: 11 | Status: SHIPPED | OUTPATIENT
Start: 2018-12-04 | End: 2019-01-17

## 2018-12-04 NOTE — PROGRESS NOTES
"Subjective:       Patient ID: Slime Bradshaw is a 38 y.o. male.    Chief Complaint: Follow Up/ Medications    38 year old male here for follow-up. He states he takes Prozac in the morning and feels like it is wearing off in the evening. He states it wears off at the end of the day. He states during the day he feels he is much better. His wife feels he is agitated and feels more clammy. They are having some issues with anxiety.       Review of Systems    Objective:       Vitals:    12/04/18 1032   BP: (!) 148/90   Pulse: 77   SpO2: 98%   Weight: 102 kg (224 lb 13.9 oz)   Height: 6' 2" (1.88 m)       Physical Exam   Constitutional: He is oriented to person, place, and time. He appears well-developed and well-nourished. No distress.   HENT:   Head: Normocephalic and atraumatic.   Cardiovascular: Normal rate, regular rhythm and normal heart sounds. Exam reveals no gallop and no friction rub.   No murmur heard.  Neurological: He is alert and oriented to person, place, and time.   Skin: He is not diaphoretic.       Assessment:       1. Mood disorder        Plan:       Slime was seen today for follow up/ medications.    Diagnoses and all orders for this visit:    Mood disorder  -     FLUoxetine (PROZAC) 40 MG capsule; Take 1 capsule (40 mg total) by mouth once daily.         increase prozac from 20 to 40 mg daily.    "

## 2019-01-17 ENCOUNTER — OFFICE VISIT (OUTPATIENT)
Dept: FAMILY MEDICINE | Facility: CLINIC | Age: 39
End: 2019-01-17
Payer: MEDICAID

## 2019-01-17 VITALS
DIASTOLIC BLOOD PRESSURE: 80 MMHG | HEIGHT: 74 IN | SYSTOLIC BLOOD PRESSURE: 140 MMHG | BODY MASS INDEX: 29.42 KG/M2 | WEIGHT: 229.25 LBS | HEART RATE: 69 BPM | OXYGEN SATURATION: 97 %

## 2019-01-17 DIAGNOSIS — F41.1 GAD (GENERALIZED ANXIETY DISORDER): Primary | ICD-10-CM

## 2019-01-17 DIAGNOSIS — G47.00 INSOMNIA, UNSPECIFIED TYPE: ICD-10-CM

## 2019-01-17 PROCEDURE — 99999 PR PBB SHADOW E&M-EST. PATIENT-LVL III: CPT | Mod: PBBFAC,,, | Performed by: FAMILY MEDICINE

## 2019-01-17 PROCEDURE — 99214 OFFICE O/P EST MOD 30 MIN: CPT | Mod: S$PBB,,, | Performed by: FAMILY MEDICINE

## 2019-01-17 PROCEDURE — 99999 PR PBB SHADOW E&M-EST. PATIENT-LVL III: ICD-10-PCS | Mod: PBBFAC,,, | Performed by: FAMILY MEDICINE

## 2019-01-17 PROCEDURE — 99213 OFFICE O/P EST LOW 20 MIN: CPT | Mod: PBBFAC,PO | Performed by: FAMILY MEDICINE

## 2019-01-17 PROCEDURE — 99214 PR OFFICE/OUTPT VISIT, EST, LEVL IV, 30-39 MIN: ICD-10-PCS | Mod: S$PBB,,, | Performed by: FAMILY MEDICINE

## 2019-01-17 RX ORDER — FLUOXETINE HYDROCHLORIDE 20 MG/1
20 CAPSULE ORAL DAILY
Qty: 30 CAPSULE | Refills: 11 | Status: SHIPPED | OUTPATIENT
Start: 2019-01-17 | End: 2019-07-30

## 2019-01-17 RX ORDER — ZOLPIDEM TARTRATE 10 MG/1
10 TABLET ORAL NIGHTLY PRN
Qty: 30 TABLET | Refills: 5 | Status: SHIPPED | OUTPATIENT
Start: 2019-01-17 | End: 2019-10-17 | Stop reason: SDUPTHER

## 2019-01-17 RX ORDER — TIZANIDINE 2 MG/1
TABLET ORAL
COMMUNITY
Start: 2018-12-07 | End: 2019-01-17

## 2019-01-17 NOTE — PROGRESS NOTES
"Subjective:       Patient ID: Slime Bradshaw is a 38 y.o. male.    Chief Complaint: Follow up/ Anxiety    38 year old male with anxiety and depression. He states his sister passed away on the 1st of January. He states he was having chest tightness and feels shaky. He feels a difference with the Prozac. He states he feels more irritated when he is not on it. He states he feels shaky at night. He states he states he still feels on edge. He states he didn't have any depressive symptoms until the death of his sister. He is not sleeping and has been fidgety.       Review of Systems    Objective:       Vitals:    01/17/19 1345   BP: (!) 140/80   Pulse: 69   SpO2: 97%   Weight: 104 kg (229 lb 4.5 oz)   Height: 6' 2" (1.88 m)       Physical Exam   Constitutional: He is oriented to person, place, and time. He appears well-developed and well-nourished. No distress.   HENT:   Head: Normocephalic and atraumatic.   Neurological: He is alert and oriented to person, place, and time.   Skin: He is not diaphoretic.   Psychiatric: His speech is normal and behavior is normal. Judgment and thought content normal. His mood appears anxious. His affect is not angry, not blunt and not labile. Cognition and memory are normal. He does not exhibit a depressed mood.       Assessment:       1. SHARON (generalized anxiety disorder)    2. Insomnia, unspecified type        Plan:       Slime was seen today for follow up/ anxiety.    Diagnoses and all orders for this visit:    SHARON (generalized anxiety disorder)  -     FLUoxetine 20 MG capsule; Take 1 capsule (20 mg total) by mouth once daily.  Adding 20 mg for a total of 60 mg daily.    Insomnia, unspecified type  -     zolpidem (AMBIEN) 10 mg Tab; Take 1 tablet (10 mg total) by mouth nightly as needed.  Increase from 5 to 10 mg of ambien.          "

## 2019-01-24 ENCOUNTER — OFFICE VISIT (OUTPATIENT)
Dept: PHYSICAL MEDICINE AND REHAB | Facility: CLINIC | Age: 39
End: 2019-01-24
Payer: MEDICAID

## 2019-01-24 VITALS
HEART RATE: 60 BPM | WEIGHT: 230.81 LBS | HEIGHT: 74 IN | SYSTOLIC BLOOD PRESSURE: 133 MMHG | DIASTOLIC BLOOD PRESSURE: 87 MMHG | BODY MASS INDEX: 29.62 KG/M2

## 2019-01-24 DIAGNOSIS — M47.817 FACET ARTHRITIS OF LUMBOSACRAL REGION: Primary | ICD-10-CM

## 2019-01-24 DIAGNOSIS — M53.3 SACROILIAC JOINT DYSFUNCTION OF LEFT SIDE: ICD-10-CM

## 2019-01-24 DIAGNOSIS — M54.50 LUMBOSACRAL PAIN, CHRONIC: ICD-10-CM

## 2019-01-24 DIAGNOSIS — G89.29 CHRONIC LEFT-SIDED LOW BACK PAIN WITHOUT SCIATICA: ICD-10-CM

## 2019-01-24 DIAGNOSIS — M54.50 CHRONIC LEFT-SIDED LOW BACK PAIN WITHOUT SCIATICA: ICD-10-CM

## 2019-01-24 DIAGNOSIS — M53.3 SI (SACROILIAC) JOINT DYSFUNCTION: ICD-10-CM

## 2019-01-24 DIAGNOSIS — G89.29 LUMBOSACRAL PAIN, CHRONIC: ICD-10-CM

## 2019-01-24 PROCEDURE — 99214 PR OFFICE/OUTPT VISIT, EST, LEVL IV, 30-39 MIN: ICD-10-PCS | Mod: S$PBB,,, | Performed by: PHYSICAL MEDICINE & REHABILITATION

## 2019-01-24 PROCEDURE — 99999 PR PBB SHADOW E&M-EST. PATIENT-LVL III: ICD-10-PCS | Mod: PBBFAC,,, | Performed by: PHYSICAL MEDICINE & REHABILITATION

## 2019-01-24 PROCEDURE — 99999 PR PBB SHADOW E&M-EST. PATIENT-LVL III: CPT | Mod: PBBFAC,,, | Performed by: PHYSICAL MEDICINE & REHABILITATION

## 2019-01-24 PROCEDURE — 99214 OFFICE O/P EST MOD 30 MIN: CPT | Mod: S$PBB,,, | Performed by: PHYSICAL MEDICINE & REHABILITATION

## 2019-01-24 PROCEDURE — 99213 OFFICE O/P EST LOW 20 MIN: CPT | Mod: PBBFAC | Performed by: PHYSICAL MEDICINE & REHABILITATION

## 2019-01-24 RX ORDER — TRAMADOL HYDROCHLORIDE 50 MG/1
50 TABLET ORAL EVERY 8 HOURS PRN
Qty: 90 TABLET | Refills: 0 | Status: SHIPPED | OUTPATIENT
Start: 2019-01-24 | End: 2019-02-23

## 2019-01-24 RX ORDER — TRAMADOL HYDROCHLORIDE 50 MG/1
50 TABLET ORAL EVERY 8 HOURS PRN
Qty: 90 TABLET | Refills: 0 | Status: SHIPPED | OUTPATIENT
Start: 2019-03-24 | End: 2019-06-06 | Stop reason: SDUPTHER

## 2019-01-24 RX ORDER — TRAMADOL HYDROCHLORIDE 50 MG/1
50 TABLET ORAL EVERY 8 HOURS PRN
Qty: 90 TABLET | Refills: 0 | Status: SHIPPED | OUTPATIENT
Start: 2019-02-24 | End: 2019-03-26

## 2019-01-24 NOTE — PROGRESS NOTES
"Subjective:       Patient ID: Slime Bradshaw is a 38 y.o. male.    Chief Complaint: Back Pain    Back Pain   Pertinent negatives include no chest pain, dysuria, headaches, numbness or weakness.      Slime Ledezma is 39 y/o male who returns  to clinic for chronic back pain.   LCV 09/24/19.  Since LCV, his pain is unchanged, since he was not able to get GIANNA. His insurance would not approve ES    Today, he still c/oBoth  siides back pain, he states It alternates.  Left sided back pain improved after GIANNA, although he still gets muscle spasms.   but now, he c/o more about  Rt sided back pain, similar to pain on left side he had before GIANNA.  He received Lt L5-S1 TF GIANNA on 5/21/18, by IR, and that injection helped > 50% and is still lasting. He was able to return to gym, and now having RT sided back pain, he would like to get GIANNA to his RT side.       Back Pain Description:  Length: pain is chronic pain. Length >  15 -16 Year(s),.   He started experiencing low back pain in his 20's, while in  service, and   now he is asking for a disability with [employer].   He states that he at that time , he reported for the same pain.   He was later in college basket ball with multiple falls, he is now doing  gym, and weight , can bench press 250 lbs, lying down, can do free style ~ 100 lbs.   Multiple  past, recent injury, falls.  Intensity:  CURRENT   3/10,  AVERAGE  Pain   3/10. at BEST   1-2/10 , At WORST   8/10 on the WORST day.   Location: pain is localized at Rt side of back.   It is only  Back pain and no leg pain.  Radiation: Positive to buttocks. Negative to legs.   Timing : constant , + morning pain, w/o stiffness ,he stated that "as long as I walk I have no problem, but I walk too long or sit too long I have a problem".   QUALITY:  Dull pain with sharp pain with  m movement,   NO neuropathic : burning, tingling, numbness  Negative leg weakness.   Worsening factors: prolong sitting,. Being in same position, " leaning toward left side and sitting feel better   Alleviating factors: lying down has a pain  Symptoms interfere with daily activity, sleeping and work.   Current medications: Steroids, Robaxin. Sometimes in past Zanaflex help, Flexeril   Makes him too sleepy.;   Failed medications:  Ibuprofen 800 mg, Gabapentin 100 mg , 1+1+3, Tramadol, prn.  Prior procedures. He had repair ofTorn ACL in Rt knee while playing basket ball.   PT/OT: none  Patient denies night fever/night sweats, bowel incontinence, significant weight loss and significant motor weakness ( red flags).  Patient denies any suicidal or homicidal ideations.   He is here for follow up, and treatment.    No past medical history on file.    Past Surgical History:   Procedure Laterality Date    KNEE ARTHROSCOPY W/ ACL RECONSTRUCTION      right    WISDOM TOOTH EXTRACTION         Family History   Problem Relation Age of Onset    Cancer Father         throat-- smoker    Hypertension Mother     Diabetes Mother     Hypertension Brother        Social History     Socioeconomic History    Marital status:      Spouse name: None    Number of children: None    Years of education: None    Highest education level: None   Social Needs    Financial resource strain: None    Food insecurity - worry: None    Food insecurity - inability: None    Transportation needs - medical: None    Transportation needs - non-medical: None   Occupational History    None   Tobacco Use    Smoking status: Never Smoker   Substance and Sexual Activity    Alcohol use: Yes     Alcohol/week: 0.0 - 1.0 oz    Drug use: No    Sexual activity: Yes     Partners: Female   Other Topics Concern    None   Social History Narrative    Works as a  for [employer]       Current Outpatient Medications   Medication Sig Dispense Refill    FLUoxetine 20 MG capsule Take 1 capsule (20 mg total) by mouth once daily. 30 capsule 11    traMADol (ULTRAM) 50 mg tablet Take 1  tablet (50 mg total) by mouth every 8 (eight) hours as needed for Pain. 90 tablet 0    [START ON 2/24/2019] traMADol (ULTRAM) 50 mg tablet Take 1 tablet (50 mg total) by mouth every 8 (eight) hours as needed for Pain. 90 tablet 0    [START ON 3/24/2019] traMADol (ULTRAM) 50 mg tablet Take 1 tablet (50 mg total) by mouth every 8 (eight) hours as needed for Pain. 90 tablet 0    zolpidem (AMBIEN) 10 mg Tab Take 1 tablet (10 mg total) by mouth nightly as needed. 30 tablet 5     No current facility-administered medications for this visit.        Review of patient's allergies indicates:   Allergen Reactions    Hydrocodone Itching     Review of Systems   Constitutional: Negative for appetite change and fatigue.   Eyes: Negative for visual disturbance.   Respiratory: Negative for shortness of breath.    Cardiovascular: Negative for chest pain.   Gastrointestinal: Negative for constipation and diarrhea.   Genitourinary: Negative for dysuria, frequency and urgency.   Musculoskeletal: Positive for back pain. Negative for arthralgias, gait problem, joint swelling, myalgias, neck pain and neck stiffness.   Neurological: Negative for dizziness, tremors, weakness, numbness and headaches.   Psychiatric/Behavioral: Negative for dysphoric mood.   All other systems reviewed and are negative.        Objective:      Physical Exam    GENERAL: The patient is alert, oriented, pleasant.  HEENT: PERRLA  NECK: supple, no masses,    CV: S1S2, RRR, no murmurs, rales.  LUNGS: CTA bilateral.   ABDOMEN: soft, non-tender, non distended, bowel sounds nl.  EXTREMITIES: no edema, +2 pulses DP, b/l  SKIN: no skin rash, no skin breakdowns.  MUSCULOSKELETAL:   Gait : normal, walks with no AD.  Walks on toes/heels w/o difficulties.  Cervical spine: full AROM in cervical spine   Lumbar spine, decreased active range of motion in all planes, flexion to 90 degrees, with pain on extention, that is limited to 0.  Side bending and rotation to 35-40 degrees,  b/l, with pain on left side bellow the waist line.  Positive facet loading on left.   Positive Tomas sign on Lt.  Straight leg raising Negative bilaterally.   Minimal movement of Left SI joint compare to Right.  Full range of motion in all joints x4 extremities.   Muscle strength 5/5 throughout x4 extremities.   No  joint laxity throughout x4 extremities.   NEUROLOGIC: Cranial nerves II through XII intact.   Deep tendon reflexes is normal, +2 in the upper and lower extremities bilaterally.   Muscle tone is normal.   Sensory is intact to light touch and pinprick throughout x4 extremities.     MRI of Lumbar spine ( 2/16/18) showed:  Lumbar spine alignment is within normal limits. The vertebral body heights are well maintained, with no fracture.  No marrow signal abnormality suspicious for an infiltrative process.  Incidental note is made of a focal area of sclerosis adjacent to the left sacroiliac joint likely representing enostosis.    There are findings of multi-level lumbar spondylosis, as below.  L1-L2: There is no focal disc herniation. No significant spinal canal or neural foraminal narrowing.  L2-L3: There is no focal disc herniation. No significant spinal canal or neural foraminal narrowing.  L3-L4: There is no focal disc herniation. No significant spinal canal or neural foraminal narrowing.  L4-L5: There is no focal disc herniation. No significant spinal canal or neural foraminal narrowing.  L5-S1: Moderate bilateral facet arthropathy without significant spinal canal or neural foraminal narrowing.  The conus is normal in appearance, and terminates at the L1-L2 level.   The adjacent soft tissue structures show no significant abnormalities.  Impression:  Bilateral facet arthropathy at L5-S1 without significant spinal canal stenosis or neural foraminal narrowing.      Assessment:       1. Facet arthritis of lumbosacral region    2. Lumbosacral pain, chronic    3. Chronic left-sided low back pain without  sciatica    4. SI (sacroiliac) joint dysfunction    5. Sacroiliac joint dysfunction of left side        Plan:       Facet arthritis of lumbosacral region  -     traMADol (ULTRAM) 50 mg tablet; Take 1 tablet (50 mg total) by mouth every 8 (eight) hours as needed for Pain.  Dispense: 90 tablet; Refill: 0  -     traMADol (ULTRAM) 50 mg tablet; Take 1 tablet (50 mg total) by mouth every 8 (eight) hours as needed for Pain.  Dispense: 90 tablet; Refill: 0  -     traMADol (ULTRAM) 50 mg tablet; Take 1 tablet (50 mg total) by mouth every 8 (eight) hours as needed for Pain.  Dispense: 90 tablet; Refill: 0    Lumbosacral pain, chronic  -     traMADol (ULTRAM) 50 mg tablet; Take 1 tablet (50 mg total) by mouth every 8 (eight) hours as needed for Pain.  Dispense: 90 tablet; Refill: 0  -     traMADol (ULTRAM) 50 mg tablet; Take 1 tablet (50 mg total) by mouth every 8 (eight) hours as needed for Pain.  Dispense: 90 tablet; Refill: 0  -     traMADol (ULTRAM) 50 mg tablet; Take 1 tablet (50 mg total) by mouth every 8 (eight) hours as needed for Pain.  Dispense: 90 tablet; Refill: 0    Chronic left-sided low back pain without sciatica  -     traMADol (ULTRAM) 50 mg tablet; Take 1 tablet (50 mg total) by mouth every 8 (eight) hours as needed for Pain.  Dispense: 90 tablet; Refill: 0  -     traMADol (ULTRAM) 50 mg tablet; Take 1 tablet (50 mg total) by mouth every 8 (eight) hours as needed for Pain.  Dispense: 90 tablet; Refill: 0  -     traMADol (ULTRAM) 50 mg tablet; Take 1 tablet (50 mg total) by mouth every 8 (eight) hours as needed for Pain.  Dispense: 90 tablet; Refill: 0    SI (sacroiliac) joint dysfunction  -     traMADol (ULTRAM) 50 mg tablet; Take 1 tablet (50 mg total) by mouth every 8 (eight) hours as needed for Pain.  Dispense: 90 tablet; Refill: 0  -     traMADol (ULTRAM) 50 mg tablet; Take 1 tablet (50 mg total) by mouth every 8 (eight) hours as needed for Pain.  Dispense: 90 tablet; Refill: 0  -     traMADol (ULTRAM)  50 mg tablet; Take 1 tablet (50 mg total) by mouth every 8 (eight) hours as needed for Pain.  Dispense: 90 tablet; Refill: 0    Sacroiliac joint dysfunction of left side  -     traMADol (ULTRAM) 50 mg tablet; Take 1 tablet (50 mg total) by mouth every 8 (eight) hours as needed for Pain.  Dispense: 90 tablet; Refill: 0  -     traMADol (ULTRAM) 50 mg tablet; Take 1 tablet (50 mg total) by mouth every 8 (eight) hours as needed for Pain.  Dispense: 90 tablet; Refill: 0  -     traMADol (ULTRAM) 50 mg tablet; Take 1 tablet (50 mg total) by mouth every 8 (eight) hours as needed for Pain.  Dispense: 90 tablet; Refill: 0    Patient with worsening of chronic low back pain, he complains about very specific spot now on Rt side of his back, that correlates with dx. Of Facet arthropathy.  Bilateral facet arthropathy at L5-S1 without significant spinal canal stenosis or neural foraminal narrowing.      -- Dx.   MRI of Lumbar spine results discussed with pt, again.    -- Pain management:  Completed  PT, that did not help much.  Will resume NSAIDs, and  Tramadol.prn.    Opioid Risk Score     None         reviewed and appropriate.  Tramadol refills on  12/07, 10/27, 09/24/18.    RTC in 6 months.     Total time spent face to face with patient was 25 minutes.   More than 50% of that time was spent in counseling on diagnosis , prognosis and treatment options.   I also  patient  on common and most usual side effect of prescribed medications.    I reviewed Primary care , and other specialty's notes to better coordinate patient's  care.   All questions were answered, and patient voiced understanding.

## 2019-06-05 ENCOUNTER — TELEPHONE (OUTPATIENT)
Dept: FAMILY MEDICINE | Facility: CLINIC | Age: 39
End: 2019-06-05

## 2019-06-05 NOTE — TELEPHONE ENCOUNTER
----- Message from Prisca Perez sent at 6/5/2019 12:06 PM CDT -----  Contact: [place]   [phone]  Type: Patient Call Back    Who called: wife  Liliane     What is the request in detail: Pt is having back pain requesting to speak to you about that    Best call back number: [phone]  Thanks......German

## 2019-06-05 NOTE — TELEPHONE ENCOUNTER
[place]/patient's wife requesting to schedule an office visit for patient for back pain.  Advised patient's wife that next available appointment is 7/30/2019@7:45am.  Patient's wife scheduled appointment.

## 2019-06-06 DIAGNOSIS — M53.3 SACROILIAC JOINT DYSFUNCTION OF LEFT SIDE: ICD-10-CM

## 2019-06-06 DIAGNOSIS — M47.817 FACET ARTHRITIS OF LUMBOSACRAL REGION: ICD-10-CM

## 2019-06-06 DIAGNOSIS — M54.50 CHRONIC LEFT-SIDED LOW BACK PAIN WITHOUT SCIATICA: ICD-10-CM

## 2019-06-06 DIAGNOSIS — M53.3 SI (SACROILIAC) JOINT DYSFUNCTION: ICD-10-CM

## 2019-06-06 DIAGNOSIS — G89.29 LUMBOSACRAL PAIN, CHRONIC: ICD-10-CM

## 2019-06-06 DIAGNOSIS — G89.29 CHRONIC LEFT-SIDED LOW BACK PAIN WITHOUT SCIATICA: ICD-10-CM

## 2019-06-06 DIAGNOSIS — M54.50 LUMBOSACRAL PAIN, CHRONIC: ICD-10-CM

## 2019-06-06 NOTE — TELEPHONE ENCOUNTER
Pharmacy refill request  Last visit: 01/24/2019  Next visit: 7/15/2019  Last refill Tramadol: 03/24/2019

## 2019-06-11 ENCOUNTER — TELEPHONE (OUTPATIENT)
Dept: FAMILY MEDICINE | Facility: CLINIC | Age: 39
End: 2019-06-11

## 2019-06-11 RX ORDER — TRAMADOL HYDROCHLORIDE 50 MG/1
50 TABLET ORAL EVERY 8 HOURS PRN
Qty: 90 TABLET | Refills: 0 | Status: SHIPPED | OUTPATIENT
Start: 2019-06-11 | End: 2019-07-15 | Stop reason: SDUPTHER

## 2019-06-11 NOTE — TELEPHONE ENCOUNTER
----- Message from Benoit Cool sent at 6/11/2019  4:29 PM CDT -----  Contact: pt   ..Type:  Patient Returning Call    Who Called: pt     Who Left Message for Patient: none    Does the patient know what this is regarding? No     Would the patient rather a call back or a response via [facility]? Call     Best Call Back Number:[phone]    Additional Information:

## 2019-06-11 NOTE — TELEPHONE ENCOUNTER
Call placed to Pt, no answer. Call placed to Pt's wife, and message left with her to have Pt call the office.

## 2019-07-15 ENCOUNTER — OFFICE VISIT (OUTPATIENT)
Dept: PHYSICAL MEDICINE AND REHAB | Facility: CLINIC | Age: 39
End: 2019-07-15
Payer: MEDICAID

## 2019-07-15 VITALS
WEIGHT: 231.38 LBS | HEART RATE: 58 BPM | SYSTOLIC BLOOD PRESSURE: 133 MMHG | BODY MASS INDEX: 29.69 KG/M2 | DIASTOLIC BLOOD PRESSURE: 78 MMHG | HEIGHT: 74 IN

## 2019-07-15 DIAGNOSIS — M53.3 SI (SACROILIAC) JOINT DYSFUNCTION: ICD-10-CM

## 2019-07-15 DIAGNOSIS — G89.29 CHRONIC LEFT-SIDED LOW BACK PAIN WITHOUT SCIATICA: ICD-10-CM

## 2019-07-15 DIAGNOSIS — M54.50 LUMBOSACRAL PAIN, CHRONIC: Primary | ICD-10-CM

## 2019-07-15 DIAGNOSIS — M54.50 CHRONIC LEFT-SIDED LOW BACK PAIN WITHOUT SCIATICA: ICD-10-CM

## 2019-07-15 DIAGNOSIS — M53.3 SACROILIAC JOINT DYSFUNCTION OF LEFT SIDE: ICD-10-CM

## 2019-07-15 DIAGNOSIS — M47.817 FACET ARTHRITIS OF LUMBOSACRAL REGION: ICD-10-CM

## 2019-07-15 DIAGNOSIS — G89.29 LUMBOSACRAL PAIN, CHRONIC: Primary | ICD-10-CM

## 2019-07-15 PROCEDURE — 99214 PR OFFICE/OUTPT VISIT, EST, LEVL IV, 30-39 MIN: ICD-10-PCS | Mod: S$PBB,,, | Performed by: PHYSICAL MEDICINE & REHABILITATION

## 2019-07-15 PROCEDURE — 99213 OFFICE O/P EST LOW 20 MIN: CPT | Mod: PBBFAC | Performed by: PHYSICAL MEDICINE & REHABILITATION

## 2019-07-15 PROCEDURE — 99214 OFFICE O/P EST MOD 30 MIN: CPT | Mod: S$PBB,,, | Performed by: PHYSICAL MEDICINE & REHABILITATION

## 2019-07-15 PROCEDURE — 99999 PR PBB SHADOW E&M-EST. PATIENT-LVL III: ICD-10-PCS | Mod: PBBFAC,,, | Performed by: PHYSICAL MEDICINE & REHABILITATION

## 2019-07-15 PROCEDURE — 99999 PR PBB SHADOW E&M-EST. PATIENT-LVL III: CPT | Mod: PBBFAC,,, | Performed by: PHYSICAL MEDICINE & REHABILITATION

## 2019-07-15 RX ORDER — TRAMADOL HYDROCHLORIDE 50 MG/1
50 TABLET ORAL EVERY 8 HOURS PRN
Qty: 90 TABLET | Refills: 3 | Status: SHIPPED | OUTPATIENT
Start: 2019-07-15 | End: 2019-07-30 | Stop reason: SDUPTHER

## 2019-07-15 NOTE — PROGRESS NOTES
"Subjective:       Patient ID: Slime Bradshaw is a 38 y.o. male.    Chief Complaint: Back Pain    Back Pain   Pertinent negatives include no chest pain, dysuria, headaches, numbness or weakness.      Slime Ledezma is 37 y/o male who returns  to clinic for chronic back pain.   LCV 01/24/19.     Since LCV, his pain is unchanged, since he was not able to get GIANNA. His insurance would not approve ES  Today, he still c/o Both  siides back pain, he states It alternates.  Left sided back pain improved after GIANNA, although he still gets muscle spasms.   but now, he c/o more about  Rt sided back pain, similar to pain on left side he had before GIANNA.  He received Lt L5-S1 TF GIANNA on 5/21/18, by IR, and that injection helped > 50% and is still lasting. He was able to return to gym, and now having RT sided back pain, he would like to get GIANNA to his RT side.       Back Pain Description:  Length: pain is chronic pain. Length >  15 -16 Year(s),.   He started experiencing low back pain in his 20's, while in  service, and   now he is asking for a disability with [employer].   He states that he at that time , he reported for the same pain.   He was later in college basket ball with multiple falls, he is now doing  gym, and weight , can bench press 250 lbs, lying down, can do free style ~ 100 lbs.   Multiple  past, recent injury, falls.  Intensity:  CURRENT   3/10,  AVERAGE  Pain   3/10. at BEST   1-2/10 , At WORST   8/10 on the WORST day.   Location: pain is localized at Rt side of back.   It is only  Back pain and no leg pain.  Radiation: Positive to buttocks. Negative to legs.   Timing : constant , + morning pain, w/o stiffness ,he stated that "as long as I walk I have no problem, but I walk too long or sit too long I have a problem".   QUALITY:  Dull pain with sharp pain with  m movement,   NO neuropathic : burning, tingling, numbness  Negative leg weakness.   Worsening factors: prolong sitting,. Being in same position, " leaning toward left side and sitting feel better   Alleviating factors: lying down has a pain  Symptoms interfere with daily activity, sleeping and work.   Current medications: Steroids, Robaxin. Sometimes in past Zanaflex help, Flexeril   Makes him too sleepy.;   Failed medications:  Ibuprofen 800 mg, Gabapentin 100 mg , 1+1+3, Tramadol, prn.  Prior procedures. He had repair ofTorn ACL in Rt knee while playing basket ball.   PT/OT: none  Patient denies night fever/night sweats, bowel incontinence, significant weight loss and significant motor weakness ( red flags).  Patient denies any suicidal or homicidal ideations.   He is here for follow up, and treatment.    No past medical history on file.    Past Surgical History:   Procedure Laterality Date    KNEE ARTHROSCOPY W/ ACL RECONSTRUCTION      right    WISDOM TOOTH EXTRACTION         Family History   Problem Relation Age of Onset    Cancer Father         throat-- smoker    Hypertension Mother     Diabetes Mother     Hypertension Brother        Social History     Socioeconomic History    Marital status:      Spouse name: Not on file    Number of children: Not on file    Years of education: Not on file    Highest education level: Not on file   Occupational History    Not on file   Social Needs    Financial resource strain: Not on file    Food insecurity:     Worry: Not on file     Inability: Not on file    Transportation needs:     Medical: Not on file     Non-medical: Not on file   Tobacco Use    Smoking status: Never Smoker   Substance and Sexual Activity    Alcohol use: Yes     Alcohol/week: 0.0 - 1.0 oz    Drug use: No    Sexual activity: Yes     Partners: Female   Lifestyle    Physical activity:     Days per week: Not on file     Minutes per session: Not on file    Stress: Not on file   Relationships    Social connections:     Talks on phone: Not on file     Gets together: Not on file     Attends [employer] service: Not on file      Active member of club or organization: Not on file     Attends meetings of clubs or organizations: Not on file     Relationship status: Not on file   Other Topics Concern    Not on file   Social History Narrative    Works as a  for [employer]       Current Outpatient Medications   Medication Sig Dispense Refill    FLUoxetine 20 MG capsule Take 1 capsule (20 mg total) by mouth once daily. 30 capsule 11    traMADol (ULTRAM) 50 mg tablet Take 1 tablet (50 mg total) by mouth every 8 (eight) hours as needed for Pain. 90 tablet 3    zolpidem (AMBIEN) 10 mg Tab Take 1 tablet (10 mg total) by mouth nightly as needed. 30 tablet 5     No current facility-administered medications for this visit.        Review of patient's allergies indicates:   Allergen Reactions    Hydrocodone Itching     Review of Systems   Constitutional: Negative for appetite change and fatigue.   Eyes: Negative for visual disturbance.   Respiratory: Negative for shortness of breath.    Cardiovascular: Negative for chest pain.   Gastrointestinal: Negative for constipation and diarrhea.   Genitourinary: Negative for dysuria, frequency and urgency.   Musculoskeletal: Positive for back pain. Negative for arthralgias, gait problem, joint swelling, myalgias, neck pain and neck stiffness.   Neurological: Negative for dizziness, tremors, weakness, numbness and headaches.   Psychiatric/Behavioral: Negative for dysphoric mood.   All other systems reviewed and are negative.        Objective:      Physical Exam    GENERAL: The patient is alert, oriented, pleasant.  HEENT: PERRLA  NECK: supple, no masses,    CV: S1S2, RRR, no murmurs, rales.  LUNGS: CTA bilateral.   ABDOMEN: soft, non-tender, non distended, bowel sounds nl.  EXTREMITIES: no edema, +2 pulses DP, b/l  SKIN: no skin rash, no skin breakdowns.  MUSCULOSKELETAL:   Gait : normal, walks with no AD.  Walks on toes/heels w/o difficulties.  Cervical spine: full AROM in cervical spine    Lumbar spine, decreased active range of motion in all planes, flexion to 90 degrees, with pain on extention, that is limited to 0.  Side bending and rotation to 35-40 degrees, b/l, with pain on left side bellow the waist line.  Positive facet loading on left.   Positive Tomas sign on Lt.  Straight leg raising Negative bilaterally.   Minimal movement of Left SI joint compare to Right.  Full range of motion in all joints x4 extremities.   Muscle strength 5/5 throughout x4 extremities.   No  joint laxity throughout x4 extremities.   NEUROLOGIC: Cranial nerves II through XII intact.   Deep tendon reflexes is normal, +2 in the upper and lower extremities bilaterally.   Muscle tone is normal.   Sensory is intact to light touch and pinprick throughout x4 extremities.     MRI of Lumbar spine ( 2/16/18) showed:  Lumbar spine alignment is within normal limits. The vertebral body heights are well maintained, with no fracture.  No marrow signal abnormality suspicious for an infiltrative process.  Incidental note is made of a focal area of sclerosis adjacent to the left sacroiliac joint likely representing enostosis.    There are findings of multi-level lumbar spondylosis, as below.  L1-L2: There is no focal disc herniation. No significant spinal canal or neural foraminal narrowing.  L2-L3: There is no focal disc herniation. No significant spinal canal or neural foraminal narrowing.  L3-L4: There is no focal disc herniation. No significant spinal canal or neural foraminal narrowing.  L4-L5: There is no focal disc herniation. No significant spinal canal or neural foraminal narrowing.  L5-S1: Moderate bilateral facet arthropathy without significant spinal canal or neural foraminal narrowing.  The conus is normal in appearance, and terminates at the L1-L2 level.   The adjacent soft tissue structures show no significant abnormalities.  Impression:  Bilateral facet arthropathy at L5-S1 without significant spinal canal stenosis or  neural foraminal narrowing.      Assessment:       1. Lumbosacral pain, chronic    2. Facet arthritis of lumbosacral region    3. Sacroiliac joint dysfunction of left side    4. Chronic left-sided low back pain without sciatica    5. SI (sacroiliac) joint dysfunction        Plan:       Lumbosacral pain, chronic  -     traMADol (ULTRAM) 50 mg tablet; Take 1 tablet (50 mg total) by mouth every 8 (eight) hours as needed for Pain.  Dispense: 90 tablet; Refill: 3    Facet arthritis of lumbosacral region  -     traMADol (ULTRAM) 50 mg tablet; Take 1 tablet (50 mg total) by mouth every 8 (eight) hours as needed for Pain.  Dispense: 90 tablet; Refill: 3    Sacroiliac joint dysfunction of left side  -     traMADol (ULTRAM) 50 mg tablet; Take 1 tablet (50 mg total) by mouth every 8 (eight) hours as needed for Pain.  Dispense: 90 tablet; Refill: 3    Chronic left-sided low back pain without sciatica  -     traMADol (ULTRAM) 50 mg tablet; Take 1 tablet (50 mg total) by mouth every 8 (eight) hours as needed for Pain.  Dispense: 90 tablet; Refill: 3    SI (sacroiliac) joint dysfunction  -     traMADol (ULTRAM) 50 mg tablet; Take 1 tablet (50 mg total) by mouth every 8 (eight) hours as needed for Pain.  Dispense: 90 tablet; Refill: 3    Patient with worsening of chronic low back pain, he complains about very specific spot now on Rt side of his back, that correlates with dx. Of Facet arthropathy.  Bilateral facet arthropathy at L5-S1 without significant spinal canal stenosis or neural foraminal narrowing.      -- Dx.   MRI of Lumbar spine results discussed with pt, again.    -- Pain management:  Completed  PT, that did not help much.  Will resume NSAIDs, and  Tramadol.prn.    Opioid Risk Score     None         reviewed and appropriate.  Tramadol refills on  7/5, 06/08, 4/22, 3/04,  12/07, 10/27, 09/24/18.  Pt is stable, and should be able to get his pain medication from PCP.   Will be discharged from clinic to PCP..    RTC  prn.    Total time spent face to face with patient was 25 minutes.   More than 50% of that time was spent in counseling on diagnosis , prognosis and treatment options.   I also  patient  on common and most usual side effect of prescribed medications.    I reviewed Primary care , and other specialty's notes to better coordinate patient's  care.   All questions were answered, and patient voiced understanding.

## 2019-07-16 ENCOUNTER — PATIENT OUTREACH (OUTPATIENT)
Dept: ADMINISTRATIVE | Facility: HOSPITAL | Age: 39
End: 2019-07-16

## 2019-07-16 DIAGNOSIS — Z13.220 SCREENING FOR HYPERLIPIDEMIA: Primary | ICD-10-CM

## 2019-07-30 ENCOUNTER — LAB VISIT (OUTPATIENT)
Dept: LAB | Facility: HOSPITAL | Age: 39
End: 2019-07-30
Attending: FAMILY MEDICINE
Payer: MEDICAID

## 2019-07-30 ENCOUNTER — OFFICE VISIT (OUTPATIENT)
Dept: FAMILY MEDICINE | Facility: CLINIC | Age: 39
End: 2019-07-30
Payer: MEDICAID

## 2019-07-30 VITALS
HEIGHT: 74 IN | DIASTOLIC BLOOD PRESSURE: 78 MMHG | TEMPERATURE: 99 F | HEART RATE: 72 BPM | WEIGHT: 227.06 LBS | SYSTOLIC BLOOD PRESSURE: 124 MMHG | BODY MASS INDEX: 29.14 KG/M2

## 2019-07-30 DIAGNOSIS — G89.29 LUMBOSACRAL PAIN, CHRONIC: Primary | ICD-10-CM

## 2019-07-30 DIAGNOSIS — M47.817 FACET ARTHRITIS OF LUMBOSACRAL REGION: ICD-10-CM

## 2019-07-30 DIAGNOSIS — F41.1 GAD (GENERALIZED ANXIETY DISORDER): ICD-10-CM

## 2019-07-30 DIAGNOSIS — M54.50 CHRONIC LEFT-SIDED LOW BACK PAIN WITHOUT SCIATICA: ICD-10-CM

## 2019-07-30 DIAGNOSIS — G89.29 CHRONIC LEFT-SIDED LOW BACK PAIN WITHOUT SCIATICA: ICD-10-CM

## 2019-07-30 DIAGNOSIS — M53.3 SACROILIAC JOINT DYSFUNCTION OF LEFT SIDE: ICD-10-CM

## 2019-07-30 DIAGNOSIS — M53.3 SI (SACROILIAC) JOINT DYSFUNCTION: ICD-10-CM

## 2019-07-30 DIAGNOSIS — M54.50 LUMBOSACRAL PAIN, CHRONIC: Primary | ICD-10-CM

## 2019-07-30 LAB
ALBUMIN SERPL BCP-MCNC: 4 G/DL (ref 3.5–5.2)
ALP SERPL-CCNC: 55 U/L (ref 55–135)
ALT SERPL W/O P-5'-P-CCNC: 36 U/L (ref 10–44)
ANION GAP SERPL CALC-SCNC: 8 MMOL/L (ref 8–16)
AST SERPL-CCNC: 24 U/L (ref 10–40)
BASOPHILS # BLD AUTO: 0.01 K/UL (ref 0–0.2)
BASOPHILS NFR BLD: 0.2 % (ref 0–1.9)
BILIRUB SERPL-MCNC: 0.7 MG/DL (ref 0.1–1)
BUN SERPL-MCNC: 18 MG/DL (ref 6–20)
CALCIUM SERPL-MCNC: 9.5 MG/DL (ref 8.7–10.5)
CHLORIDE SERPL-SCNC: 104 MMOL/L (ref 95–110)
CHOLEST SERPL-MCNC: 262 MG/DL (ref 120–199)
CHOLEST/HDLC SERPL: 5.7 {RATIO} (ref 2–5)
CO2 SERPL-SCNC: 27 MMOL/L (ref 23–29)
CREAT SERPL-MCNC: 1.3 MG/DL (ref 0.5–1.4)
DIFFERENTIAL METHOD: NORMAL
EOSINOPHIL # BLD AUTO: 0.2 K/UL (ref 0–0.5)
EOSINOPHIL NFR BLD: 5.6 % (ref 0–8)
ERYTHROCYTE [DISTWIDTH] IN BLOOD BY AUTOMATED COUNT: 12 % (ref 11.5–14.5)
EST. GFR  (AFRICAN AMERICAN): >60 ML/MIN/1.73 M^2
EST. GFR  (NON AFRICAN AMERICAN): >60 ML/MIN/1.73 M^2
GLUCOSE SERPL-MCNC: 104 MG/DL (ref 70–110)
HCT VFR BLD AUTO: 43.1 % (ref 40–54)
HDLC SERPL-MCNC: 46 MG/DL (ref 40–75)
HDLC SERPL: 17.6 % (ref 20–50)
HGB BLD-MCNC: 15 G/DL (ref 14–18)
LDLC SERPL CALC-MCNC: 204.6 MG/DL (ref 63–159)
LYMPHOCYTES # BLD AUTO: 1.6 K/UL (ref 1–4.8)
LYMPHOCYTES NFR BLD: 39 % (ref 18–48)
MCH RBC QN AUTO: 30.5 PG (ref 27–31)
MCHC RBC AUTO-ENTMCNC: 34.8 G/DL (ref 32–36)
MCV RBC AUTO: 88 FL (ref 82–98)
MONOCYTES # BLD AUTO: 0.3 K/UL (ref 0.3–1)
MONOCYTES NFR BLD: 8.2 % (ref 4–15)
NEUTROPHILS # BLD AUTO: 1.9 K/UL (ref 1.8–7.7)
NEUTROPHILS NFR BLD: 47.2 % (ref 38–73)
NONHDLC SERPL-MCNC: 216 MG/DL
PLATELET # BLD AUTO: 293 K/UL (ref 150–350)
PMV BLD AUTO: 9.8 FL (ref 9.2–12.9)
POTASSIUM SERPL-SCNC: 4 MMOL/L (ref 3.5–5.1)
PROT SERPL-MCNC: 7.4 G/DL (ref 6–8.4)
RBC # BLD AUTO: 4.92 M/UL (ref 4.6–6.2)
SODIUM SERPL-SCNC: 139 MMOL/L (ref 136–145)
TRIGL SERPL-MCNC: 57 MG/DL (ref 30–150)
TSH SERPL DL<=0.005 MIU/L-ACNC: 0.42 UIU/ML (ref 0.4–4)
WBC # BLD AUTO: 4.13 K/UL (ref 3.9–12.7)

## 2019-07-30 PROCEDURE — 80061 LIPID PANEL: CPT

## 2019-07-30 PROCEDURE — 36415 COLL VENOUS BLD VENIPUNCTURE: CPT | Mod: PO

## 2019-07-30 PROCEDURE — 99213 OFFICE O/P EST LOW 20 MIN: CPT | Mod: PBBFAC,PO | Performed by: FAMILY MEDICINE

## 2019-07-30 PROCEDURE — 99999 PR PBB SHADOW E&M-EST. PATIENT-LVL III: ICD-10-PCS | Mod: PBBFAC,,, | Performed by: FAMILY MEDICINE

## 2019-07-30 PROCEDURE — 84443 ASSAY THYROID STIM HORMONE: CPT

## 2019-07-30 PROCEDURE — 85025 COMPLETE CBC W/AUTO DIFF WBC: CPT

## 2019-07-30 PROCEDURE — 99214 PR OFFICE/OUTPT VISIT, EST, LEVL IV, 30-39 MIN: ICD-10-PCS | Mod: S$PBB,,, | Performed by: FAMILY MEDICINE

## 2019-07-30 PROCEDURE — 99214 OFFICE O/P EST MOD 30 MIN: CPT | Mod: S$PBB,,, | Performed by: FAMILY MEDICINE

## 2019-07-30 PROCEDURE — 80053 COMPREHEN METABOLIC PANEL: CPT

## 2019-07-30 PROCEDURE — 99999 PR PBB SHADOW E&M-EST. PATIENT-LVL III: CPT | Mod: PBBFAC,,, | Performed by: FAMILY MEDICINE

## 2019-07-30 RX ORDER — TRAMADOL HYDROCHLORIDE 50 MG/1
50 TABLET ORAL EVERY 8 HOURS PRN
Qty: 90 TABLET | Refills: 3 | Status: SHIPPED | OUTPATIENT
Start: 2019-07-30 | End: 2019-08-23

## 2019-07-30 RX ORDER — ESCITALOPRAM OXALATE 5 MG/1
5 TABLET ORAL DAILY
Qty: 30 TABLET | Refills: 11 | Status: SHIPPED | OUTPATIENT
Start: 2019-07-30 | End: 2019-08-23

## 2019-07-30 NOTE — PROGRESS NOTES
Subjective:       Patient ID: Slime Bradshaw is a 38 y.o. male.    Chief Complaint: Back Pain    38 year old female presents for follow up on his chronic back pain. He was seen Dr. Poole, Pain medicine,  and was discharged from the clinic. He has received some steroid injections, physical therapy, and pain medication with tramadol. 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 He was in the  for 3-4 years. He was in the  at the age of [age]. He has the pain in his lower back and it used to be on the left, but now it is bilateral. He has pain that is worse than others. He has pain with physical activity. He gets shooting pains in his lower back that feels like spasms. He states he has had to lie down as a result. 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 He takes tramadol for pain, which gives him some relief. He has had a left L5-S1 TF GIANNA on 5/21/18 by interventional radiology.    He is also still dealing with anxiety attacks. He states he feels like hie wants to jump out of his skin. He gets irritated easily. He has these symptoms 4 times a week and it is increasing. He states when his back hurts his anxiety gets worse.       MRI of Lumbar spine ( 2/16/18) showed:  Lumbar spine alignment is within normal limits. The vertebral body heights are well maintained, with no fracture.  No marrow signal abnormality suspicious for an infiltrative process.  Incidental note is made of a focal area of sclerosis adjacent to the left sacroiliac joint likely representing enostosis.    There are findings of multi-level lumbar spondylosis, as below.  L1-L2: There is no focal disc herniation. No significant spinal canal or neural foraminal narrowing.  L2-L3: There is no focal disc herniation. No significant spinal canal or neural foraminal narrowing.  L3-L4: There is no focal disc herniation. No significant spinal canal or  neural foraminal narrowing.  L4-L5: There is no focal disc herniation. No significant spinal canal or neural foraminal narrowing.  L5-S1: Moderate bilateral facet arthropathy without significant spinal canal or neural foraminal narrowing.  The conus is normal in appearance, and terminates at the L1-L2 level.   The adjacent soft tissue structures show no significant abnormalities.  Impression:  Bilateral facet arthropathy at L5-S1 without significant spinal canal stenosis or neural foraminal narrowing.         History reviewed. No pertinent past medical history.   Past Surgical History:  No date: KNEE ARTHROSCOPY W/ ACL RECONSTRUCTION      Comment:  right  No date: WISDOM TOOTH EXTRACTION  Review of patient's family history indicates:  Problem: Cancer      Relation: Father          Age of Onset: (Not Specified)          Comment: throat-- smoker  Problem: Hypertension      Relation: Mother          Age of Onset: (Not Specified)  Problem: Diabetes      Relation: Mother          Age of Onset: (Not Specified)  Problem: Hypertension      Relation: Brother          Age of Onset: (Not Specified)    Social History    Socioeconomic History      Marital status:       Spouse name: Not on file      Number of children: Not on file      Years of education: Not on file      Highest education level: Not on file    Occupational History      Not on file    Social Needs      Financial resource strain: Not on file      Food insecurity:        Worry: Not on file        Inability: Not on file      Transportation needs:        Medical: Not on file        Non-medical: Not on file    Tobacco Use      Smoking status: Never Smoker    Substance and Sexual Activity      Alcohol use: Yes        Alcohol/week: 0.0 - 1.0 oz      Drug use: No      Sexual activity: Yes        Partners: Female    Lifestyle      Physical activity:        Days per week: Not on file        Minutes per session: Not on file      Stress: Not on file    Relationships    "   Social connections:        Talks on phone: Not on file        Gets together: Not on file        Attends Scientologist service: Not on file        Active member of club or organization: Not on file        Attends meetings of clubs or organizations: Not on file        Relationship status: Not on file    Other Topics      Concerns:        Not on file    Social History Narrative      Works as a  for [employer]        Review of Systems    Objective:       Vitals:    07/30/19 0810   BP: 124/78   Pulse: 72   Temp: 98.5 °F (36.9 °C)   TempSrc: Oral   Weight: 103 kg (227 lb 1.2 oz)   Height: 6' 2" (1.88 m)       Physical Exam   Constitutional: He appears well-developed and well-nourished. No distress.   HENT:   Head: Normocephalic and atraumatic.   Musculoskeletal:        Lumbar back: He exhibits decreased range of motion, tenderness, pain and spasm. He exhibits no bony tenderness, no swelling, no edema, no deformity and no laceration.        Back:    Skin: He is not diaphoretic.       Assessment:       1. Lumbosacral pain, chronic    2. Facet arthritis of lumbosacral region    3. Chronic left-sided low back pain without sciatica    4. SI (sacroiliac) joint dysfunction    5. Sacroiliac joint dysfunction of left side    6. SHARON (generalized anxiety disorder)    7. BMI 29.0-29.9,adult        Plan:       Slime was seen today for back pain.    Diagnoses and all orders for this visit:    Lumbosacral pain, chronic  -     traMADol (ULTRAM) 50 mg tablet; Take 1 tablet (50 mg total) by mouth every 8 (eight) hours as needed for Pain.    Facet arthritis of lumbosacral region  -     traMADol (ULTRAM) 50 mg tablet; Take 1 tablet (50 mg total) by mouth every 8 (eight) hours as needed for Pain.  Stable. Refilled meds.     Chronic left-sided low back pain without sciatica  -     traMADol (ULTRAM) 50 mg tablet; Take 1 tablet (50 mg total) by mouth every 8 (eight) hours as needed for Pain.    SI (sacroiliac) joint " dysfunction  -     traMADol (ULTRAM) 50 mg tablet; Take 1 tablet (50 mg total) by mouth every 8 (eight) hours as needed for Pain.    Sacroiliac joint dysfunction of left side  -     traMADol (ULTRAM) 50 mg tablet; Take 1 tablet (50 mg total) by mouth every 8 (eight) hours as needed for Pain.    SHARON (generalized anxiety disorder)  -     escitalopram oxalate (LEXAPRO) 5 MG Tab; Take 1 tablet (5 mg total) by mouth once daily.  -     Comprehensive metabolic panel; Future  -     CBC auto differential; Future  -     TSH; Future  Starting lexapro and due for labs.     BMI 29.0-29.9,adult  -     Comprehensive metabolic panel; Future  -     Lipid panel; Future  -     CBC auto differential; Future  -     TSH; Future

## 2019-08-20 ENCOUNTER — PATIENT MESSAGE (OUTPATIENT)
Dept: FAMILY MEDICINE | Facility: CLINIC | Age: 39
End: 2019-08-20

## 2019-08-20 RX ORDER — ATORVASTATIN CALCIUM 40 MG/1
40 TABLET, FILM COATED ORAL DAILY
Qty: 90 TABLET | Refills: 3 | Status: SHIPPED | OUTPATIENT
Start: 2019-08-20 | End: 2019-08-23

## 2019-08-23 ENCOUNTER — HOSPITAL ENCOUNTER (EMERGENCY)
Facility: OTHER | Age: 39
Discharge: HOME | End: 2019-08-23
Attending: EMERGENCY MEDICINE
Payer: MEDICAID

## 2019-08-23 VITALS
BODY MASS INDEX: 26.95 KG/M2 | HEART RATE: 68 BPM | OXYGEN SATURATION: 97 % | SYSTOLIC BLOOD PRESSURE: 158 MMHG | TEMPERATURE: 98 F | WEIGHT: 210 LBS | RESPIRATION RATE: 18 BRPM | DIASTOLIC BLOOD PRESSURE: 85 MMHG | HEIGHT: 74 IN

## 2019-08-23 DIAGNOSIS — E86.0 DEHYDRATION: Primary | ICD-10-CM

## 2019-08-23 DIAGNOSIS — R42 DIZZINESS: ICD-10-CM

## 2019-08-23 LAB
ALBUMIN SERPL BCP-MCNC: 3.8 G/DL (ref 3.5–5.2)
ALP SERPL-CCNC: 45 U/L (ref 55–135)
ALT SERPL W/O P-5'-P-CCNC: 37 U/L (ref 10–44)
ANION GAP SERPL CALC-SCNC: 8 MMOL/L (ref 8–16)
AST SERPL-CCNC: 22 U/L (ref 10–40)
BASOPHILS # BLD AUTO: 0.01 K/UL (ref 0–0.2)
BASOPHILS NFR BLD: 0.2 % (ref 0–1.9)
BILIRUB SERPL-MCNC: 0.9 MG/DL (ref 0.1–1)
BUN SERPL-MCNC: 12 MG/DL (ref 6–20)
CALCIUM SERPL-MCNC: 9 MG/DL (ref 8.7–10.5)
CHLORIDE SERPL-SCNC: 107 MMOL/L (ref 95–110)
CK SERPL-CCNC: 190 U/L (ref 20–200)
CO2 SERPL-SCNC: 26 MMOL/L (ref 23–29)
CREAT SERPL-MCNC: 1.1 MG/DL (ref 0.5–1.4)
DIFFERENTIAL METHOD: ABNORMAL
EOSINOPHIL # BLD AUTO: 0.1 K/UL (ref 0–0.5)
EOSINOPHIL NFR BLD: 3 % (ref 0–8)
ERYTHROCYTE [DISTWIDTH] IN BLOOD BY AUTOMATED COUNT: 11.6 % (ref 11.5–14.5)
EST. GFR  (AFRICAN AMERICAN): >60 ML/MIN/1.73 M^2
EST. GFR  (NON AFRICAN AMERICAN): >60 ML/MIN/1.73 M^2
GLUCOSE SERPL-MCNC: 97 MG/DL (ref 70–110)
HCT VFR BLD AUTO: 45.7 % (ref 40–54)
HGB BLD-MCNC: 15.8 G/DL (ref 14–18)
IMM GRANULOCYTES # BLD AUTO: 0.01 K/UL (ref 0–0.04)
IMM GRANULOCYTES NFR BLD AUTO: 0.2 % (ref 0–0.5)
LYMPHOCYTES # BLD AUTO: 1.8 K/UL (ref 1–4.8)
LYMPHOCYTES NFR BLD: 43.8 % (ref 18–48)
MCH RBC QN AUTO: 29.9 PG (ref 27–31)
MCHC RBC AUTO-ENTMCNC: 34.6 G/DL (ref 32–36)
MCV RBC AUTO: 86 FL (ref 82–98)
MONOCYTES # BLD AUTO: 0.3 K/UL (ref 0.3–1)
MONOCYTES NFR BLD: 7.4 % (ref 4–15)
NEUTROPHILS # BLD AUTO: 1.8 K/UL (ref 1.8–7.7)
NEUTROPHILS NFR BLD: 45.4 % (ref 38–73)
NRBC BLD-RTO: 0 /100 WBC
PLATELET # BLD AUTO: 256 K/UL (ref 150–350)
PMV BLD AUTO: 9.1 FL (ref 9.2–12.9)
POTASSIUM SERPL-SCNC: 3.8 MMOL/L (ref 3.5–5.1)
PROT SERPL-MCNC: 6.8 G/DL (ref 6–8.4)
RBC # BLD AUTO: 5.29 M/UL (ref 4.6–6.2)
SODIUM SERPL-SCNC: 141 MMOL/L (ref 136–145)
WBC # BLD AUTO: 4.04 K/UL (ref 3.9–12.7)

## 2019-08-23 PROCEDURE — 93010 ELECTROCARDIOGRAM REPORT: CPT | Mod: ,,, | Performed by: INTERNAL MEDICINE

## 2019-08-23 PROCEDURE — 63600175 PHARM REV CODE 636 W HCPCS: Performed by: EMERGENCY MEDICINE

## 2019-08-23 PROCEDURE — 96374 THER/PROPH/DIAG INJ IV PUSH: CPT

## 2019-08-23 PROCEDURE — 80053 COMPREHEN METABOLIC PANEL: CPT

## 2019-08-23 PROCEDURE — 85025 COMPLETE CBC W/AUTO DIFF WBC: CPT

## 2019-08-23 PROCEDURE — 93010 EKG 12-LEAD: ICD-10-PCS | Mod: ,,, | Performed by: INTERNAL MEDICINE

## 2019-08-23 PROCEDURE — 36415 COLL VENOUS BLD VENIPUNCTURE: CPT

## 2019-08-23 PROCEDURE — 82550 ASSAY OF CK (CPK): CPT

## 2019-08-23 PROCEDURE — 93005 ELECTROCARDIOGRAM TRACING: CPT

## 2019-08-23 PROCEDURE — 99284 EMERGENCY DEPT VISIT MOD MDM: CPT | Mod: 25

## 2019-08-23 PROCEDURE — 96361 HYDRATE IV INFUSION ADD-ON: CPT

## 2019-08-23 RX ORDER — ONDANSETRON 2 MG/ML
4 INJECTION INTRAMUSCULAR; INTRAVENOUS ONCE AS NEEDED
Status: COMPLETED | OUTPATIENT
Start: 2019-08-23 | End: 2019-08-23

## 2019-08-23 RX ADMIN — SODIUM CHLORIDE 1000 ML: 0.9 INJECTION, SOLUTION INTRAVENOUS at 03:08

## 2019-08-23 RX ADMIN — ONDANSETRON 4 MG: 2 INJECTION INTRAMUSCULAR; INTRAVENOUS at 03:08

## 2019-08-23 NOTE — ED PROVIDER NOTES
"Encounter Date: 8/23/2019    SCRIBE #1 NOTE: I, Nicolle Hollins, am scribing for, and in the presence of, Dr. Melton.       History     Chief Complaint   Patient presents with    Dizziness     Pt c/o "spinning" sensation & N/V which started this morning, unable to hold down PO fluids. Pt denies extremity numbness, CP & H/A.     Time seen by provider: 2:29 PM    This is a 38 y.o. male who presents with complaint of dizziness and light headedness this morning. He describes feeling near syncope. He reports fatigue and N/V. He was able to tolerate breakfast this morning. He denies fever, diarrhea, or headache. He notes recent heavy lifting as he moved into a new house last month. No sick contacts, though he does have young children that recently started school.    The history is provided by the patient.     Review of patient's allergies indicates:   Allergen Reactions    Hydrocodone Itching    Iodine      Other reaction(s): Seafood - swelling, Itch     History reviewed. No pertinent past medical history.  Past Surgical History:   Procedure Laterality Date    KNEE ARTHROSCOPY W/ ACL RECONSTRUCTION      right    WISDOM TOOTH EXTRACTION       Family History   Problem Relation Age of Onset    Cancer Father         throat-- smoker    Hypertension Mother     Diabetes Mother     Hypertension Brother      Social History     Tobacco Use    Smoking status: Never Smoker   Substance Use Topics    Alcohol use: Yes     Alcohol/week: 0.0 - 1.0 oz    Drug use: No     Review of Systems   Constitutional: Positive for fatigue. Negative for chills and fever.   HENT: Negative for sore throat.    Respiratory: Negative for cough and shortness of breath.    Cardiovascular: Negative for chest pain.   Gastrointestinal: Positive for nausea and vomiting. Negative for abdominal distention, abdominal pain, blood in stool, constipation and diarrhea.   Genitourinary: Negative for dysuria.   Musculoskeletal: Negative for back pain.   Skin: " Negative for color change, rash and wound.   Neurological: Positive for dizziness and light-headedness. Negative for weakness and headaches.   Hematological: Does not bruise/bleed easily.   All other systems reviewed and are negative.      Physical Exam     Initial Vitals [08/23/19 1339]   BP Pulse Resp Temp SpO2   (!) 147/89 73 18 97.8 °F (36.6 °C) 98 %      MAP       --         Physical Exam    Nursing note and vitals reviewed.  Constitutional: He appears well-developed and well-nourished. He is not diaphoretic. No distress.   HENT:   Head: Normocephalic and atraumatic.   Right Ear: External ear normal.   Left Ear: External ear normal.   Nose: Nose normal.   Mouth/Throat: Mucous membranes are dry.   Eyes: Conjunctivae and EOM are normal. Pupils are equal, round, and reactive to light. No scleral icterus.   Neck: Normal range of motion. Neck supple.   Cardiovascular: Normal rate, regular rhythm, normal heart sounds and intact distal pulses. Exam reveals no gallop and no friction rub.    No murmur heard.  Pulmonary/Chest: Breath sounds normal. No stridor. No respiratory distress. He has no wheezes. He has no rhonchi. He has no rales. He exhibits no tenderness.   Abdominal: Soft. Bowel sounds are normal. He exhibits no distension. There is no tenderness. There is no rebound and no guarding.   Musculoskeletal: Normal range of motion.   Neurological: He is alert and oriented to person, place, and time. He has normal strength. No cranial nerve deficit or sensory deficit. GCS score is 15. GCS eye subscore is 4. GCS verbal subscore is 5. GCS motor subscore is 6.   Skin: Skin is warm and dry. Capillary refill takes less than 2 seconds.   Psychiatric: He has a normal mood and affect.         ED Course   Procedures  Labs Reviewed   CBC W/ AUTO DIFFERENTIAL - Abnormal; Notable for the following components:       Result Value    MPV 9.1 (*)     All other components within normal limits   COMPREHENSIVE METABOLIC PANEL -  Abnormal; Notable for the following components:    Alkaline Phosphatase 45 (*)     All other components within normal limits    Narrative:     Recoll. [id number] by PHONG at 08/23/2019 15:42, reason: Specimen   hemolyzed. Notified Rachel Claros RN/ER. Lab to Recollect.   08/23/2019  15:42   CK    Narrative:     Recoll. [id number] by [place] at 08/23/2019 15:42, reason: Specimen   hemolyzed. Notified Rachel Claros RN/ER. Lab to Recollect.   08/23/2019  15:42     EKG Readings: (Independently Interpreted)   Rhythm: Normal Sinus Rhythm. Heart Rate: 61. Ectopy: No Ectopy. Conduction: Normal. ST Segments: Normal ST Segments. T Waves: Normal. Clinical Impression: Normal Sinus Rhythm       Imaging Results    None          Medical Decision Making:   History:   Old Medical Records: I decided to obtain old medical records.  Initial Assessment:   38 y.o. male with no significant past medical history presents to the Emergency Department with complaint of dizziness, nausea, near syncope following exertion in the heat  Differential Diagnosis:   Peripheral vertigo, inner ear disease, vestibular neuritis, migraine headache, meniere disease, vestibular tumor, CVA, carotid disease or dissection, orthostatic hypotension, dehydration, electrolyte abnormality, anemia, arrhythmia, myocardial ischemia   Independently Interpreted Test(s):   I have ordered and independently interpreted EKG Reading(s) - see prior notes  Clinical Tests:   Lab Tests: Ordered and Reviewed  Medical Tests: Ordered and Reviewed  ED Management:  Patient improved with treatment in the emergency department and comfortable going home. Discussed reasons to return and importance of followup.  Patient understands that the emergency visit today is primarily to address immediate concerns and to rule out emergent cause of symptoms and that they may require further workup and evaluation as an outpatient. All questions addressed and patient given discharge instructions and followup  information.               Scribe Attestation:   Scribe #1: I performed the above scribed service and the documentation accurately describes the services I performed. I attest to the accuracy of the note.    Attending Attestation:           Physician Attestation for Scribe:  Physician Attestation Statement for Scribe #1: I, Dr. Melton, reviewed documentation, as scribed by Nicolle Hollins in my presence, and it is both accurate and complete.                 ED Course as of Aug 23 1921   Fri Aug 23, 2019   1640 Reassessed. Patient feels better. Advised to drink a lot of fluids.    [AC]      ED Course User Index  [AC] Nicolle Hollins     Clinical Impression:     1. Dehydration    2. Dizziness          Disposition:   Disposition: Discharged  Condition: Stable                        Ivonne Melton MD  08/25/19 5430

## 2019-08-23 NOTE — ED NOTES
ROUNDING:  Reclining in chair, AAOx4, Denies pain/any other discomfort at this time, Skin is warm and dry, Resp. even and unlabored, Comfort and BR needs addressed, Plan of care updated, NADN, Automatic BP cuff and pulse ox in place, Recliner wheels locked and call light within reach, Will continue to monitor.

## 2019-08-23 NOTE — ED TRIAGE NOTES
"Patient presents to ER n/v that started this morning.  Patient reports he is unable to keep and food or liquids down. Pt states "I just feel crappy."  Patient denies chest pain and sob.  "

## 2019-10-10 ENCOUNTER — HOSPITAL ENCOUNTER (EMERGENCY)
Facility: OTHER | Age: 39
Discharge: HOME | End: 2019-10-10
Attending: EMERGENCY MEDICINE
Payer: MEDICAID

## 2019-10-10 VITALS
OXYGEN SATURATION: 96 % | HEART RATE: 66 BPM | RESPIRATION RATE: 16 BRPM | WEIGHT: 235 LBS | SYSTOLIC BLOOD PRESSURE: 138 MMHG | TEMPERATURE: 98 F | DIASTOLIC BLOOD PRESSURE: 81 MMHG | HEIGHT: 74 IN | BODY MASS INDEX: 30.16 KG/M2

## 2019-10-10 DIAGNOSIS — R10.84 GENERALIZED ABDOMINAL PAIN: Primary | ICD-10-CM

## 2019-10-10 DIAGNOSIS — K59.00 CONSTIPATION, UNSPECIFIED CONSTIPATION TYPE: ICD-10-CM

## 2019-10-10 LAB
ALBUMIN SERPL BCP-MCNC: 3.9 G/DL (ref 3.5–5.2)
ALP SERPL-CCNC: 62 U/L (ref 55–135)
ALT SERPL W/O P-5'-P-CCNC: 40 U/L (ref 10–44)
ANION GAP SERPL CALC-SCNC: 10 MMOL/L (ref 8–16)
AST SERPL-CCNC: 24 U/L (ref 10–40)
BASOPHILS # BLD AUTO: 0.01 K/UL (ref 0–0.2)
BASOPHILS NFR BLD: 0.2 % (ref 0–1.9)
BILIRUB SERPL-MCNC: 0.5 MG/DL (ref 0.1–1)
BILIRUB UR QL STRIP: NEGATIVE
BUN SERPL-MCNC: 13 MG/DL (ref 6–20)
CALCIUM SERPL-MCNC: 9.6 MG/DL (ref 8.7–10.5)
CHLORIDE SERPL-SCNC: 103 MMOL/L (ref 95–110)
CLARITY UR: CLEAR
CO2 SERPL-SCNC: 23 MMOL/L (ref 23–29)
COLOR UR: YELLOW
CREAT SERPL-MCNC: 1 MG/DL (ref 0.5–1.4)
DIFFERENTIAL METHOD: ABNORMAL
EOSINOPHIL # BLD AUTO: 0.2 K/UL (ref 0–0.5)
EOSINOPHIL NFR BLD: 3.1 % (ref 0–8)
ERYTHROCYTE [DISTWIDTH] IN BLOOD BY AUTOMATED COUNT: 11.6 % (ref 11.5–14.5)
EST. GFR  (AFRICAN AMERICAN): >60 ML/MIN/1.73 M^2
EST. GFR  (NON AFRICAN AMERICAN): >60 ML/MIN/1.73 M^2
GLUCOSE SERPL-MCNC: 133 MG/DL (ref 70–110)
GLUCOSE UR QL STRIP: NEGATIVE
HCT VFR BLD AUTO: 44.1 % (ref 40–54)
HGB BLD-MCNC: 15.3 G/DL (ref 14–18)
HGB UR QL STRIP: NEGATIVE
IMM GRANULOCYTES # BLD AUTO: 0.01 K/UL (ref 0–0.04)
IMM GRANULOCYTES NFR BLD AUTO: 0.2 % (ref 0–0.5)
KETONES UR QL STRIP: NEGATIVE
LEUKOCYTE ESTERASE UR QL STRIP: NEGATIVE
LIPASE SERPL-CCNC: 17 U/L (ref 4–60)
LYMPHOCYTES # BLD AUTO: 1.4 K/UL (ref 1–4.8)
LYMPHOCYTES NFR BLD: 29.9 % (ref 18–48)
MCH RBC QN AUTO: 30.1 PG (ref 27–31)
MCHC RBC AUTO-ENTMCNC: 34.7 G/DL (ref 32–36)
MCV RBC AUTO: 87 FL (ref 82–98)
MONOCYTES # BLD AUTO: 0.4 K/UL (ref 0.3–1)
MONOCYTES NFR BLD: 7.3 % (ref 4–15)
NEUTROPHILS # BLD AUTO: 2.8 K/UL (ref 1.8–7.7)
NEUTROPHILS NFR BLD: 59.3 % (ref 38–73)
NITRITE UR QL STRIP: NEGATIVE
NRBC BLD-RTO: 0 /100 WBC
PH UR STRIP: 7 [PH] (ref 5–8)
PLATELET # BLD AUTO: 292 K/UL (ref 150–350)
PMV BLD AUTO: 9 FL (ref 9.2–12.9)
POTASSIUM SERPL-SCNC: 4.1 MMOL/L (ref 3.5–5.1)
PROT SERPL-MCNC: 7.7 G/DL (ref 6–8.4)
PROT UR QL STRIP: NEGATIVE
RBC # BLD AUTO: 5.09 M/UL (ref 4.6–6.2)
SODIUM SERPL-SCNC: 136 MMOL/L (ref 136–145)
SP GR UR STRIP: <=1.005 (ref 1–1.03)
URN SPEC COLLECT METH UR: ABNORMAL
UROBILINOGEN UR STRIP-ACNC: NEGATIVE EU/DL
WBC # BLD AUTO: 4.79 K/UL (ref 3.9–12.7)

## 2019-10-10 PROCEDURE — 99284 EMERGENCY DEPT VISIT MOD MDM: CPT | Mod: 25

## 2019-10-10 PROCEDURE — 96374 THER/PROPH/DIAG INJ IV PUSH: CPT

## 2019-10-10 PROCEDURE — 81003 URINALYSIS AUTO W/O SCOPE: CPT

## 2019-10-10 PROCEDURE — 80053 COMPREHEN METABOLIC PANEL: CPT

## 2019-10-10 PROCEDURE — 83690 ASSAY OF LIPASE: CPT

## 2019-10-10 PROCEDURE — 63600175 PHARM REV CODE 636 W HCPCS: Performed by: EMERGENCY MEDICINE

## 2019-10-10 PROCEDURE — 85025 COMPLETE CBC W/AUTO DIFF WBC: CPT

## 2019-10-10 RX ORDER — TRAMADOL HYDROCHLORIDE 50 MG/1
50 TABLET ORAL EVERY 6 HOURS
COMMUNITY
End: 2020-02-11 | Stop reason: SDUPTHER

## 2019-10-10 RX ORDER — ONDANSETRON 2 MG/ML
4 INJECTION INTRAMUSCULAR; INTRAVENOUS
Status: COMPLETED | OUTPATIENT
Start: 2019-10-10 | End: 2019-10-10

## 2019-10-10 RX ADMIN — SODIUM CHLORIDE 1000 ML: 0.9 INJECTION, SOLUTION INTRAVENOUS at 02:10

## 2019-10-10 RX ADMIN — ONDANSETRON 4 MG: 2 INJECTION INTRAMUSCULAR; INTRAVENOUS at 02:10

## 2019-10-10 NOTE — ED NOTES
Pt resting quietly. Visible rise and fall of chest. No acute distress noted at this time. Bed in low and locked position. Side rails raised x 2. Call light within reach. Will continue to monitor.

## 2019-10-10 NOTE — ED PROVIDER NOTES
"Encounter Date: 10/10/2019    SCRIBE #1 NOTE: Timi MAK am scribing for, and in the presence of, Dr. Fernandez .       History     Chief Complaint   Patient presents with    Abdominal Pain     pt reports constant sharp abdominal pain with onset 3 day ago, 3 episodes of emesis within 12 hours     Time seen by provider: 2:15 AM    This is a 39 y.o. male who presents with complaint of constant abdominal pain and cramping with onset three days ago but progressively worsened tonight.  He reports HA and and vomiting. Patient denies any fever, constipation, diarrhea, urinary troubles, or tarry stool. Patient denies any suspicious intake. His wife reports that he usually takes Tramadol for back pain but recently stopped and started taking Kratom. Patient reports that his cramping preceded Kratom, however, his wife mentions that he's changed his intake from powder to capsule. He denies any past troubles with gallbladder, liver, or pancreas. He denies smoking, hasn't drank EtOH in the past couple of years. His PCP is Dr. Lozoya at [facility]. He reports that he has been able to eat but wife states that he complains of his abdomen feeling " full." Patient reports no other identifying, alleviating, or exacerbating factors.       The history is provided by the patient and the spouse.     Review of patient's allergies indicates:   Allergen Reactions    Hydrocodone Itching    Iodine      Other reaction(s): Seafood - swelling, Itch     History reviewed. No pertinent past medical history.  Past Surgical History:   Procedure Laterality Date    KNEE ARTHROSCOPY W/ ACL RECONSTRUCTION      right    WISDOM TOOTH EXTRACTION       Family History   Problem Relation Age of Onset    Cancer Father         throat-- smoker    Hypertension Mother     Diabetes Mother     Hypertension Brother      Social History     Tobacco Use    Smoking status: Never Smoker   Substance Use Topics    Alcohol use: Not Currently     Alcohol/week: 0.0 " - 1.7 standard drinks    Drug use: No     Review of Systems   Constitutional: Negative for fever.   HENT: Negative for congestion.    Respiratory: Negative for cough.    Cardiovascular: Negative for chest pain.   Gastrointestinal: Positive for abdominal pain and vomiting. Negative for constipation and diarrhea.   Genitourinary: Negative for dysuria.   Musculoskeletal: Negative for neck stiffness.   Skin: Negative for wound.   Neurological: Positive for headaches.   Psychiatric/Behavioral: Negative for agitation.       Physical Exam     Initial Vitals [10/10/19 0158]   BP Pulse Resp Temp SpO2   (!) 145/88 70 18 98.1 °F (36.7 °C) 96 %      MAP       --         Physical Exam    Nursing note and vitals reviewed.  Constitutional: He appears well-developed and well-nourished. He is not diaphoretic. No distress.   HENT:   Head: Normocephalic and atraumatic.   Mildly dry mucous membranes.   Eyes: Conjunctivae and EOM are normal. Pupils are equal, round, and reactive to light.   No pallor or icterus.   Neck: Normal range of motion. Neck supple. No JVD present.   Cardiovascular: Normal rate, regular rhythm, normal heart sounds and intact distal pulses. Exam reveals no gallop and no friction rub.    No murmur heard.  Pulmonary/Chest: Breath sounds normal. No respiratory distress. He has no wheezes. He has no rhonchi. He has no rales.   Abdominal:   Diffuse tenderness to palpation without rebound or guarding. Negative Somers's sign. No focal tenderness at McBurney's point. Abdomen mildly distended. Bowel sounds normal throughout exam.    Musculoskeletal: Normal range of motion.   Neurological: He is alert.   Skin: Skin is warm and dry. Capillary refill takes less than 2 seconds.   Psychiatric: Thought content normal.         ED Course   Procedures  Labs Reviewed   CBC W/ AUTO DIFFERENTIAL - Abnormal; Notable for the following components:       Result Value    MPV 9.0 (*)     All other components within normal limits    COMPREHENSIVE METABOLIC PANEL - Abnormal; Notable for the following components:    Glucose 133 (*)     All other components within normal limits   URINALYSIS - Abnormal; Notable for the following components:    Specific Gravity, UA <=1.005 (*)     All other components within normal limits   LIPASE          Imaging Results          X-Ray Abdomen Flat And Erect (Final result)  Result time 10/10/19 02:52:04    Final result by Seven Strong MD (10/10/19 02:52:04)                 Impression:      Nonobstructive bowel gas pattern.  Moderate volume retained stool throughout the colon which could be seen with constipation.  Clinical correlation advised.      Electronically signed by: Seven Strong MD  Date:    10/10/2019  Time:    02:52             Narrative:    EXAMINATION:  XR ABDOMEN FLAT AND ERECT    CLINICAL HISTORY:  abd pain;    TECHNIQUE:  Flat and erect AP views of the abdomen were performed.    COMPARISON:  None    FINDINGS:  Scattered air is seen in nondilated loops of small and large bowel. No central small bowel air fluid levels are appreciated.  No definite evidence of free intraperitoneal air.  There is moderate volume of retained stool throughout the colon which may relate to constipation.  The visualized osseous structures appear intact.  The visualized lung bases appear clear.                              X-Rays:   Independently Interpreted Readings:   Abdomen: Large amount of chalonic stool. No air fluid or free air.     Medical Decision Making:   History:   Old Medical Records: I decided to obtain old medical records.  Independently Interpreted Test(s):   I have ordered and independently interpreted X-rays - see prior notes.  Clinical Tests:   Lab Tests: Ordered and Reviewed  Radiological Study: Ordered and Reviewed            Scribe Attestation:   Scribe #1: I performed the above scribed service and the documentation accurately describes the services I performed. I attest to the accuracy of the  note.    Attending Attestation:           Physician Attestation for Scribe:  Physician Attestation Statement for Scribe #1: I, Dr. Fernandez , reviewed documentation, as scribed by Timi Valdez in my presence, and it is both accurate and complete.           Patient presents complaining of several days of abdominal pain and cramping getting progressively worse.  Mild relief with his wife's Bentyl.  He was taking tramadol but states he stopped that a week or so ago, has switched to kratom, but he has taken this in the past without problems.  Denied change in stool.  No fevers.  On exam he is afebrile, vital signs stable, benign abdomen.  Laboratory workup is unremarkable however abdominal x-ray shows significant colonic stool burden, despite patient's report of normal bowel movements.  I suspect that this is cause of his cramping abdominal pain. Recommend that he use over-the-counter Mag citrate and/or glycerin suppositories and after effect start high-fiber diet, fluids, Colace.  Discussed return precautions with him, specifically related to abdominal pain such as fever persistent vomiting worsening pain blood/melena etc encouraged follow-up with primary care, especially if symptoms persist.             Clinical Impression:     1. Generalized abdominal pain    2. Constipation, unspecified constipation type                               Pantera Fernandez II, MD  10/10/19 6307

## 2019-10-17 DIAGNOSIS — G47.00 INSOMNIA, UNSPECIFIED TYPE: ICD-10-CM

## 2019-10-18 RX ORDER — ZOLPIDEM TARTRATE 10 MG/1
TABLET ORAL
Qty: 30 TABLET | Refills: 0 | Status: SHIPPED | OUTPATIENT
Start: 2019-10-18 | End: 2020-02-11 | Stop reason: SDUPTHER

## 2019-12-17 RX ORDER — ATORVASTATIN CALCIUM 40 MG/1
40 TABLET, FILM COATED ORAL DAILY
Qty: 90 TABLET | Refills: 3 | Status: SHIPPED | OUTPATIENT
Start: 2019-12-17 | End: 2020-02-11

## 2020-02-10 RX ORDER — TRAMADOL HYDROCHLORIDE 50 MG/1
TABLET ORAL
Qty: 90 TABLET | OUTPATIENT
Start: 2020-02-10

## 2020-02-11 ENCOUNTER — TELEPHONE (OUTPATIENT)
Dept: FAMILY MEDICINE | Facility: CLINIC | Age: 40
End: 2020-02-11

## 2020-02-11 ENCOUNTER — OFFICE VISIT (OUTPATIENT)
Dept: FAMILY MEDICINE | Facility: CLINIC | Age: 40
End: 2020-02-11
Payer: MEDICAID

## 2020-02-11 ENCOUNTER — LAB VISIT (OUTPATIENT)
Dept: LAB | Facility: HOSPITAL | Age: 40
End: 2020-02-11
Attending: FAMILY MEDICINE
Payer: MEDICAID

## 2020-02-11 VITALS
TEMPERATURE: 98 F | WEIGHT: 233.69 LBS | OXYGEN SATURATION: 97 % | SYSTOLIC BLOOD PRESSURE: 128 MMHG | HEIGHT: 74 IN | DIASTOLIC BLOOD PRESSURE: 84 MMHG | HEART RATE: 60 BPM | BODY MASS INDEX: 29.99 KG/M2

## 2020-02-11 DIAGNOSIS — M54.50 CHRONIC MIDLINE LOW BACK PAIN WITHOUT SCIATICA: ICD-10-CM

## 2020-02-11 DIAGNOSIS — E78.5 HYPERLIPIDEMIA, UNSPECIFIED HYPERLIPIDEMIA TYPE: ICD-10-CM

## 2020-02-11 DIAGNOSIS — E78.5 HYPERLIPIDEMIA, UNSPECIFIED HYPERLIPIDEMIA TYPE: Primary | ICD-10-CM

## 2020-02-11 DIAGNOSIS — Z00.8 TESTICULAR EXAM: ICD-10-CM

## 2020-02-11 DIAGNOSIS — G89.29 CHRONIC MIDLINE LOW BACK PAIN WITHOUT SCIATICA: ICD-10-CM

## 2020-02-11 DIAGNOSIS — G47.00 INSOMNIA, UNSPECIFIED TYPE: ICD-10-CM

## 2020-02-11 LAB
ALBUMIN SERPL BCP-MCNC: 3.8 G/DL (ref 3.5–5.2)
ALP SERPL-CCNC: 55 U/L (ref 55–135)
ALT SERPL W/O P-5'-P-CCNC: 35 U/L (ref 10–44)
ANION GAP SERPL CALC-SCNC: 6 MMOL/L (ref 8–16)
AST SERPL-CCNC: 23 U/L (ref 10–40)
BILIRUB SERPL-MCNC: 0.7 MG/DL (ref 0.1–1)
BUN SERPL-MCNC: 11 MG/DL (ref 6–20)
CALCIUM SERPL-MCNC: 9.7 MG/DL (ref 8.7–10.5)
CHLORIDE SERPL-SCNC: 105 MMOL/L (ref 95–110)
CHOLEST SERPL-MCNC: 283 MG/DL (ref 120–199)
CHOLEST/HDLC SERPL: 6.3 {RATIO} (ref 2–5)
CO2 SERPL-SCNC: 26 MMOL/L (ref 23–29)
CREAT SERPL-MCNC: 1 MG/DL (ref 0.5–1.4)
EST. GFR  (AFRICAN AMERICAN): >60 ML/MIN/1.73 M^2
EST. GFR  (NON AFRICAN AMERICAN): >60 ML/MIN/1.73 M^2
GLUCOSE SERPL-MCNC: 122 MG/DL (ref 70–110)
HDLC SERPL-MCNC: 45 MG/DL (ref 40–75)
HDLC SERPL: 15.9 % (ref 20–50)
LDLC SERPL CALC-MCNC: 213.8 MG/DL (ref 63–159)
NONHDLC SERPL-MCNC: 238 MG/DL
POTASSIUM SERPL-SCNC: 3.8 MMOL/L (ref 3.5–5.1)
PROT SERPL-MCNC: 7.3 G/DL (ref 6–8.4)
SODIUM SERPL-SCNC: 137 MMOL/L (ref 136–145)
T4 FREE SERPL-MCNC: 0.88 NG/DL (ref 0.71–1.51)
TRIGL SERPL-MCNC: 121 MG/DL (ref 30–150)
TSH SERPL DL<=0.005 MIU/L-ACNC: 0.34 UIU/ML (ref 0.4–4)

## 2020-02-11 PROCEDURE — 99999 PR PBB SHADOW E&M-EST. PATIENT-LVL III: ICD-10-PCS | Mod: PBBFAC,,, | Performed by: FAMILY MEDICINE

## 2020-02-11 PROCEDURE — 99213 OFFICE O/P EST LOW 20 MIN: CPT | Mod: PBBFAC,PO | Performed by: FAMILY MEDICINE

## 2020-02-11 PROCEDURE — 84443 ASSAY THYROID STIM HORMONE: CPT

## 2020-02-11 PROCEDURE — 99214 PR OFFICE/OUTPT VISIT, EST, LEVL IV, 30-39 MIN: ICD-10-PCS | Mod: S$PBB,,, | Performed by: FAMILY MEDICINE

## 2020-02-11 PROCEDURE — 99214 OFFICE O/P EST MOD 30 MIN: CPT | Mod: S$PBB,,, | Performed by: FAMILY MEDICINE

## 2020-02-11 PROCEDURE — 80053 COMPREHEN METABOLIC PANEL: CPT

## 2020-02-11 PROCEDURE — 99999 PR PBB SHADOW E&M-EST. PATIENT-LVL III: CPT | Mod: PBBFAC,,, | Performed by: FAMILY MEDICINE

## 2020-02-11 PROCEDURE — 84439 ASSAY OF FREE THYROXINE: CPT

## 2020-02-11 PROCEDURE — 80061 LIPID PANEL: CPT

## 2020-02-11 RX ORDER — IBUPROFEN 800 MG/1
800 TABLET ORAL 3 TIMES DAILY
Qty: 60 TABLET | Refills: 5 | Status: SHIPPED | OUTPATIENT
Start: 2020-02-11 | End: 2020-02-11 | Stop reason: SDUPTHER

## 2020-02-11 RX ORDER — ATORVASTATIN CALCIUM 40 MG/1
40 TABLET, FILM COATED ORAL DAILY
Qty: 90 TABLET | Refills: 3 | Status: SHIPPED | OUTPATIENT
Start: 2020-02-11 | End: 2021-04-21

## 2020-02-11 RX ORDER — ZOLPIDEM TARTRATE 10 MG/1
10 TABLET ORAL NIGHTLY PRN
Qty: 30 TABLET | Refills: 5 | Status: SHIPPED | OUTPATIENT
Start: 2020-02-11 | End: 2020-08-17 | Stop reason: SDUPTHER

## 2020-02-11 RX ORDER — TRAMADOL HYDROCHLORIDE 50 MG/1
50 TABLET ORAL EVERY 6 HOURS
Qty: 45 TABLET | Refills: 2 | Status: SHIPPED | OUTPATIENT
Start: 2020-02-11 | End: 2020-04-15 | Stop reason: SDUPTHER

## 2020-02-11 RX ORDER — IBUPROFEN 800 MG/1
800 TABLET ORAL 3 TIMES DAILY
Qty: 60 TABLET | Refills: 5 | Status: SHIPPED | OUTPATIENT
Start: 2020-02-11 | End: 2020-08-24

## 2020-02-11 RX ORDER — IBUPROFEN 800 MG/1
800 TABLET ORAL 3 TIMES DAILY
COMMUNITY
End: 2020-02-11 | Stop reason: SDUPTHER

## 2020-02-11 RX ORDER — TRAMADOL HYDROCHLORIDE 50 MG/1
50 TABLET ORAL EVERY 6 HOURS
Qty: 45 TABLET | Refills: 2 | Status: SHIPPED | OUTPATIENT
Start: 2020-02-11 | End: 2020-02-11 | Stop reason: SDUPTHER

## 2020-02-11 RX ORDER — ZOLPIDEM TARTRATE 10 MG/1
10 TABLET ORAL NIGHTLY PRN
Qty: 30 TABLET | Refills: 5 | Status: SHIPPED | OUTPATIENT
Start: 2020-02-11 | End: 2020-02-11 | Stop reason: SDUPTHER

## 2020-02-11 NOTE — PROGRESS NOTES
Subjective:       Patient ID: Slime Bradshaw is a 39 y.o. male.    Chief Complaint: Lump on testicle and Back Pain    39 year old male with a lump on his testicle for a year. It hasn't increased in size. It is not painful. He denies blood in his urine or ejaculate. He denies dysuria.     He states he still has lower back pain. He states it has been stiffening.  He was seen Dr. Poole, Pain medicine,  and was discharged from the clinic. He has received some steroid injections, physical therapy, and pain medication with tramadol. 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 He was in the  for 3-4 years. He was in the  at the age of [age].He has had a left L5-S1 TF GIANNA on 5/21/18 by interventional radiology.    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     History reviewed. No pertinent past medical history.   Past Surgical History:   Procedure Laterality Date    KNEE ARTHROSCOPY W/ ACL RECONSTRUCTION      right    WISDOM TOOTH EXTRACTION       Family History   Problem Relation Age of Onset    Cancer Father         throat-- smoker    Hypertension Mother     Diabetes Mother     Hypertension Brother     Prostate cancer Neg Hx      Social History     Socioeconomic History    Marital status:      Spouse name: Not on file    Number of children: Not on file    Years of education: Not on file    Highest education level: Not on file   Occupational History    Not on file   Social Needs    Financial resource strain: Not on file    Food insecurity:     Worry: Not on file     Inability: Not on file    Transportation needs:     Medical: Not on file     Non-medical: Not on file   Tobacco Use    Smoking status: Never Smoker   Substance and Sexual Activity    Alcohol use: Not Currently     Alcohol/week: 0.0 - 1.7 standard drinks    Drug use: No    Sexual  "activity: Yes     Partners: Female   Lifestyle    Physical activity:     Days per week: Not on file     Minutes per session: Not on file    Stress: Not on file   Relationships    Social connections:     Talks on phone: Not on file     Gets together: Not on file     Attends Judaism service: Not on file     Active member of club or organization: Not on file     Attends meetings of clubs or organizations: Not on file     Relationship status: Not on file   Other Topics Concern    Not on file   Social History Narrative    Works as a  for [employer]       Review of Systems    Objective:       Vitals:    02/11/20 0810   BP: 128/84   Pulse: 60   Temp: 97.7 °F (36.5 °C)   TempSrc: Oral   SpO2: 97%   Weight: 106 kg (233 lb 11 oz)   Height: 6' 2" (1.88 m)       Physical Exam   Constitutional: He appears well-developed and well-nourished. No distress.   HENT:   Head: Normocephalic and atraumatic.   Genitourinary: Right testis shows no mass, no swelling and no tenderness. Right testis is descended. Cremasteric reflex is not absent on the right side. Left testis shows no mass, no swelling and no tenderness. Left testis is descended. Cremasteric reflex is not absent on the left side.   Musculoskeletal:        Lumbar back: He exhibits decreased range of motion, tenderness, swelling, pain and spasm. He exhibits no bony tenderness, no edema, no deformity and no laceration.   Skin: He is not diaphoretic.       Assessment:       1. Hyperlipidemia, unspecified hyperlipidemia type    2. Insomnia, unspecified type    3. Chronic midline low back pain without sciatica    4. Testicular exam        Plan:       Slime was seen today for lump on testicle and back pain.    Diagnoses and all orders for this visit:    Hyperlipidemia, unspecified hyperlipidemia type  -     Comprehensive metabolic panel; Future  -     Lipid panel; Future  -     TSH; Future  Due for labs  Insomnia, unspecified type  Stable. Refilled meds.     - "     zolpidem (AMBIEN) 10 mg Tab; Take 1 tablet (10 mg total) by mouth nightly as needed.    Chronic midline low back pain without sciatica  -     traMADol (ULTRAM) 50 mg tablet; Take 1 tablet (50 mg total) by mouth every 6 (six) hours.  -     ibuprofen (ADVIL,MOTRIN) 800 MG tablet; Take 1 tablet (800 mg total) by mouth 3 (three) times daily.  Stable. Refilled meds.       Testicular exam  No abnormality noted int he right testicle

## 2020-02-24 ENCOUNTER — TELEPHONE (OUTPATIENT)
Dept: FAMILY MEDICINE | Facility: CLINIC | Age: 40
End: 2020-02-24

## 2020-02-24 NOTE — TELEPHONE ENCOUNTER
----- Message from Jean Olivera sent at 2/24/2020 11:15 AM CST -----  Contact: Self: [phone]  Type: Patient Call Back    Who called:[place]    What is the request in detail:Called regarding her  being in pain, please call to advise    Can the clinic reply by [facility]? NO    Would the patient rather a call back or a response via [facility]?    Best call back number:[phone]

## 2020-02-24 NOTE — TELEPHONE ENCOUNTER
"Return call to Pt, spoke with his wife-Liliane, who stated that the Pt was informed by the Provider to call back and schedule appointment if he didn't improve. Pt scheduled for appointment on 3-2-2020 with Provider. She stated that he can't wait til Monday, he is in excruciating pain. That I checked and she has no same day appointments available. That I can try and see if he can be seen at another clinic if they have a opening. She stated, "No, that she is his Doctor". I informed her that if he is in excruciating pain that he can go to his nearest UC or ED facility for evaluation and treatment. She acknowledged understanding.   "

## 2020-02-24 NOTE — TELEPHONE ENCOUNTER
Spoke with pts wife, states that she was trying to get Dr. Lozoya office. Given the right number to [facility], verbalizes understanding.

## 2020-03-06 ENCOUNTER — OFFICE VISIT (OUTPATIENT)
Dept: FAMILY MEDICINE | Facility: CLINIC | Age: 40
End: 2020-03-06
Payer: MEDICAID

## 2020-03-06 VITALS
DIASTOLIC BLOOD PRESSURE: 84 MMHG | OXYGEN SATURATION: 96 % | HEART RATE: 66 BPM | HEIGHT: 74 IN | SYSTOLIC BLOOD PRESSURE: 118 MMHG | TEMPERATURE: 98 F | BODY MASS INDEX: 30.27 KG/M2 | WEIGHT: 235.88 LBS

## 2020-03-06 DIAGNOSIS — M77.01 GOLFER'S ELBOW, RIGHT: Primary | ICD-10-CM

## 2020-03-06 PROCEDURE — 20605 DRAIN/INJ JOINT/BURSA W/O US: CPT | Mod: PBBFAC,PO,RT | Performed by: FAMILY MEDICINE

## 2020-03-06 PROCEDURE — 20605 PR DRAIN/INJECT INTERMEDIATE JOINT/BURSA: ICD-10-PCS | Mod: S$PBB,RT,, | Performed by: FAMILY MEDICINE

## 2020-03-06 PROCEDURE — 20605 DRAIN/INJ JOINT/BURSA W/O US: CPT | Mod: S$PBB,RT,, | Performed by: FAMILY MEDICINE

## 2020-03-06 PROCEDURE — 99214 OFFICE O/P EST MOD 30 MIN: CPT | Mod: S$PBB,25,, | Performed by: FAMILY MEDICINE

## 2020-03-06 PROCEDURE — 99213 OFFICE O/P EST LOW 20 MIN: CPT | Mod: PBBFAC,PO | Performed by: FAMILY MEDICINE

## 2020-03-06 PROCEDURE — 99999 PR PBB SHADOW E&M-EST. PATIENT-LVL III: CPT | Mod: PBBFAC,,, | Performed by: FAMILY MEDICINE

## 2020-03-06 PROCEDURE — 99999 PR PBB SHADOW E&M-EST. PATIENT-LVL III: ICD-10-PCS | Mod: PBBFAC,,, | Performed by: FAMILY MEDICINE

## 2020-03-06 PROCEDURE — 99214 PR OFFICE/OUTPT VISIT, EST, LEVL IV, 30-39 MIN: ICD-10-PCS | Mod: S$PBB,25,, | Performed by: FAMILY MEDICINE

## 2020-03-06 RX ORDER — METHYLPREDNISOLONE ACETATE 40 MG/ML
40 INJECTION, SUSPENSION INTRA-ARTICULAR; INTRALESIONAL; INTRAMUSCULAR; SOFT TISSUE
Status: COMPLETED | OUTPATIENT
Start: 2020-03-06 | End: 2020-03-06

## 2020-03-06 RX ADMIN — METHYLPREDNISOLONE ACETATE 40 MG: 40 INJECTION, SUSPENSION INTRA-ARTICULAR; INTRALESIONAL; INTRAMUSCULAR; SOFT TISSUE at 09:03

## 2020-03-06 NOTE — PROGRESS NOTES
"Subjective:       Patient ID: Slimegerard Bradshaw is a 39 y.o. male.    Chief Complaint: Right Arm/Hand Pain    39 year old female presents for follow up on right shoulder pain. He went to urgent care and was given toradol. He states it raised his blood pressure. He was also given vicodin. He was given a steroid shot in his buttock. He still has pain in his shoulder, but it is not as bad.    He still has pain in his right hand. He states his fingertips feel numb. He is putting icy hot patches on his arm. He has been on prednisone and he states he is aggressive on this medication.         Review of Systems    Objective:       Vitals:    03/06/20 0840   BP: 118/84   Pulse: 66   Temp: 97.7 °F (36.5 °C)   TempSrc: Oral   SpO2: 96%   Weight: 107 kg (235 lb 14.3 oz)   Height: 6' 2" (1.88 m)       Physical Exam   Constitutional: He appears well-developed and well-nourished. No distress.   HENT:   Head: Normocephalic and atraumatic.   Musculoskeletal:        Right shoulder: Normal. He exhibits normal range of motion, no tenderness, no bony tenderness, no swelling, no effusion, no crepitus, no deformity, no laceration, no pain, no spasm and normal pulse.        Left shoulder: He exhibits normal range of motion, no tenderness, no bony tenderness, no swelling, no effusion, no crepitus, no deformity, no laceration, no pain, no spasm, normal pulse and normal strength.        Right elbow: He exhibits swelling. He exhibits normal range of motion, no effusion, no deformity and no laceration. Tenderness found. Medial epicondyle tenderness noted. No lateral epicondyle and no olecranon process tenderness noted.   Skin: He is not diaphoretic.     Date: 3/6/2020  Time:9:06 AM  Name of the procedure being done: ELBOW INJETION  Indications: ELBOW PAIN AND LIMITED RANGE OF MOTION  Patient consent:  Patient was given informed consent. Informed of risks associated with this, which include bleeding, infection, and pain. Patient advised that " oral anti-inflammatories are another option other than an injection. He has had the procedure before and was successful so would like to proceed with this. Pertinent Lab Values: N/A  Type of Anesthesia used: 1% lidocaine with epinephrine  Description of the procedure: Using sterile prep, local anesthesia with 40mg of depomedrol was injected into the lateral aspect of the elbow joint in a fan pattern. A bandage was applied after  Complications:none  Estimated blood loss: none  Disposition: Pt tolerated the procedure well    Assessment:       1. Golfer's elbow, right        Plan:       Slime was seen today for right arm/hand pain.    Diagnoses and all orders for this visit:    Golfer's elbow, right  -     methylPREDNISolone acetate injection 40 mg

## 2020-04-15 ENCOUNTER — PATIENT MESSAGE (OUTPATIENT)
Dept: FAMILY MEDICINE | Facility: CLINIC | Age: 40
End: 2020-04-15

## 2020-04-15 DIAGNOSIS — M54.50 CHRONIC MIDLINE LOW BACK PAIN WITHOUT SCIATICA: ICD-10-CM

## 2020-04-15 DIAGNOSIS — G89.29 CHRONIC MIDLINE LOW BACK PAIN WITHOUT SCIATICA: ICD-10-CM

## 2020-04-15 RX ORDER — TRAMADOL HYDROCHLORIDE 50 MG/1
50 TABLET ORAL EVERY 6 HOURS
Qty: 45 TABLET | Refills: 2 | Status: SHIPPED | OUTPATIENT
Start: 2020-04-15 | End: 2020-06-16 | Stop reason: SDUPTHER

## 2020-06-16 ENCOUNTER — OFFICE VISIT (OUTPATIENT)
Dept: FAMILY MEDICINE | Facility: CLINIC | Age: 40
End: 2020-06-16
Payer: MEDICAID

## 2020-06-16 VITALS
WEIGHT: 229.25 LBS | BODY MASS INDEX: 29.44 KG/M2 | TEMPERATURE: 98 F | RESPIRATION RATE: 18 BRPM | SYSTOLIC BLOOD PRESSURE: 126 MMHG | HEART RATE: 64 BPM | OXYGEN SATURATION: 98 % | DIASTOLIC BLOOD PRESSURE: 78 MMHG

## 2020-06-16 DIAGNOSIS — M79.671 RIGHT FOOT PAIN: ICD-10-CM

## 2020-06-16 DIAGNOSIS — M54.50 CHRONIC MIDLINE LOW BACK PAIN WITHOUT SCIATICA: ICD-10-CM

## 2020-06-16 DIAGNOSIS — G89.29 CHRONIC MIDLINE LOW BACK PAIN WITHOUT SCIATICA: ICD-10-CM

## 2020-06-16 DIAGNOSIS — F39 MOOD DISORDER: Primary | ICD-10-CM

## 2020-06-16 PROCEDURE — 99214 OFFICE O/P EST MOD 30 MIN: CPT | Mod: S$PBB,,, | Performed by: FAMILY MEDICINE

## 2020-06-16 PROCEDURE — 99999 PR PBB SHADOW E&M-EST. PATIENT-LVL III: CPT | Mod: PBBFAC,,, | Performed by: FAMILY MEDICINE

## 2020-06-16 PROCEDURE — 99999 PR PBB SHADOW E&M-EST. PATIENT-LVL III: ICD-10-PCS | Mod: PBBFAC,,, | Performed by: FAMILY MEDICINE

## 2020-06-16 PROCEDURE — 99213 OFFICE O/P EST LOW 20 MIN: CPT | Mod: PBBFAC,PO | Performed by: FAMILY MEDICINE

## 2020-06-16 PROCEDURE — 99214 PR OFFICE/OUTPT VISIT, EST, LEVL IV, 30-39 MIN: ICD-10-PCS | Mod: S$PBB,,, | Performed by: FAMILY MEDICINE

## 2020-06-16 RX ORDER — TRAMADOL HYDROCHLORIDE 50 MG/1
50 TABLET ORAL EVERY 6 HOURS
Qty: 45 TABLET | Refills: 2 | Status: SHIPPED | OUTPATIENT
Start: 2020-06-16 | End: 2020-08-17 | Stop reason: SDUPTHER

## 2020-06-16 RX ORDER — BUPROPION HYDROCHLORIDE 75 MG/1
75 TABLET ORAL 2 TIMES DAILY
Qty: 60 TABLET | Refills: 11 | Status: SHIPPED | OUTPATIENT
Start: 2020-06-16 | End: 2020-08-17 | Stop reason: SDUPTHER

## 2020-06-16 NOTE — PROGRESS NOTES
Subjective:       Patient ID: Slime Bradshaw is a 39 y.o. male.    Chief Complaint: No chief complaint on file.    39 year-old male presents with a knot on his foot. It has been there for the last 5 years. He states it happened during kick boxing when someone's elbow landed on top of his foot. It recently has been swelling up again .He denies recent trauma. He denies any  new exercises that may have irritated it.     He still has his chronic lower back pain. He states it is 6/10 pain. He is on tramadol and ibuprofen.     He has right elbow pain again.He states it has been better. 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       He admits to anxiety, but states prozac made him irritable. lexapro caused sexual side effects.   No past medical history on file.   Past Surgical History:  No date: KNEE ARTHROSCOPY W/ ACL RECONSTRUCTION      Comment:  right  No date: WISDOM TOOTH EXTRACTION  Review of patient's family history indicates:  Problem: Cancer      Relation: Father          Age of Onset: (Not Specified)          Comment: throat-- smoker  Problem: Hypertension      Relation: Mother          Age of Onset: (Not Specified)  Problem: Diabetes      Relation: Mother          Age of Onset: (Not Specified)  Problem: Hypertension      Relation: Brother          Age of Onset: (Not Specified)  Problem: Prostate cancer      Relation: Neg Hx          Age of Onset: (Not Specified)    Social History    Socioeconomic History      Marital status:       Spouse name: Not on file      Number of children: Not on file      Years of education: Not on file      Highest education level: Not on file    Occupational History      Not on file    Social Needs      Financial resource strain: Not hard at all      Food insecurity        Worry: Never true        Inability: Never true      Transportation needs        Medical: No        Non-medical: No    Tobacco Use      Smoking status: Never Smoker    Substance and Sexual  Activity      Alcohol use: Not Currently        Alcohol/week: 0.0 - 1.7 standard drinks        Frequency: Never        Drinks per session: Patient refused        Binge frequency: Never      Drug use: No      Sexual activity: Yes        Partners: Female    Lifestyle      Physical activity        Days per week: 2 days        Minutes per session: 60 min      Stress: To some extent    Relationships      Social connections        Talks on phone: More than three times a week        Gets together: Once a week        Attends Confucianism service: Not on file        Active member of club or organization: No        Attends meetings of clubs or organizations: Never        Relationship status:     Other Topics      Concerns:        Not on file    Social History Narrative      Works as a  for [employer]        Review of Systems   Constitutional: Negative for activity change and unexpected weight change.   HENT: Negative for hearing loss, rhinorrhea and trouble swallowing.    Eyes: Negative for discharge and visual disturbance.   Respiratory: Negative for chest tightness and wheezing.    Cardiovascular: Negative for chest pain and palpitations.   Gastrointestinal: Negative for blood in stool, constipation, diarrhea and vomiting.   Endocrine: Negative for polydipsia and polyuria.   Genitourinary: Negative for difficulty urinating, hematuria and urgency.   Musculoskeletal: Negative for arthralgias, joint swelling and neck pain.   Neurological: Negative for weakness and headaches.   Psychiatric/Behavioral: Negative for confusion and dysphoric mood.         Objective:      Physical Exam  Musculoskeletal:      Lumbar back: He exhibits decreased range of motion, tenderness and pain. He exhibits no bony tenderness, no swelling, no edema, no deformity, no laceration, no spasm and normal pulse.        Feet:    Neurological:      Mental Status: He is alert.         Assessment:       1. Mood disorder    2. Chronic  midline low back pain without sciatica    3. Right foot pain        Plan:       Diagnoses and all orders for this visit:    Mood disorder  -     buPROPion (WELLBUTRIN) 75 MG tablet; Take 1 tablet (75 mg total) by mouth 2 (two) times daily.  Restarting wellbutrin due to stressors. He has a new business. His wife is sick right now as well    Chronic midline low back pain without sciatica  -     traMADoL (ULTRAM) 50 mg tablet; Take 1 tablet (50 mg total) by mouth every 6 (six) hours.  Stable. Refilled meds.     Right foot pain  Bony protrusion is likely due to an old fracture vs. A bone spur. NSAID'S PRN PAIN. Declines surgical interventions.

## 2020-06-16 NOTE — PROGRESS NOTES
Answers for HPI/ROS submitted by the patient on 6/16/2020   activity change: No  unexpected weight change: No  neck pain: No  hearing loss: No  rhinorrhea: No  trouble swallowing: No  eye discharge: No  visual disturbance: No  chest tightness: No  wheezing: No  chest pain: No  palpitations: No  blood in stool: No  constipation: No  vomiting: No  diarrhea: No  polydipsia: No  polyuria: No  difficulty urinating: No  urgency: No  hematuria: No  joint swelling: No  arthralgias: No  headaches: No  weakness: No  confusion: No  dysphoric mood: No

## 2020-08-14 DIAGNOSIS — Z11.59 NEED FOR HEPATITIS C SCREENING TEST: ICD-10-CM

## 2020-08-17 ENCOUNTER — OFFICE VISIT (OUTPATIENT)
Dept: FAMILY MEDICINE | Facility: CLINIC | Age: 40
End: 2020-08-17
Payer: MEDICAID

## 2020-08-17 VITALS
HEIGHT: 74 IN | SYSTOLIC BLOOD PRESSURE: 116 MMHG | BODY MASS INDEX: 29.14 KG/M2 | TEMPERATURE: 98 F | DIASTOLIC BLOOD PRESSURE: 82 MMHG | OXYGEN SATURATION: 96 % | HEART RATE: 75 BPM | WEIGHT: 227.06 LBS

## 2020-08-17 DIAGNOSIS — M54.50 CHRONIC MIDLINE LOW BACK PAIN WITHOUT SCIATICA: ICD-10-CM

## 2020-08-17 DIAGNOSIS — F39 MOOD DISORDER: ICD-10-CM

## 2020-08-17 DIAGNOSIS — G47.00 INSOMNIA, UNSPECIFIED TYPE: ICD-10-CM

## 2020-08-17 DIAGNOSIS — G89.29 CHRONIC MIDLINE LOW BACK PAIN WITHOUT SCIATICA: ICD-10-CM

## 2020-08-17 PROCEDURE — 99214 OFFICE O/P EST MOD 30 MIN: CPT | Mod: S$PBB,,, | Performed by: FAMILY MEDICINE

## 2020-08-17 PROCEDURE — 99214 PR OFFICE/OUTPT VISIT, EST, LEVL IV, 30-39 MIN: ICD-10-PCS | Mod: S$PBB,,, | Performed by: FAMILY MEDICINE

## 2020-08-17 PROCEDURE — 90471 IMMUNIZATION ADMIN: CPT | Mod: PBBFAC,PO

## 2020-08-17 PROCEDURE — 99999 PR PBB SHADOW E&M-EST. PATIENT-LVL III: CPT | Mod: PBBFAC,,, | Performed by: FAMILY MEDICINE

## 2020-08-17 PROCEDURE — 99213 OFFICE O/P EST LOW 20 MIN: CPT | Mod: PBBFAC,PO,25 | Performed by: FAMILY MEDICINE

## 2020-08-17 PROCEDURE — 99999 PR PBB SHADOW E&M-EST. PATIENT-LVL III: ICD-10-PCS | Mod: PBBFAC,,, | Performed by: FAMILY MEDICINE

## 2020-08-17 RX ORDER — ZOLPIDEM TARTRATE 10 MG/1
10 TABLET ORAL NIGHTLY PRN
Qty: 30 TABLET | Refills: 5 | Status: SHIPPED | OUTPATIENT
Start: 2020-08-17 | End: 2020-08-24

## 2020-08-17 RX ORDER — BUPROPION HYDROCHLORIDE 75 MG/1
75 TABLET ORAL 2 TIMES DAILY
Qty: 60 TABLET | Refills: 11 | Status: SHIPPED | OUTPATIENT
Start: 2020-08-17 | End: 2021-04-21

## 2020-08-17 RX ORDER — TRAMADOL HYDROCHLORIDE 50 MG/1
50 TABLET ORAL EVERY 6 HOURS
Qty: 45 TABLET | Refills: 2 | Status: SHIPPED | OUTPATIENT
Start: 2020-08-17 | End: 2020-08-24

## 2020-08-17 RX ORDER — TRAMADOL HYDROCHLORIDE 50 MG/1
50 TABLET ORAL EVERY 6 HOURS
Qty: 45 TABLET | Refills: 2 | Status: SHIPPED | OUTPATIENT
Start: 2020-08-17 | End: 2020-08-17 | Stop reason: SDUPTHER

## 2020-08-17 RX ORDER — ZOLPIDEM TARTRATE 10 MG/1
10 TABLET ORAL NIGHTLY PRN
Qty: 30 TABLET | Refills: 5 | Status: SHIPPED | OUTPATIENT
Start: 2020-08-17 | End: 2020-08-17 | Stop reason: SDUPTHER

## 2020-08-17 RX ORDER — KETOROLAC TROMETHAMINE 30 MG/ML
60 INJECTION, SOLUTION INTRAMUSCULAR; INTRAVENOUS
Status: COMPLETED | OUTPATIENT
Start: 2020-08-17 | End: 2020-08-17

## 2020-08-17 RX ORDER — BUPROPION HYDROCHLORIDE 75 MG/1
75 TABLET ORAL 2 TIMES DAILY
Qty: 60 TABLET | Refills: 11 | Status: SHIPPED | OUTPATIENT
Start: 2020-08-17 | End: 2020-08-17 | Stop reason: SDUPTHER

## 2020-08-17 RX ADMIN — KETOROLAC TROMETHAMINE 60 MG: 60 INJECTION, SOLUTION INTRAMUSCULAR at 02:08

## 2020-08-17 NOTE — PROGRESS NOTES
Subjective:       Patient ID: Slime Bradshaw is a 39 y.o. male.    Chief Complaint: Follow Up/ Medications    39 year-old male presents for follow up. He would like a gardasil vaccine.    He is taking his wellbutrin. He states he is doing better on this and he sates he is less irritable on this. He takes both pills in the morning. He is stressed out as his wife is battling thyroid cancer right now.     He is on ambien for sleep. He is doing well on his medication for sleep.     He still has lower back pain that is worse on the left side. He states it stays in his lower back. He has some cramping in his left hip, but is not sure it it is related. He is not working out at the gym. He is doing resistance bands at home.     History reviewed. No pertinent past medical history.   Past Surgical History:   Procedure Laterality Date    KNEE ARTHROSCOPY W/ ACL RECONSTRUCTION      right    WISDOM TOOTH EXTRACTION       Family History   Problem Relation Age of Onset    Cancer Father         throat-- smoker    Hypertension Mother     Diabetes Mother     Hypertension Brother     Prostate cancer Neg Hx      Social History     Socioeconomic History    Marital status:      Spouse name: Not on file    Number of children: Not on file    Years of education: Not on file    Highest education level: Not on file   Occupational History    Not on file   Social Needs    Financial resource strain: Not hard at all    Food insecurity     Worry: Never true     Inability: Never true    Transportation needs     Medical: No     Non-medical: No   Tobacco Use    Smoking status: Never Smoker   Substance and Sexual Activity    Alcohol use: Not Currently     Alcohol/week: 0.0 - 1.7 standard drinks     Frequency: Never     Drinks per session: Patient refused     Binge frequency: Never    Drug use: No    Sexual activity: Yes     Partners: Female   Lifestyle    Physical activity     Days per week: 2 days     Minutes per  "session: 60 min    Stress: To some extent   Relationships    Social connections     Talks on phone: More than three times a week     Gets together: Once a week     Attends Mu-ism service: Not on file     Active member of club or organization: No     Attends meetings of clubs or organizations: Never     Relationship status:    Other Topics Concern    Not on file   Social History Narrative    Works as a  for [employer]       Review of Systems      Objective:       Vitals:    08/17/20 1347   BP: 116/82   Pulse: 75   Temp: 98.2 °F (36.8 °C)   TempSrc: Oral   SpO2: 96%   Weight: 103 kg (227 lb 1.2 oz)   Height: 6' 2" (1.88 m)       Physical Exam  Constitutional:       General: He is not in acute distress.     Appearance: Normal appearance. He is well-developed. He is not diaphoretic.   HENT:      Head: Normocephalic and atraumatic.   Eyes:      Conjunctiva/sclera: Conjunctivae normal.   Neck:      Musculoskeletal: Normal range of motion and neck supple.   Cardiovascular:      Rate and Rhythm: Normal rate and regular rhythm.      Heart sounds: Normal heart sounds. No murmur. No friction rub. No gallop.    Pulmonary:      Effort: Pulmonary effort is normal. No respiratory distress.      Breath sounds: Normal breath sounds. No stridor. No wheezing, rhonchi or rales.   Chest:      Chest wall: No tenderness.   Musculoskeletal:      Lumbar back: He exhibits tenderness. He exhibits normal range of motion, no bony tenderness, no swelling, no edema, no deformity, no laceration, no pain and no spasm.   Neurological:      Mental Status: He is alert and oriented to person, place, and time.         Assessment:       1. Chronic midline low back pain without sciatica    2. Mood disorder    3. Insomnia, unspecified type        Plan:       Slime was seen today for follow up/ medications.    Diagnoses and all orders for this visit:    Chronic midline low back pain without sciatica  -     ketorolac injection " 60 mg  -     traMADoL (ULTRAM) 50 mg tablet; Take 1 tablet (50 mg total) by mouth every 6 (six) hours.  Stable. Refilled meds.     Mood disorder  -     buPROPion (WELLBUTRIN) 75 MG tablet; Take 1 tablet (75 mg total) by mouth 2 (two) times daily.  Stable. Refilled meds.     Insomnia, unspecified type  -     zolpidem (AMBIEN) 10 mg Tab; Take 1 tablet (10 mg total) by mouth nightly as needed.  Stable. Refilled meds.     Other orders  -     (In Office Administered) HPV Vaccine (9-Valent) (3 Dose) (IM)

## 2020-08-17 NOTE — PROGRESS NOTES
Administered Ketorolac 60 mg IM to leftt upper outer quad gluteus.  Patient tolerated injection well, no adverse reactions noted.   Administered HPV vaccine dose #1 IM to right deltoid.  Patient tolerated injection well, no adverse reactions noted.  Patient advised to return for next injection.

## 2020-08-21 ENCOUNTER — HOSPITAL ENCOUNTER (OUTPATIENT)
Facility: OTHER | Age: 40
Discharge: HOME | End: 2020-08-24
Attending: EMERGENCY MEDICINE | Admitting: EMERGENCY MEDICINE
Payer: MEDICAID

## 2020-08-21 DIAGNOSIS — R10.9 ABDOMINAL CRAMPING: ICD-10-CM

## 2020-08-21 DIAGNOSIS — N17.9 AKI (ACUTE KIDNEY INJURY): Primary | ICD-10-CM

## 2020-08-21 LAB
ALBUMIN SERPL BCP-MCNC: 3.7 G/DL (ref 3.5–5.2)
ALP SERPL-CCNC: 55 U/L (ref 55–135)
ALT SERPL W/O P-5'-P-CCNC: 22 U/L (ref 10–44)
ANION GAP SERPL CALC-SCNC: 9 MMOL/L (ref 8–16)
AST SERPL-CCNC: 23 U/L (ref 10–40)
BASOPHILS # BLD AUTO: 0.01 K/UL (ref 0–0.2)
BASOPHILS NFR BLD: 0.2 % (ref 0–1.9)
BILIRUB DIRECT SERPL-MCNC: 0.2 MG/DL (ref 0.1–0.3)
BILIRUB SERPL-MCNC: 0.6 MG/DL (ref 0.1–1)
BILIRUB UR QL STRIP: NEGATIVE
BUN SERPL-MCNC: 16 MG/DL (ref 6–20)
CALCIUM SERPL-MCNC: 9.1 MG/DL (ref 8.7–10.5)
CHLORIDE SERPL-SCNC: 105 MMOL/L (ref 95–110)
CLARITY UR: CLEAR
CO2 SERPL-SCNC: 27 MMOL/L (ref 23–29)
COLOR UR: YELLOW
CREAT SERPL-MCNC: 2.1 MG/DL (ref 0.5–1.4)
DIFFERENTIAL METHOD: ABNORMAL
EOSINOPHIL # BLD AUTO: 0.1 K/UL (ref 0–0.5)
EOSINOPHIL NFR BLD: 2.3 % (ref 0–8)
ERYTHROCYTE [DISTWIDTH] IN BLOOD BY AUTOMATED COUNT: 11.4 % (ref 11.5–14.5)
EST. GFR  (AFRICAN AMERICAN): 44 ML/MIN/1.73 M^2
EST. GFR  (NON AFRICAN AMERICAN): 38 ML/MIN/1.73 M^2
GLUCOSE SERPL-MCNC: 102 MG/DL (ref 70–110)
GLUCOSE UR QL STRIP: NEGATIVE
HCT VFR BLD AUTO: 42.7 % (ref 40–54)
HGB BLD-MCNC: 14.5 G/DL (ref 14–18)
HGB UR QL STRIP: ABNORMAL
IMM GRANULOCYTES # BLD AUTO: 0.01 K/UL (ref 0–0.04)
IMM GRANULOCYTES NFR BLD AUTO: 0.2 % (ref 0–0.5)
KETONES UR QL STRIP: NEGATIVE
LEUKOCYTE ESTERASE UR QL STRIP: NEGATIVE
LIPASE SERPL-CCNC: 15 U/L (ref 4–60)
LYMPHOCYTES # BLD AUTO: 1.3 K/UL (ref 1–4.8)
LYMPHOCYTES NFR BLD: 23.5 % (ref 18–48)
MCH RBC QN AUTO: 29.7 PG (ref 27–31)
MCHC RBC AUTO-ENTMCNC: 34 G/DL (ref 32–36)
MCV RBC AUTO: 88 FL (ref 82–98)
MONOCYTES # BLD AUTO: 0.7 K/UL (ref 0.3–1)
MONOCYTES NFR BLD: 12 % (ref 4–15)
NEUTROPHILS # BLD AUTO: 3.4 K/UL (ref 1.8–7.7)
NEUTROPHILS NFR BLD: 61.8 % (ref 38–73)
NITRITE UR QL STRIP: NEGATIVE
NRBC BLD-RTO: 0 /100 WBC
PH UR STRIP: 7 [PH] (ref 5–8)
PLATELET # BLD AUTO: 252 K/UL (ref 150–350)
PMV BLD AUTO: 8.7 FL (ref 9.2–12.9)
POTASSIUM SERPL-SCNC: 4.1 MMOL/L (ref 3.5–5.1)
PROT SERPL-MCNC: 7.2 G/DL (ref 6–8.4)
PROT UR QL STRIP: ABNORMAL
RBC # BLD AUTO: 4.88 M/UL (ref 4.6–6.2)
SODIUM SERPL-SCNC: 141 MMOL/L (ref 136–145)
SP GR UR STRIP: <=1.005 (ref 1–1.03)
URN SPEC COLLECT METH UR: ABNORMAL
UROBILINOGEN UR STRIP-ACNC: NEGATIVE EU/DL
WBC # BLD AUTO: 5.57 K/UL (ref 3.9–12.7)

## 2020-08-21 PROCEDURE — 96361 HYDRATE IV INFUSION ADD-ON: CPT

## 2020-08-21 PROCEDURE — 25000003 PHARM REV CODE 250: Performed by: EMERGENCY MEDICINE

## 2020-08-21 PROCEDURE — 81003 URINALYSIS AUTO W/O SCOPE: CPT

## 2020-08-21 PROCEDURE — 80048 BASIC METABOLIC PNL TOTAL CA: CPT

## 2020-08-21 PROCEDURE — 99285 EMERGENCY DEPT VISIT HI MDM: CPT | Mod: 25

## 2020-08-21 PROCEDURE — 96376 TX/PRO/DX INJ SAME DRUG ADON: CPT

## 2020-08-21 PROCEDURE — 83690 ASSAY OF LIPASE: CPT

## 2020-08-21 PROCEDURE — 96375 TX/PRO/DX INJ NEW DRUG ADDON: CPT

## 2020-08-21 PROCEDURE — 63600175 PHARM REV CODE 636 W HCPCS: Performed by: EMERGENCY MEDICINE

## 2020-08-21 PROCEDURE — 83935 ASSAY OF URINE OSMOLALITY: CPT

## 2020-08-21 PROCEDURE — 85025 COMPLETE CBC W/AUTO DIFF WBC: CPT

## 2020-08-21 PROCEDURE — 80076 HEPATIC FUNCTION PANEL: CPT

## 2020-08-21 PROCEDURE — 96374 THER/PROPH/DIAG INJ IV PUSH: CPT

## 2020-08-21 RX ORDER — HYDROMORPHONE HYDROCHLORIDE 1 MG/ML
1 INJECTION, SOLUTION INTRAMUSCULAR; INTRAVENOUS; SUBCUTANEOUS
Status: COMPLETED | OUTPATIENT
Start: 2020-08-21 | End: 2020-08-21

## 2020-08-21 RX ORDER — MORPHINE SULFATE 4 MG/ML
4 INJECTION, SOLUTION INTRAMUSCULAR; INTRAVENOUS
Status: COMPLETED | OUTPATIENT
Start: 2020-08-21 | End: 2020-08-21

## 2020-08-21 RX ORDER — ONDANSETRON 2 MG/ML
4 INJECTION INTRAMUSCULAR; INTRAVENOUS
Status: COMPLETED | OUTPATIENT
Start: 2020-08-21 | End: 2020-08-21

## 2020-08-21 RX ADMIN — MORPHINE SULFATE 4 MG: 4 INJECTION, SOLUTION INTRAMUSCULAR; INTRAVENOUS at 09:08

## 2020-08-21 RX ADMIN — ONDANSETRON 4 MG: 2 INJECTION INTRAMUSCULAR; INTRAVENOUS at 09:08

## 2020-08-21 RX ADMIN — HYDROMORPHONE HYDROCHLORIDE 1 MG: 1 INJECTION, SOLUTION INTRAMUSCULAR; INTRAVENOUS; SUBCUTANEOUS at 10:08

## 2020-08-21 RX ADMIN — SODIUM CHLORIDE 1000 ML: 0.9 INJECTION, SOLUTION INTRAVENOUS at 09:08

## 2020-08-21 RX ADMIN — HYDROMORPHONE HYDROCHLORIDE 1 MG: 1 INJECTION, SOLUTION INTRAMUSCULAR; INTRAVENOUS; SUBCUTANEOUS at 11:08

## 2020-08-22 PROBLEM — R10.30 LOWER ABDOMINAL PAIN: Status: ACTIVE | Noted: 2020-08-22

## 2020-08-22 PROBLEM — R10.9 ABDOMINAL CRAMPING: Status: ACTIVE | Noted: 2020-08-22

## 2020-08-22 PROBLEM — N17.9 AKI (ACUTE KIDNEY INJURY): Status: ACTIVE | Noted: 2020-08-22

## 2020-08-22 LAB
AMPHET+METHAMPHET UR QL: NEGATIVE
ANION GAP SERPL CALC-SCNC: 12 MMOL/L (ref 8–16)
ANION GAP SERPL CALC-SCNC: 9 MMOL/L (ref 8–16)
BARBITURATES UR QL SCN>200 NG/ML: NEGATIVE
BASOPHILS # BLD AUTO: 0.01 K/UL (ref 0–0.2)
BASOPHILS NFR BLD: 0.2 % (ref 0–1.9)
BENZODIAZ UR QL SCN>200 NG/ML: NEGATIVE
BUN SERPL-MCNC: 17 MG/DL (ref 6–20)
BUN SERPL-MCNC: 19 MG/DL (ref 6–20)
BZE UR QL SCN: NEGATIVE
CALCIUM SERPL-MCNC: 8.8 MG/DL (ref 8.7–10.5)
CALCIUM SERPL-MCNC: 8.8 MG/DL (ref 8.7–10.5)
CANNABINOIDS UR QL SCN: NEGATIVE
CHLORIDE SERPL-SCNC: 106 MMOL/L (ref 95–110)
CHLORIDE SERPL-SCNC: 107 MMOL/L (ref 95–110)
CK SERPL-CCNC: 116 U/L (ref 20–200)
CO2 SERPL-SCNC: 23 MMOL/L (ref 23–29)
CO2 SERPL-SCNC: 26 MMOL/L (ref 23–29)
CREAT SERPL-MCNC: 2.2 MG/DL (ref 0.5–1.4)
CREAT SERPL-MCNC: 2.3 MG/DL (ref 0.5–1.4)
CREAT UR-MCNC: 86.4 MG/DL (ref 23–375)
CREAT UR-MCNC: 86.4 MG/DL (ref 23–375)
DIFFERENTIAL METHOD: ABNORMAL
EOSINOPHIL # BLD AUTO: 0.1 K/UL (ref 0–0.5)
EOSINOPHIL NFR BLD: 1.6 % (ref 0–8)
ERYTHROCYTE [DISTWIDTH] IN BLOOD BY AUTOMATED COUNT: 11.4 % (ref 11.5–14.5)
EST. GFR  (AFRICAN AMERICAN): 40 ML/MIN/1.73 M^2
EST. GFR  (AFRICAN AMERICAN): 42 ML/MIN/1.73 M^2
EST. GFR  (NON AFRICAN AMERICAN): 34 ML/MIN/1.73 M^2
EST. GFR  (NON AFRICAN AMERICAN): 36 ML/MIN/1.73 M^2
GLUCOSE SERPL-MCNC: 105 MG/DL (ref 70–110)
GLUCOSE SERPL-MCNC: 90 MG/DL (ref 70–110)
HCT VFR BLD AUTO: 41.2 % (ref 40–54)
HGB BLD-MCNC: 13.9 G/DL (ref 14–18)
IMM GRANULOCYTES # BLD AUTO: 0.02 K/UL (ref 0–0.04)
IMM GRANULOCYTES NFR BLD AUTO: 0.4 % (ref 0–0.5)
LYMPHOCYTES # BLD AUTO: 1.2 K/UL (ref 1–4.8)
LYMPHOCYTES NFR BLD: 20.7 % (ref 18–48)
MCH RBC QN AUTO: 29.8 PG (ref 27–31)
MCHC RBC AUTO-ENTMCNC: 33.7 G/DL (ref 32–36)
MCV RBC AUTO: 88 FL (ref 82–98)
METHADONE UR QL SCN>300 NG/ML: NEGATIVE
MONOCYTES # BLD AUTO: 0.6 K/UL (ref 0.3–1)
MONOCYTES NFR BLD: 10.7 % (ref 4–15)
NEUTROPHILS # BLD AUTO: 3.8 K/UL (ref 1.8–7.7)
NEUTROPHILS NFR BLD: 66.4 % (ref 38–73)
NRBC BLD-RTO: 0 /100 WBC
OPIATES UR QL SCN: NEGATIVE
OSMOLALITY UR: 223 MOSM/KG (ref 50–1200)
PCP UR QL SCN>25 NG/ML: NEGATIVE
PLATELET # BLD AUTO: 263 K/UL (ref 150–350)
PMV BLD AUTO: 9.4 FL (ref 9.2–12.9)
POTASSIUM SERPL-SCNC: 3.7 MMOL/L (ref 3.5–5.1)
POTASSIUM SERPL-SCNC: 4.2 MMOL/L (ref 3.5–5.1)
PROT UR-MCNC: 38 MG/DL (ref 0–15)
PROT/CREAT UR: 0.44 MG/G{CREAT} (ref 0–0.2)
RBC # BLD AUTO: 4.66 M/UL (ref 4.6–6.2)
SARS-COV-2 RDRP RESP QL NAA+PROBE: NEGATIVE
SODIUM SERPL-SCNC: 141 MMOL/L (ref 136–145)
SODIUM SERPL-SCNC: 142 MMOL/L (ref 136–145)
SODIUM UR-SCNC: 38 MMOL/L (ref 20–250)
TOXICOLOGY INFORMATION: NORMAL
URATE SERPL-MCNC: 7.9 MG/DL (ref 3.4–7)
WBC # BLD AUTO: 5.69 K/UL (ref 3.9–12.7)

## 2020-08-22 PROCEDURE — 82550 ASSAY OF CK (CPK): CPT

## 2020-08-22 PROCEDURE — 25000003 PHARM REV CODE 250: Performed by: PHYSICIAN ASSISTANT

## 2020-08-22 PROCEDURE — 96375 TX/PRO/DX INJ NEW DRUG ADDON: CPT

## 2020-08-22 PROCEDURE — G0378 HOSPITAL OBSERVATION PER HR: HCPCS

## 2020-08-22 PROCEDURE — 96376 TX/PRO/DX INJ SAME DRUG ADON: CPT

## 2020-08-22 PROCEDURE — 63600175 PHARM REV CODE 636 W HCPCS: Performed by: PHYSICIAN ASSISTANT

## 2020-08-22 PROCEDURE — 63600175 PHARM REV CODE 636 W HCPCS: Performed by: EMERGENCY MEDICINE

## 2020-08-22 PROCEDURE — 85025 COMPLETE CBC W/AUTO DIFF WBC: CPT

## 2020-08-22 PROCEDURE — 96361 HYDRATE IV INFUSION ADD-ON: CPT

## 2020-08-22 PROCEDURE — 84300 ASSAY OF URINE SODIUM: CPT

## 2020-08-22 PROCEDURE — 80048 BASIC METABOLIC PNL TOTAL CA: CPT

## 2020-08-22 PROCEDURE — 36415 COLL VENOUS BLD VENIPUNCTURE: CPT

## 2020-08-22 PROCEDURE — 63600175 PHARM REV CODE 636 W HCPCS: Performed by: INTERNAL MEDICINE

## 2020-08-22 PROCEDURE — U0002 COVID-19 LAB TEST NON-CDC: HCPCS

## 2020-08-22 PROCEDURE — 84156 ASSAY OF PROTEIN URINE: CPT | Mod: 59

## 2020-08-22 PROCEDURE — 84550 ASSAY OF BLOOD/URIC ACID: CPT

## 2020-08-22 PROCEDURE — 80307 DRUG TEST PRSMV CHEM ANLYZR: CPT

## 2020-08-22 PROCEDURE — 99220 PR INITIAL OBSERVATION CARE,LEVL III: ICD-10-PCS | Mod: ,,, | Performed by: PHYSICIAN ASSISTANT

## 2020-08-22 PROCEDURE — 25000003 PHARM REV CODE 250: Performed by: INTERNAL MEDICINE

## 2020-08-22 PROCEDURE — 99220 PR INITIAL OBSERVATION CARE,LEVL III: CPT | Mod: ,,, | Performed by: PHYSICIAN ASSISTANT

## 2020-08-22 PROCEDURE — 80048 BASIC METABOLIC PNL TOTAL CA: CPT | Mod: 91

## 2020-08-22 PROCEDURE — 94761 N-INVAS EAR/PLS OXIMETRY MLT: CPT

## 2020-08-22 RX ORDER — BUPROPION HYDROCHLORIDE 75 MG/1
75 TABLET ORAL 2 TIMES DAILY
Status: DISCONTINUED | OUTPATIENT
Start: 2020-08-22 | End: 2020-08-24 | Stop reason: HOSPADM

## 2020-08-22 RX ORDER — ONDANSETRON 2 MG/ML
4 INJECTION INTRAMUSCULAR; INTRAVENOUS EVERY 6 HOURS PRN
Status: DISCONTINUED | OUTPATIENT
Start: 2020-08-22 | End: 2020-08-24 | Stop reason: HOSPADM

## 2020-08-22 RX ORDER — SODIUM CHLORIDE, SODIUM LACTATE, POTASSIUM CHLORIDE, CALCIUM CHLORIDE 600; 310; 30; 20 MG/100ML; MG/100ML; MG/100ML; MG/100ML
INJECTION, SOLUTION INTRAVENOUS CONTINUOUS
Status: DISCONTINUED | OUTPATIENT
Start: 2020-08-22 | End: 2020-08-24 | Stop reason: HOSPADM

## 2020-08-22 RX ORDER — FAMOTIDINE 20 MG/1
20 TABLET, FILM COATED ORAL ONCE
Status: COMPLETED | OUTPATIENT
Start: 2020-08-22 | End: 2020-08-22

## 2020-08-22 RX ORDER — HEPARIN SODIUM 5000 [USP'U]/ML
5000 INJECTION, SOLUTION INTRAVENOUS; SUBCUTANEOUS EVERY 8 HOURS
Status: DISCONTINUED | OUTPATIENT
Start: 2020-08-22 | End: 2020-08-24 | Stop reason: HOSPADM

## 2020-08-22 RX ORDER — HYOSCYAMINE SULFATE 0.12 MG/1
0.12 TABLET SUBLINGUAL EVERY 4 HOURS PRN
Status: DISCONTINUED | OUTPATIENT
Start: 2020-08-22 | End: 2020-08-22

## 2020-08-22 RX ORDER — MAG HYDROX/ALUMINUM HYD/SIMETH 200-200-20
30 SUSPENSION, ORAL (FINAL DOSE FORM) ORAL
Status: DISCONTINUED | OUTPATIENT
Start: 2020-08-22 | End: 2020-08-24 | Stop reason: HOSPADM

## 2020-08-22 RX ORDER — SODIUM CHLORIDE 0.9 % (FLUSH) 0.9 %
10 SYRINGE (ML) INJECTION
Status: DISCONTINUED | OUTPATIENT
Start: 2020-08-22 | End: 2020-08-24 | Stop reason: HOSPADM

## 2020-08-22 RX ORDER — HYDROMORPHONE HYDROCHLORIDE 1 MG/ML
0.5 INJECTION, SOLUTION INTRAMUSCULAR; INTRAVENOUS; SUBCUTANEOUS EVERY 6 HOURS PRN
Status: DISCONTINUED | OUTPATIENT
Start: 2020-08-22 | End: 2020-08-22

## 2020-08-22 RX ORDER — ONDANSETRON 2 MG/ML
4 INJECTION INTRAMUSCULAR; INTRAVENOUS EVERY 8 HOURS PRN
Status: DISCONTINUED | OUTPATIENT
Start: 2020-08-22 | End: 2020-08-22

## 2020-08-22 RX ORDER — SODIUM CHLORIDE 0.9 % (FLUSH) 0.9 %
10 SYRINGE (ML) INJECTION
Status: DISCONTINUED | OUTPATIENT
Start: 2020-08-22 | End: 2020-08-22

## 2020-08-22 RX ORDER — LACTULOSE 10 G/15ML
30 SOLUTION ORAL ONCE
Status: COMPLETED | OUTPATIENT
Start: 2020-08-22 | End: 2020-08-22

## 2020-08-22 RX ORDER — SODIUM CHLORIDE 9 MG/ML
INJECTION, SOLUTION INTRAVENOUS CONTINUOUS
Status: DISCONTINUED | OUTPATIENT
Start: 2020-08-22 | End: 2020-08-22

## 2020-08-22 RX ORDER — FENTANYL CITRATE 50 UG/ML
100 INJECTION, SOLUTION INTRAMUSCULAR; INTRAVENOUS
Status: COMPLETED | OUTPATIENT
Start: 2020-08-22 | End: 2020-08-22

## 2020-08-22 RX ORDER — TRAMADOL HYDROCHLORIDE 50 MG/1
50 TABLET ORAL EVERY 6 HOURS PRN
Status: DISCONTINUED | OUTPATIENT
Start: 2020-08-22 | End: 2020-08-24 | Stop reason: HOSPADM

## 2020-08-22 RX ORDER — DICYCLOMINE HYDROCHLORIDE 10 MG/1
10 CAPSULE ORAL 4 TIMES DAILY PRN
Status: DISCONTINUED | OUTPATIENT
Start: 2020-08-22 | End: 2020-08-24 | Stop reason: HOSPADM

## 2020-08-22 RX ORDER — OXYCODONE HYDROCHLORIDE 5 MG/1
5 TABLET ORAL EVERY 6 HOURS PRN
Status: DISCONTINUED | OUTPATIENT
Start: 2020-08-22 | End: 2020-08-22

## 2020-08-22 RX ORDER — DIPHENHYDRAMINE HCL 12.5MG/5ML
12.5 ELIXIR ORAL EVERY 6 HOURS PRN
Status: DISCONTINUED | OUTPATIENT
Start: 2020-08-22 | End: 2020-08-24 | Stop reason: HOSPADM

## 2020-08-22 RX ORDER — ACETAMINOPHEN 325 MG/1
650 TABLET ORAL EVERY 4 HOURS PRN
Status: DISCONTINUED | OUTPATIENT
Start: 2020-08-22 | End: 2020-08-24 | Stop reason: HOSPADM

## 2020-08-22 RX ADMIN — DIPHENHYDRAMINE HYDROCHLORIDE 12.5 MG: 12.5 SOLUTION ORAL at 05:08

## 2020-08-22 RX ADMIN — TRAMADOL HYDROCHLORIDE 50 MG: 50 TABLET, FILM COATED ORAL at 10:08

## 2020-08-22 RX ADMIN — ACETAMINOPHEN 650 MG: 325 TABLET, FILM COATED ORAL at 02:08

## 2020-08-22 RX ADMIN — DICYCLOMINE HYDROCHLORIDE 10 MG: 10 CAPSULE ORAL at 03:08

## 2020-08-22 RX ADMIN — ALUMINUM HYDROXIDE, MAGNESIUM HYDROXIDE, AND SIMETHICONE 30 ML: 200; 200; 20 SUSPENSION ORAL at 05:08

## 2020-08-22 RX ADMIN — ONDANSETRON 4 MG: 2 INJECTION INTRAMUSCULAR; INTRAVENOUS at 10:08

## 2020-08-22 RX ADMIN — FENTANYL CITRATE 100 MCG: 50 INJECTION, SOLUTION INTRAMUSCULAR; INTRAVENOUS at 01:08

## 2020-08-22 RX ADMIN — FAMOTIDINE 20 MG: 20 TABLET, FILM COATED ORAL at 04:08

## 2020-08-22 RX ADMIN — SODIUM CHLORIDE: 0.9 INJECTION, SOLUTION INTRAVENOUS at 02:08

## 2020-08-22 RX ADMIN — OXYCODONE HYDROCHLORIDE 5 MG: 5 TABLET ORAL at 05:08

## 2020-08-22 RX ADMIN — DICYCLOMINE HYDROCHLORIDE 10 MG: 10 CAPSULE ORAL at 08:08

## 2020-08-22 RX ADMIN — TRAMADOL HYDROCHLORIDE 50 MG: 50 TABLET, FILM COATED ORAL at 04:08

## 2020-08-22 RX ADMIN — LACTULOSE 30 G: 20 SOLUTION ORAL at 04:08

## 2020-08-22 RX ADMIN — PROMETHAZINE HYDROCHLORIDE 12.5 MG: 25 INJECTION INTRAMUSCULAR; INTRAVENOUS at 05:08

## 2020-08-22 RX ADMIN — ALUMINUM HYDROXIDE, MAGNESIUM HYDROXIDE, AND SIMETHICONE 30 ML: 200; 200; 20 SUSPENSION ORAL at 10:08

## 2020-08-22 RX ADMIN — BUPROPION HYDROCHLORIDE 75 MG: 75 TABLET, FILM COATED ORAL at 08:08

## 2020-08-22 RX ADMIN — SODIUM CHLORIDE, SODIUM LACTATE, POTASSIUM CHLORIDE, AND CALCIUM CHLORIDE: .6; .31; .03; .02 INJECTION, SOLUTION INTRAVENOUS at 04:08

## 2020-08-22 RX ADMIN — TRAMADOL HYDROCHLORIDE 50 MG: 50 TABLET, FILM COATED ORAL at 09:08

## 2020-08-22 RX ADMIN — SODIUM CHLORIDE: 0.9 INJECTION, SOLUTION INTRAVENOUS at 09:08

## 2020-08-22 RX ADMIN — DICYCLOMINE HYDROCHLORIDE 10 MG: 10 CAPSULE ORAL at 02:08

## 2020-08-22 NOTE — PROGRESS NOTES
[facility] Hospital Medicine  Progress Note    Patient Name: Slime Bradshaw  MRN: [id number]  Patient Class: OP- Observation   Admission Date: 8/21/2020  Length of Stay: 0 days  Attending Physician: Zaire Lizarraga MD  Primary Care Provider: Lindsey Lozoya MD        Subjective:     Principal Problem:FRENCH (acute kidney injury)        HPI:  Mr. Slime Bradshaw is a 39 y.o. male, with PMH of mood disorder, HLD, chronic back pain, and anxiety, who presented [place] [facility] [place] on [place]/21/20 with 2 days of abdominal cramping and shooting tightness. He states the pain is worse after eating, and when he moves, and is located inferior to the belly button.  He states he had the HPV vaccine and a Toradol injection the morning the pain started. Later that day, he was constipated Wednesday, and then had loose stools after taking mag citrate. He denies blood in the stool, nausea, vomiting, chest pain, SOB, fever, chills.He denies h/o abdominal surgeries. He was evaluated it he ED with labs which showed decline in renal function with BUN 16 and Cr of 2.1 without hyperkalemia. A CT of the abdomen and pelvis showed no acute findings. After 4 mg morphine, two 1 mg doses dilaudid and a 100 mcg dose of fentanyl he was still unable to tolerate the pain and was placed on OBS.     Overview/Hospital Course:  No notes on file    Interval History: less abd pain, seems to be associated with eating?    Review of Systems   Constitutional: Positive for activity change (decreased) and appetite change (decreased). Negative for chills, diaphoresis and fever.   HENT: Negative for congestion, ear pain, postnasal drip, sore throat, trouble swallowing and voice change.    Respiratory: Negative for cough, chest tightness, shortness of breath and wheezing.    Cardiovascular: Negative for chest pain, palpitations and leg swelling.   Gastrointestinal: Positive for abdominal distention and constipation. Negative for blood in stool,  diarrhea, nausea and vomiting.   Genitourinary: Negative for decreased urine volume, difficulty urinating, dysuria, frequency, hematuria and urgency.   Musculoskeletal: Positive for back pain (chronic ). Negative for joint swelling, myalgias, neck pain and neck stiffness.   Skin: Negative for pallor, rash and wound.   Neurological: Negative for dizziness, syncope, weakness, light-headedness and headaches.   Psychiatric/Behavioral: Negative for confusion and decreased concentration.     Objective:     Vital Signs (Most Recent):  Temp: 97.9 °F (36.6 °C) (08/22/20 1614)  Pulse: 61 (08/22/20 1614)  Resp: 18 (08/22/20 1614)  BP: (!) 152/95 (08/22/20 1614)  SpO2: 98 % (08/22/20 1614) Vital Signs (24h Range):  Temp:  [97.9 °F (36.6 °C)-98.5 °F (36.9 °C)] 97.9 °F (36.6 °C)  Pulse:  [55-85] 61  Resp:  [16-20] 18  SpO2:  [95 %-99 %] 98 %  BP: (133-157)/(72-95) 152/95     Weight: 103 kg (227 lb)  Body mass index is 29.15 kg/m².    Intake/Output Summary (Last 24 hours) at 8/22/2020 1737  Last data filed at 8/22/2020 1609  Gross per 24 hour   Intake 2461 ml   Output --   Net 2461 ml      Physical Exam  Vitals signs and nursing note reviewed.   Constitutional:       General: He is not in acute distress.     Appearance: He is well-developed. He is not ill-appearing, toxic-appearing or diaphoretic.   HENT:      Head: Normocephalic and atraumatic.      Right Ear: External ear normal.      Left Ear: External ear normal.   Eyes:      General: No scleral icterus.  Neck:      Musculoskeletal: Normal range of motion and neck supple.      Vascular: No JVD.      Trachea: No tracheal deviation.   Cardiovascular:      Rate and Rhythm: Normal rate and regular rhythm.      Heart sounds: Normal heart sounds. No murmur.   Pulmonary:      Effort: Pulmonary effort is normal. No respiratory distress.      Breath sounds: Normal breath sounds. No rales.   Abdominal:      General: Bowel sounds are normal. There is no distension.      Palpations:  Abdomen is soft.      Tenderness: There is abdominal tenderness (mild TTP).   Musculoskeletal: Normal range of motion.         General: No deformity.   Skin:     General: Skin is warm and dry.   Neurological:      Mental Status: He is alert and oriented to person, place, and time.      Motor: No abnormal muscle tone.   Psychiatric:         Behavior: Behavior normal.         Significant Labs:   CBC:   Recent Labs   Lab 08/21/20 2058 08/22/20  0338   WBC 5.57 5.69   HGB 14.5 13.9*   HCT 42.7 41.2    263     CMP:   Recent Labs   Lab 08/21/20 2058 08/22/20 0338 08/22/20  1325    142 141   K 4.1 3.7 4.2    107 106   CO2 27 23 26    105 90   BUN 16 17 19   CREATININE 2.1* 2.2* 2.3*   CALCIUM 9.1 8.8 8.8   PROT 7.2  --   --    ALBUMIN 3.7  --   --    BILITOT 0.6  --   --    ALKPHOS 55  --   --    AST 23  --   --    ALT 22  --   --    ANIONGAP 9 12 9   EGFRNONAA 38* 36* 34*     All pertinent labs within the past 24 hours have been reviewed.    Significant Imaging: I have reviewed all pertinent imaging results/findings within the past 24 hours.   Imaging Results          CT Abdomen Pelvis  Without Contrast (Final result)  Result time 08/21/20 23:19:23    Final result by Priyanka Smith MD (08/21/20 23:19:23)                 Impression:      No acute findings on CT abdomen and pelvis without contrast.    Punctate nonobstructing left renal calculus.      Electronically signed by: Priyanka Smith  Date:    08/21/2020  Time:    23:19             Narrative:    EXAMINATION:  CT ABDOMEN PELVIS WITHOUT    CLINICAL HISTORY:  Abdominal cramping.    TECHNIQUE:  5 mm unenhanced axial images from the lung bases through the greater trochanters were performed.  Coronal and sagittal reformatted images were provided.    COMPARISON:  None.    FINDINGS:  Within the limits of a noncontrast examination, the liver, spleen, pancreas, and adrenal glands are unremarkable.  The gallbladder contains no calcified  gallstones.    There is a punctate hyperdensity in the left mid to upper pole (series 2 axial image 62).    There is no gross abdominal adenopathy or ascites.    There are no pelvic masses or adenopathy.  There is no free pelvic fluid.  The appendix is not inflamed.    At the lung bases, there is mild bibasilar atelectatic change.                                    Assessment/Plan:      * FRENCH (acute kidney injury)  - Mr. Slime Bradshaw presented with abdominal pain, and labs showed decline in renal function  - patient admits to taking NSAIDs but states only about 2x a month   - retroperitoneal US unremarkable  - avoid nephrotoxins  - renally dose meds   - consult Nephrology  - d/w Dr. Israel    Lower abdominal pain  - non-acute abdominal exam, improving  - CT without acute findings  - labs without acute lyte abnormalities  - urine tox neg; uses CBD gummies  - PRN pain/nausea meds   - queery renal stone?  - monitor       Essential hypertension  - reasonably stable, exacerbated with pain  - no listed home meds  - monitor       VTE Risk Mitigation (From admission, onward)         Ordered     heparin (porcine) injection 5,000 Units  Every 8 hours      08/22/20 0215     IP VTE LOW RISK PATIENT  Once      08/22/20 0233     Place sequential compression device  Until discontinued      08/22/20 0233                Discharge Planning   DINA:      Code Status: Full Code   Is the patient medically ready for discharge?:     Reason for patient still in hospital (select all that apply): Patient trending condition, Laboratory test and Treatment  Discharge Plan A: Home                  Shonna Villarreal PA-C  Department of Hospital Medicine   [facility], [place], operated by [facility]

## 2020-08-22 NOTE — H&P
[facility] Hospital Medicine  History & Physical    Patient Name: Slime Bradshaw  MRN: [id number]  Admission Date: 8/21/2020  Attending Physician: Zaire Lizarraga MD   Primary Care Provider: Lindsey Lozoya MD         Patient information was obtained from patient, past medical records and ER records.     Subjective:     Principal Problem:FRENCH (acute kidney injury)    Chief Complaint:   Chief Complaint   Patient presents with    Abdominal Cramping     since night before last, denies N/V/D        HPI: Mr. Slime Bradshaw is a 39 y.o. male, with PMH of mood disorder, HLD, chronic back pain, and anxiety, who presented to [facility] ED on 8/21/20 with 2 days of abdominal cramping and shooting tightness. He states the pain is worse after eating, and when he moves, and is located inferior to the belly button.  He states he had the HPV vaccine and a Toradol injection the morning the pain started. Later that day, he was constipated Wednesday, and then had loose stools after taking mag citrate. He denies blood in the stool, nausea, vomiting, chest pain, SOB, fever, chills.He denies h/o abdominal surgeries. He was evaluated it he ED with labs which showed decline in renal function with BUN 16 and Cr of 2.1 without hyperkalemia. A CT of the abdomen and pelvis showed no acute findings. After 4 mg morphine, two 1 mg doses dilaudid and a 100 mcg dose of fentanyl he was still unable to tolerate the pain and was placed on OBS.     History reviewed. No pertinent past medical history.    Past Surgical History:   Procedure Laterality Date    ANTERIOR CRUCIATE LIGAMENT REPAIR Right     KNEE ARTHROSCOPY W/ ACL RECONSTRUCTION      right    WISDOM TOOTH EXTRACTION         Review of patient's allergies indicates:   Allergen Reactions    Hydrocodone Itching    Iodine      Other reaction(s): Seafood - swelling, Itch       No current facility-administered medications on file prior to encounter.      Current  Outpatient Medications on File Prior to Encounter   Medication Sig    atorvastatin (LIPITOR) 40 MG tablet Take 1 tablet (40 mg total) by mouth once daily.    buPROPion (WELLBUTRIN) 75 MG tablet Take 1 tablet (75 mg total) by mouth 2 (two) times daily.    ibuprofen (ADVIL,MOTRIN) 800 MG tablet Take 1 tablet (800 mg total) by mouth 3 (three) times daily.    traMADoL (ULTRAM) 50 mg tablet Take 1 tablet (50 mg total) by mouth every 6 (six) hours.    zolpidem (AMBIEN) 10 mg Tab Take 1 tablet (10 mg total) by mouth nightly as needed.     Family History     Problem Relation (Age of Onset)    Asthma Son    COPD Maternal Grandmother    Cancer Father    Diabetes Mother    Eczema Daughter    Hypertension Mother, Brother    Lupus Sister    No Known Problems Daughter, Son, Son        Tobacco Use    Smoking status: Current Every Day Smoker     Types: Vaping w/o nicotine    Tobacco comment: No longer vapes   Substance and Sexual Activity    Alcohol use: Not Currently     Alcohol/week: 0.0 - 1.7 standard drinks     Frequency: Never     Drinks per session: Patient refused     Binge frequency: Never     Comment: None x 3 years     Drug use: Yes     Types: Marijuana     Comment: No longer smokes     Sexual activity: Yes     Partners: Female     Review of Systems   Constitutional: Positive for activity change (decreased) and appetite change (decreased). Negative for chills, diaphoresis and fever.   HENT: Negative for congestion, ear pain, postnasal drip, sore throat, trouble swallowing and voice change.    Respiratory: Negative for cough, chest tightness, shortness of breath and wheezing.    Cardiovascular: Negative for chest pain, palpitations and leg swelling.   Gastrointestinal: Positive for abdominal distention, abdominal pain and constipation. Negative for blood in stool, diarrhea, nausea and vomiting.   Genitourinary: Negative for decreased urine volume, difficulty urinating, dysuria, frequency, hematuria and urgency.    Musculoskeletal: Positive for back pain (chronic ). Negative for joint swelling, myalgias, neck pain and neck stiffness.   Skin: Negative for pallor, rash and wound.   Neurological: Negative for dizziness, syncope, weakness, light-headedness and headaches.   Psychiatric/Behavioral: Negative for confusion and decreased concentration.     Objective:     Vital Signs (Most Recent):  Temp: 98.2 °F (36.8 °C) (08/22/20 0238)  Pulse: 78 (08/22/20 0238)  Resp: 18 (08/22/20 0238)  BP: (!) 148/92 (08/22/20 0238)  SpO2: 95 % (08/22/20 0238) Vital Signs (24h Range):  Temp:  [98.2 °F (36.8 °C)-98.5 °F (36.9 °C)] 98.2 °F (36.8 °C)  Pulse:  [78-85] 78  Resp:  [16-18] 18  SpO2:  [95 %-99 %] 95 %  BP: (141-157)/(77-92) 148/92     Weight: 103 kg (227 lb)  Body mass index is 29.15 kg/m².    Physical Exam  Vitals signs and nursing note reviewed.   Constitutional:       General: He is not in acute distress.     Appearance: He is well-developed. He is not ill-appearing, toxic-appearing or diaphoretic.   HENT:      Head: Normocephalic and atraumatic.      Right Ear: External ear normal.      Left Ear: External ear normal.   Eyes:      General: No scleral icterus.     Conjunctiva/sclera: Conjunctivae normal.      Pupils: Pupils are equal, round, and reactive to light.   Neck:      Musculoskeletal: Normal range of motion and neck supple.      Vascular: No JVD.      Trachea: No tracheal deviation.   Cardiovascular:      Rate and Rhythm: Normal rate and regular rhythm.      Heart sounds: Normal heart sounds. No murmur. No friction rub. No gallop.    Pulmonary:      Effort: Pulmonary effort is normal. No respiratory distress.      Breath sounds: Normal breath sounds. No stridor. No wheezing or rales.   Chest:      Chest wall: No tenderness.   Abdominal:      General: Bowel sounds are normal. There is no distension.      Palpations: Abdomen is soft. There is no mass.      Tenderness: There is no abdominal tenderness. There is no guarding.    Musculoskeletal: Normal range of motion.         General: No deformity.   Skin:     General: Skin is warm and dry.   Neurological:      Mental Status: He is alert and oriented to person, place, and time.      Cranial Nerves: No cranial nerve deficit.      Motor: No abnormal muscle tone.      Coordination: Coordination normal.   Psychiatric:         Behavior: Behavior normal.         Thought Content: Thought content normal.         Judgment: Judgment normal.           CRANIAL NERVES     CN III, IV, VI   Pupils are equal, round, and reactive to light.       Significant Labs:   BMP:   Recent Labs   Lab 08/21/20 2058         K 4.1      CO2 27   BUN 16   CREATININE 2.1*   CALCIUM 9.1     CBC:   Recent Labs   Lab 08/21/20 2058   WBC 5.57   HGB 14.5   HCT 42.7        CMP:   Recent Labs   Lab 08/21/20 2058      K 4.1      CO2 27      BUN 16   CREATININE 2.1*   CALCIUM 9.1   PROT 7.2   ALBUMIN 3.7   BILITOT 0.6   ALKPHOS 55   AST 23   ALT 22   ANIONGAP 9   EGFRNONAA 38*     Urine Culture: No results for input(s): LABURIN in the last 48 hours.  Urine Studies:   Recent Labs   Lab 08/21/20 2134   COLORU Yellow   APPEARANCEUA Clear   PHUR 7.0   SPECGRAV <=1.005*   PROTEINUA Trace*   GLUCUA Negative   KETONESU Negative   BILIRUBINUA Negative   OCCULTUA Trace*   NITRITE Negative   UROBILINOGEN Negative   LEUKOCYTESUR Negative     All pertinent labs within the past 24 hours have been reviewed.    Significant Imaging: I have reviewed all pertinent imaging results/findings within the past 24 hours.   Imaging Results          CT Abdomen Pelvis  Without Contrast (Final result)  Result time 08/21/20 23:19:23    Final result by Priyanka Smith MD (08/21/20 23:19:23)                 Impression:      No acute findings on CT abdomen and pelvis without contrast.    Punctate nonobstructing left renal calculus.      Electronically signed by: Priyanka  Luis  Date:    08/21/2020  Time:    23:19             Narrative:    EXAMINATION:  CT ABDOMEN PELVIS WITHOUT    CLINICAL HISTORY:  Abdominal cramping.    TECHNIQUE:  5 mm unenhanced axial images from the lung bases through the greater trochanters were performed.  Coronal and sagittal reformatted images were provided.    COMPARISON:  None.    FINDINGS:  Within the limits of a noncontrast examination, the liver, spleen, pancreas, and adrenal glands are unremarkable.  The gallbladder contains no calcified gallstones.    There is a punctate hyperdensity in the left mid to upper pole (series 2 axial image 62).    There is no gross abdominal adenopathy or ascites.    There are no pelvic masses or adenopathy.  There is no free pelvic fluid.  The appendix is not inflamed.    At the lung bases, there is mild bibasilar atelectatic change.                                 Assessment/Plan:     * FRENCH (acute kidney injury)  - Mr. Slime Bradshaw presented with abdominal pain, and labs showed decline in renal function  - patient admits to taking NSAIDs for foot pain   - urine studies ordered   - retroperitoneal US ordered   - avoid nephrotoxins  - renally dose meds   - monitor labs  - hydrate overnight  - consider Nephro consult in AM     Lower abdominal pain  - non-acute abdominal exam  - CT without acute findings  - labs without acute lyte abnormalities  - urine tox pending  - PRN pain/nausea meds   - suspect this may be of a muscular origin as it reproduces with movement  - monitor       Essential hypertension  - hypertensive at present   - no listed home meds  - monitor       VTE Risk Mitigation (From admission, onward)         Ordered     heparin (porcine) injection 5,000 Units  Every 8 hours      08/22/20 0215     IP VTE LOW RISK PATIENT  Once      08/22/20 0233     Place sequential compression device  Until discontinued      08/22/20 0233                   Jamaica Epps PA-C  Department of Hospital Medicine    [facility]

## 2020-08-22 NOTE — ED PROVIDER NOTES
Encounter Date: 8/21/2020       History     Chief Complaint   Patient presents with    Abdominal Cramping     since night before last, denies N/V/D     Seen by provider at 9:22PM:     Patient is a 39-year-old male who presents to the emergency department for diffuse, cramping abdominal pain.  Symptoms started 2 nights ago.  Patient denies any associated nausea/vomiting/diarrhea.  Patient actually noted to have no bowel movements yesterday, which was unusual for him.  His baseline is 2-3 bowel movements today.  He therefore took some Mag citrate and did have some loose stools after that.   The pain is constant and has gradually intensified since onset.  He also notes some tingling to his mid sternum, which started today.  Denies any chest pain or shortness of breath otherwise.  He denies any fevers/chills.  Denies any urinary symptoms.  He did note that the pain worsened after eating.  Patient denies any history of abdominal surgeries.  Patient was recently tested for COVID which was negative.        Review of patient's allergies indicates:   Allergen Reactions    Hydrocodone Itching    Iodine      Other reaction(s): Seafood - swelling, Itch     History reviewed. No pertinent past medical history.  Past Surgical History:   Procedure Laterality Date    KNEE ARTHROSCOPY W/ ACL RECONSTRUCTION      right    WISDOM TOOTH EXTRACTION       Family History   Problem Relation Age of Onset    Cancer Father         throat-- smoker    Hypertension Mother     Diabetes Mother     Hypertension Brother     Prostate cancer Neg Hx      Social History     Tobacco Use    Smoking status: Never Smoker   Substance Use Topics    Alcohol use: Not Currently     Alcohol/week: 0.0 - 1.7 standard drinks     Frequency: Never     Drinks per session: Patient refused     Binge frequency: Never    Drug use: No     Review of Systems   Constitutional: Negative for chills and fever.   HENT: Negative for congestion and rhinorrhea.     Respiratory: Negative for chest tightness and shortness of breath.    Cardiovascular: Negative for chest pain and palpitations.   Gastrointestinal: Positive for abdominal pain. Negative for nausea and vomiting.   Genitourinary: Negative for dysuria and flank pain.   Musculoskeletal: Negative for back pain and neck pain.   Skin: Negative for color change and wound.   Neurological: Negative for dizziness and headaches.       Physical Exam     Initial Vitals [08/21/20 1936]   BP Pulse Resp Temp SpO2   (!) 157/89 85 18 98.5 °F (36.9 °C) 97 %      MAP       --         Physical Exam    Nursing note and vitals reviewed.  Constitutional: He appears well-developed and well-nourished.   HENT:   Head: Normocephalic and atraumatic.   Eyes: Conjunctivae are normal.   Neck: Normal range of motion. Neck supple.   Cardiovascular: Normal rate, regular rhythm and normal heart sounds.   2+ DP/PT pulses bilaterally.   Pulmonary/Chest: Breath sounds normal. No respiratory distress. He has no wheezes. He has no rales.   Abdominal: Soft. Bowel sounds are normal. He exhibits no distension. There is abdominal tenderness.   Diffuse abdominal tenderness   Musculoskeletal: Normal range of motion. No edema.   Neurological: He is alert and oriented to person, place, and time.   Skin: Skin is warm and dry. Capillary refill takes less than 2 seconds.         ED Course   Procedures  Labs Reviewed   CBC W/ AUTO DIFFERENTIAL - Abnormal; Notable for the following components:       Result Value    RDW 11.4 (*)     MPV 8.7 (*)     All other components within normal limits   BASIC METABOLIC PANEL - Abnormal; Notable for the following components:    Creatinine 2.1 (*)     eGFR if  44 (*)     eGFR if non  38 (*)     All other components within normal limits   LIPASE   HEPATIC FUNCTION PANEL   URINALYSIS   HEPATIC FUNCTION PANEL   LIPASE          Imaging Results    None          Medical Decision Making:   History:   Old  Medical Records: I decided to obtain old medical records.  Old Records Summarized: other records and records from clinic visits.  Initial Assessment:   9:22PM:  Patient is a 39-year-old male presents to the emergency department with diffuse abdominal pain.  Patient appears well, nontoxic.  He does have diffuse tenderness on exam.  Will plan for labs, imaging, will continue to follow and reassess.  Clinical Tests:   Lab Tests: Ordered and Reviewed  Radiological Study: Ordered and Reviewed  Other:   I have discussed this case with another health care provider.    12:37AM:  Patient continues to have pain, despite 3 rounds of pain medication.  His CT is negative for any acute etiology for the pain.  Of note, his creatinine has also increased from 1.0-2.1 over the past 2 months.  Patient denies persistent NSAID use.  However given his persistent pain, will plan to admit for further observation and management.  I updated the patient regarding results along with plan for admission.  Agreeable to plan and all questions answered.    1:27 AM:  I discussed patient with Jamaica Epps PA-C, who is agreeable to plan for admission under Dr. Lizarraga.                                  Clinical Impression:     1. Abdominal cramping                                   Aliya Rincon MD  08/22/20 0143

## 2020-08-22 NOTE — PLAN OF CARE
SW met with patient at bedside to complete discharge planning assessment.  Patient alert and oriented xs 4.  Patient verified all demographic information on facesheet is correct.  Patient verified PCP is Dr. Lindsey Lozoya.  Patient verified primary health insurance is [employer] ([place] Medicaid[place]  Patient with NO home health or DME.  Patient with NO POA or Living Will.  Patient not on dialysis or medication coumadin.  Patient with no 30 day admission.  Patient with no financial issues at this time.  Patient family will provide transportation upon discharge from facility.  Patient independent with ADLs, live with spouse and 4 minor children, drives self.         08/22/20 0815   Discharge Assessment   Assessment Type Discharge Planning Assessment   Confirmed/corrected address and phone number on facesheet? Yes   Assessment information obtained from? Patient   Prior to hospitilization cognitive status: Alert/Oriented   Prior to hospitalization functional status: Independent   Current cognitive status: Alert/Oriented   Current Functional Status: Independent   Lives With spouse;child(rom), dependent   Able to Return to Prior Arrangements yes   Is patient able to care for self after discharge? Yes   Patient's perception of discharge disposition home or selfcare   Readmission Within the Last 30 Days no previous admission in last 30 days   Patient currently being followed by outpatient case management? No   Patient currently receives any other outside agency services? No   Equipment Currently Used at Home none   Do you have any problems affording any of your prescribed medications? No   Does the patient have transportation home? Yes   Transportation Anticipated family or friend will provide   Does the patient receive services at the [facility]? No   Discharge Plan A Home   Discharge Plan B Home with family   DME Needed Upon Discharge  none   Patient/Family in Agreement with Plan yes

## 2020-08-22 NOTE — SUBJECTIVE & OBJECTIVE
Interval History: less abd pain, seems to be associated with eating?    Review of Systems   Constitutional: Positive for activity change (decreased) and appetite change (decreased). Negative for chills, diaphoresis and fever.   HENT: Negative for congestion, ear pain, postnasal drip, sore throat, trouble swallowing and voice change.    Respiratory: Negative for cough, chest tightness, shortness of breath and wheezing.    Cardiovascular: Negative for chest pain, palpitations and leg swelling.   Gastrointestinal: Positive for abdominal distention and constipation. Negative for blood in stool, diarrhea, nausea and vomiting.   Genitourinary: Negative for decreased urine volume, difficulty urinating, dysuria, frequency, hematuria and urgency.   Musculoskeletal: Positive for back pain (chronic ). Negative for joint swelling, myalgias, neck pain and neck stiffness.   Skin: Negative for pallor, rash and wound.   Neurological: Negative for dizziness, syncope, weakness, light-headedness and headaches.   Psychiatric/Behavioral: Negative for confusion and decreased concentration.     Objective:     Vital Signs (Most Recent):  Temp: 97.9 °F (36.6 °C) (08/22/20 1614)  Pulse: 61 (08/22/20 1614)  Resp: 18 (08/22/20 1614)  BP: (!) 152/95 (08/22/20 1614)  SpO2: 98 % (08/22/20 1614) Vital Signs (24h Range):  Temp:  [97.9 °F (36.6 °C)-98.5 °F (36.9 °C)] 97.9 °F (36.6 °C)  Pulse:  [55-85] 61  Resp:  [16-20] 18  SpO2:  [95 %-99 %] 98 %  BP: (133-157)/(72-95) 152/95     Weight: 103 kg (227 lb)  Body mass index is 29.15 kg/m².    Intake/Output Summary (Last 24 hours) at 8/22/2020 1737  Last data filed at 8/22/2020 1609  Gross per 24 hour   Intake 2461 ml   Output --   Net 2461 ml      Physical Exam  Vitals signs and nursing note reviewed.   Constitutional:       General: He is not in acute distress.     Appearance: He is well-developed. He is not ill-appearing, toxic-appearing or diaphoretic.   HENT:      Head: Normocephalic and  atraumatic.      Right Ear: External ear normal.      Left Ear: External ear normal.   Eyes:      General: No scleral icterus.  Neck:      Musculoskeletal: Normal range of motion and neck supple.      Vascular: No JVD.      Trachea: No tracheal deviation.   Cardiovascular:      Rate and Rhythm: Normal rate and regular rhythm.      Heart sounds: Normal heart sounds. No murmur.   Pulmonary:      Effort: Pulmonary effort is normal. No respiratory distress.      Breath sounds: Normal breath sounds. No rales.   Abdominal:      General: Bowel sounds are normal. There is no distension.      Palpations: Abdomen is soft.      Tenderness: There is abdominal tenderness (mild TTP).   Musculoskeletal: Normal range of motion.         General: No deformity.   Skin:     General: Skin is warm and dry.   Neurological:      Mental Status: He is alert and oriented to person, place, and time.      Motor: No abnormal muscle tone.   Psychiatric:         Behavior: Behavior normal.         Significant Labs:   CBC:   Recent Labs   Lab 08/21/20 2058 08/22/20  0338   WBC 5.57 5.69   HGB 14.5 13.9*   HCT 42.7 41.2    263     CMP:   Recent Labs   Lab 08/21/20 2058 08/22/20  0338 08/22/20  1325    142 141   K 4.1 3.7 4.2    107 106   CO2 27 23 26    105 90   BUN 16 17 19   CREATININE 2.1* 2.2* 2.3*   CALCIUM 9.1 8.8 8.8   PROT 7.2  --   --    ALBUMIN 3.7  --   --    BILITOT 0.6  --   --    ALKPHOS 55  --   --    AST 23  --   --    ALT 22  --   --    ANIONGAP 9 12 9   EGFRNONAA 38* 36* 34*     All pertinent labs within the past 24 hours have been reviewed.    Significant Imaging: I have reviewed all pertinent imaging results/findings within the past 24 hours.   Imaging Results          CT Abdomen Pelvis  Without Contrast (Final result)  Result time 08/21/20 23:19:23    Final result by Priyanka Smith MD (08/21/20 23:19:23)                 Impression:      No acute findings on CT abdomen and pelvis without  contrast.    Punctate nonobstructing left renal calculus.      Electronically signed by: Priyanka Smith  Date:    08/21/2020  Time:    23:19             Narrative:    EXAMINATION:  CT ABDOMEN PELVIS WITHOUT    CLINICAL HISTORY:  Abdominal cramping.    TECHNIQUE:  5 mm unenhanced axial images from the lung bases through the greater trochanters were performed.  Coronal and sagittal reformatted images were provided.    COMPARISON:  None.    FINDINGS:  Within the limits of a noncontrast examination, the liver, spleen, pancreas, and adrenal glands are unremarkable.  The gallbladder contains no calcified gallstones.    There is a punctate hyperdensity in the left mid to upper pole (series 2 axial image 62).    There is no gross abdominal adenopathy or ascites.    There are no pelvic masses or adenopathy.  There is no free pelvic fluid.  The appendix is not inflamed.    At the lung bases, there is mild bibasilar atelectatic change.

## 2020-08-22 NOTE — SUBJECTIVE & OBJECTIVE
History reviewed. No pertinent past medical history.    Past Surgical History:   Procedure Laterality Date    ANTERIOR CRUCIATE LIGAMENT REPAIR Right     KNEE ARTHROSCOPY W/ ACL RECONSTRUCTION      right    WISDOM TOOTH EXTRACTION         Review of patient's allergies indicates:   Allergen Reactions    Hydrocodone Itching    Iodine      Other reaction(s): Seafood - swelling, Itch       No current facility-administered medications on file prior to encounter.      Current Outpatient Medications on File Prior to Encounter   Medication Sig    atorvastatin (LIPITOR) 40 MG tablet Take 1 tablet (40 mg total) by mouth once daily.    buPROPion (WELLBUTRIN) 75 MG tablet Take 1 tablet (75 mg total) by mouth 2 (two) times daily.    ibuprofen (ADVIL,MOTRIN) 800 MG tablet Take 1 tablet (800 mg total) by mouth 3 (three) times daily.    traMADoL (ULTRAM) 50 mg tablet Take 1 tablet (50 mg total) by mouth every 6 (six) hours.    zolpidem (AMBIEN) 10 mg Tab Take 1 tablet (10 mg total) by mouth nightly as needed.     Family History     Problem Relation (Age of Onset)    Asthma Son    COPD Maternal Grandmother    Cancer Father    Diabetes Mother    Eczema Daughter    Hypertension Mother, Brother    Lupus Sister    No Known Problems Daughter, Son, Son        Tobacco Use    Smoking status: Current Every Day Smoker     Types: Vaping w/o nicotine    Tobacco comment: No longer vapes   Substance and Sexual Activity    Alcohol use: Not Currently     Alcohol/week: 0.0 - 1.7 standard drinks     Frequency: Never     Drinks per session: Patient refused     Binge frequency: Never     Comment: None x 3 years     Drug use: Yes     Types: Marijuana     Comment: No longer smokes     Sexual activity: Yes     Partners: Female     Review of Systems   Constitutional: Positive for activity change (decreased) and appetite change (decreased). Negative for chills, diaphoresis and fever.   HENT: Negative for congestion, ear pain, postnasal drip,  sore throat, trouble swallowing and voice change.    Respiratory: Negative for cough, chest tightness, shortness of breath and wheezing.    Cardiovascular: Negative for chest pain, palpitations and leg swelling.   Gastrointestinal: Positive for abdominal distention, abdominal pain and constipation. Negative for blood in stool, diarrhea, nausea and vomiting.   Genitourinary: Negative for decreased urine volume, difficulty urinating, dysuria, frequency, hematuria and urgency.   Musculoskeletal: Positive for back pain (chronic ). Negative for joint swelling, myalgias, neck pain and neck stiffness.   Skin: Negative for pallor, rash and wound.   Neurological: Negative for dizziness, syncope, weakness, light-headedness and headaches.   Psychiatric/Behavioral: Negative for confusion and decreased concentration.     Objective:     Vital Signs (Most Recent):  Temp: 98.2 °F (36.8 °C) (08/22/20 0238)  Pulse: 78 (08/22/20 0238)  Resp: 18 (08/22/20 0238)  BP: (!) 148/92 (08/22/20 0238)  SpO2: 95 % (08/22/20 0238) Vital Signs (24h Range):  Temp:  [98.2 °F (36.8 °C)-98.5 °F (36.9 °C)] 98.2 °F (36.8 °C)  Pulse:  [78-85] 78  Resp:  [16-18] 18  SpO2:  [95 %-99 %] 95 %  BP: (141-157)/(77-92) 148/92     Weight: 103 kg (227 lb)  Body mass index is 29.15 kg/m².    Physical Exam  Vitals signs and nursing note reviewed.   Constitutional:       General: He is not in acute distress.     Appearance: He is well-developed. He is not ill-appearing, toxic-appearing or diaphoretic.   HENT:      Head: Normocephalic and atraumatic.      Right Ear: External ear normal.      Left Ear: External ear normal.   Eyes:      General: No scleral icterus.     Conjunctiva/sclera: Conjunctivae normal.      Pupils: Pupils are equal, round, and reactive to light.   Neck:      Musculoskeletal: Normal range of motion and neck supple.      Vascular: No JVD.      Trachea: No tracheal deviation.   Cardiovascular:      Rate and Rhythm: Normal rate and regular rhythm.       Heart sounds: Normal heart sounds. No murmur. No friction rub. No gallop.    Pulmonary:      Effort: Pulmonary effort is normal. No respiratory distress.      Breath sounds: Normal breath sounds. No stridor. No wheezing or rales.   Chest:      Chest wall: No tenderness.   Abdominal:      General: Bowel sounds are normal. There is no distension.      Palpations: Abdomen is soft. There is no mass.      Tenderness: There is no abdominal tenderness. There is no guarding.   Musculoskeletal: Normal range of motion.         General: No deformity.   Skin:     General: Skin is warm and dry.   Neurological:      Mental Status: He is alert and oriented to person, place, and time.      Cranial Nerves: No cranial nerve deficit.      Motor: No abnormal muscle tone.      Coordination: Coordination normal.   Psychiatric:         Behavior: Behavior normal.         Thought Content: Thought content normal.         Judgment: Judgment normal.           CRANIAL NERVES     CN III, IV, VI   Pupils are equal, round, and reactive to light.       Significant Labs:   BMP:   Recent Labs   Lab 08/21/20 2058         K 4.1      CO2 27   BUN 16   CREATININE 2.1*   CALCIUM 9.1     CBC:   Recent Labs   Lab 08/21/20 2058   WBC 5.57   HGB 14.5   HCT 42.7        CMP:   Recent Labs   Lab 08/21/20 2058      K 4.1      CO2 27      BUN 16   CREATININE 2.1*   CALCIUM 9.1   PROT 7.2   ALBUMIN 3.7   BILITOT 0.6   ALKPHOS 55   AST 23   ALT 22   ANIONGAP 9   EGFRNONAA 38*     Urine Culture: No results for input(s): LABURIN in the last 48 hours.  Urine Studies:   Recent Labs   Lab 08/21/20 2134   COLORU Yellow   APPEARANCEUA Clear   PHUR 7.0   SPECGRAV <=1.005*   PROTEINUA Trace*   GLUCUA Negative   KETONESU Negative   BILIRUBINUA Negative   OCCULTUA Trace*   NITRITE Negative   UROBILINOGEN Negative   LEUKOCYTESUR Negative     All pertinent labs within the past 24 hours have been reviewed.    Significant Imaging:  I have reviewed all pertinent imaging results/findings within the past 24 hours.   Imaging Results          CT Abdomen Pelvis  Without Contrast (Final result)  Result time 08/21/20 23:19:23    Final result by Priyanka Smith MD (08/21/20 23:19:23)                 Impression:      No acute findings on CT abdomen and pelvis without contrast.    Punctate nonobstructing left renal calculus.      Electronically signed by: Priyanka Smith  Date:    08/21/2020  Time:    23:19             Narrative:    EXAMINATION:  CT ABDOMEN PELVIS WITHOUT    CLINICAL HISTORY:  Abdominal cramping.    TECHNIQUE:  5 mm unenhanced axial images from the lung bases through the greater trochanters were performed.  Coronal and sagittal reformatted images were provided.    COMPARISON:  None.    FINDINGS:  Within the limits of a noncontrast examination, the liver, spleen, pancreas, and adrenal glands are unremarkable.  The gallbladder contains no calcified gallstones.    There is a punctate hyperdensity in the left mid to upper pole (series 2 axial image 62).    There is no gross abdominal adenopathy or ascites.    There are no pelvic masses or adenopathy.  There is no free pelvic fluid.  The appendix is not inflamed.    At the lung bases, there is mild bibasilar atelectatic change.

## 2020-08-22 NOTE — ASSESSMENT & PLAN NOTE
- Mr. Slime Bradshaw presented with abdominal pain, and labs showed decline in renal function  - patient admits to taking NSAIDs but states only about 2x a month   - retroperitoneal US unremarkable  - avoid nephrotoxins  - renally dose meds   - consult Nephrology  - d/w Dr. Israel

## 2020-08-22 NOTE — ASSESSMENT & PLAN NOTE
- non-acute abdominal exam, improving  - CT without acute findings  - labs without acute lyte abnormalities  - urine tox neg; uses CBD gummies  - PRN pain/nausea meds   - queery renal stone?  - monitor

## 2020-08-22 NOTE — ASSESSMENT & PLAN NOTE
- Mr. Slime Bradshaw presented with abdominal pain, and labs showed decline in renal function  - patient admits to taking NSAIDs for foot pain   - urine studies ordered   - retroperitoneal US ordered   - avoid nephrotoxins  - renally dose meds   - monitor labs  - hydrate overnight  - consider Nephro consult in AM

## 2020-08-22 NOTE — ASSESSMENT & PLAN NOTE
- non-acute abdominal exam  - CT without acute findings  - labs without acute lyte abnormalities  - urine tox pending  - PRN pain/nausea meds   - suspect this may be of a muscular origin as it reproduces with movement  - monitor

## 2020-08-22 NOTE — CONSULTS
Consult Note  Nephrology    Consult Requested By: Zaire Lizarraga MD  Reason for Consult: FRENCH    SUBJECTIVE:     History of Present Illness:  Patient is a 39 y.o. male presents with Abdominal pain. He suffers from chronic back pain and on 8/17 had a Toradol injection and HPV vaccination. The afternoon of the following day he began to have cramping abdominal pain and nausea. His last Bm was either 8/18 or 8/19. The pain has persistent since then leading him to [facility] care. During this time he has hardly eaten or drank anything except for a few noodles on Friday. He is also caring for his wife who is receiving Chemo and has trach currently so he states that he even prior to this episode starting was not eating and drinking as usual. He has also not been sleeping. He denies any LUTS, myalgias, F/C/SOB, rash, edema. He underwent CT abd/pelvis without contrast and there was no evidence of obstruction but a noted stone in upper pole left kidney. UA notable for mild blood without christiano hematuria. He denies hsitory of NSAID use. Baseline Cr 1.1    History reviewed. No pertinent past medical history.  Past Surgical History:   Procedure Laterality Date    ANTERIOR CRUCIATE LIGAMENT REPAIR Right     KNEE ARTHROSCOPY W/ ACL RECONSTRUCTION      right    WISDOM TOOTH EXTRACTION       Family History   Problem Relation Age of Onset    Cancer Father         throat-- smoker    Hypertension Mother     Diabetes Mother     Hypertension Brother     Lupus Sister     No Known Problems Daughter     No Known Problems Son     COPD Maternal Grandmother     Eczema Daughter     Asthma Son     No Known Problems Son     Prostate cancer Neg Hx      Social History     Tobacco Use    Smoking status: Current Every Day Smoker     Types: Vaping w/o nicotine    Tobacco comment: No longer vapes   Substance Use Topics    Alcohol use: Not Currently     Alcohol/week: 0.0 - 1.7 standard drinks     Frequency: Never     Drinks per session:  Patient refused     Binge frequency: Never     Comment: None x 3 years     Drug use: Yes     Types: Marijuana     Comment: No longer smokes        Medications Prior to Admission   Medication Sig Dispense Refill Last Dose    atorvastatin (LIPITOR) 40 MG tablet Take 1 tablet (40 mg total) by mouth once daily. 90 tablet 3     buPROPion (WELLBUTRIN) 75 MG tablet Take 1 tablet (75 mg total) by mouth 2 (two) times daily. 60 tablet 11     ibuprofen (ADVIL,MOTRIN) 800 MG tablet Take 1 tablet (800 mg total) by mouth 3 (three) times daily. 60 tablet 5     traMADoL (ULTRAM) 50 mg tablet Take 1 tablet (50 mg total) by mouth every 6 (six) hours. 45 tablet 2     zolpidem (AMBIEN) 10 mg Tab Take 1 tablet (10 mg total) by mouth nightly as needed. 30 tablet 5        Review of patient's allergies indicates:   Allergen Reactions    Hydrocodone Itching    Iodine      Other reaction(s): Seafood - swelling, Itch        Review of Systems:  Constitutional: No fever or chills, no weight changes.  Eyes: No visual changes or photophobia  HEENT: No nasal congestion or sore throat  Respiratory: No cough or shorness of breath  Cardiovascular: No chest pain or palpitations  Gastrointestinal: Good appetite, no nausea or vomiting, no change in bowel habits  Genitourinary: No hematuria or dysuria  Skin: No rash or pruritis  Hematologic/lymphatic: No easy bruising, bleeding or lymphadenopathy  Musculoskeletal: No arthralgias or myalgias  Neurological: No seizures or tremors  Endocrine: No heat/cold intolerance.  No polyuria/polydipsia.  Psychiatric:  No depression or anxiety.     OBJECTIVE:     Vital Signs (Most Recent)  Temp: 98.1 °F (36.7 °C) (08/22/20 1217)  Pulse: (!) 57 (08/22/20 1217)  Resp: 18 (08/22/20 1217)  BP: 133/72 (08/22/20 1217)  SpO2: 97 % (08/22/20 1217)    Vital Signs Range (Last 24H):  Temp:  [98 °F (36.7 °C)-98.5 °F (36.9 °C)]   Pulse:  [55-85]   Resp:  [16-20]   BP: (133-157)/(72-92)   SpO2:  [95 %-99 %]     Physical  Exam:    General appearance: Well developed, well nourished  Head: Normocephalic, atraumatic  Eyes:  Conjunctivae nl. Sclera anicteric. PERRL.  HEENT: Lips, mucosa, and tongue normal; teeth and gums normal and oropharynx clear.  Neck: Supple, trachea midline, thyroid not enlarged,   Lungs: Clear to auscultation bilaterally and normal respiratory effort  Heart: Regular rate and rhythm, S1, S2 normal, no murmur, click, rub or gallop  Abdomen: Soft, non-tender non-distended; bowel sounds normal; no masses,  no organomegaly  Extremities: No cyanosis or clubbing. No edema  Pulses: 2+ and symmetric  Skin: Skin color, texture, turgor normal. No rashes or lesions  Lymph nodes: Cervical, supraclavicular, and axillary nodes normal.  Neurologic: Normal strength and tone. No focal numbness or weakness  Psychiatric:  Alert and oriented times 3.  Affect appropriate.     Diagnostic Results:  Labs: Reviewed  CT: Reviewed    ASSESSMENT/PLAN:     1. FRENCH  -Baseline Cr 1.1  -I suspect that he has ongoing volume depletion due to his poor intake.  -There is a remote chance that he had perhaps transient obstruction and passage of stone given residual stone disease and dipstick hematuria.  -FeNa, however, is low which can be pre-renal disease vs early other intrinsic disease.  -Uric acid is slightly elevated as well which can also go along with pre-renal disease.  -CPK is normal and UA is otherwise unremarkable.  -I would recommend additional IVFs and FU Renal funciton.  -Will also try to induce BM.

## 2020-08-22 NOTE — HPI
Mr. Slime Bradshaw is a 39 y.o. male, with PMH of mood disorder, HLD, chronic back pain, and anxiety, who presented to [facility] ED on 8/21/20 with 2 days of abdominal cramping and shooting tightness. He states the pain is worse after eating, and when he moves, and is located inferior to the belly button.  He states he had the HPV vaccine and a Toradol injection the morning the pain started. Later that day, he was constipated Wednesday, and then had loose stools after taking mag citrate. He denies blood in the stool, nausea, vomiting, chest pain, SOB, fever, chills.He denies h/o abdominal surgeries. He was evaluated it he ED with labs which showed decline in renal function with BUN 16 and Cr of 2.1 without hyperkalemia. A CT of the abdomen and pelvis showed no acute findings. After 4 mg morphine, two 1 mg doses dilaudid and a 100 mcg dose of fentanyl he was still unable to tolerate the pain and was placed on OBS.

## 2020-08-22 NOTE — PLAN OF CARE
Pt remained free of falls and injuries throughout shift. Purposeful hourly rounding performed. AAO x4. IV flushed and infusing LR at 150 mL/ hr. Managing pain/ nausea with PRN medications. Ambulates without assistance. VSS on room air. Bed low and locked, call light within reach, side rails up X2. Will continue to monitor.

## 2020-08-23 LAB
ANION GAP SERPL CALC-SCNC: 9 MMOL/L (ref 8–16)
BASOPHILS # BLD AUTO: 0.01 K/UL (ref 0–0.2)
BASOPHILS NFR BLD: 0.2 % (ref 0–1.9)
BUN SERPL-MCNC: 19 MG/DL (ref 6–20)
CALCIUM SERPL-MCNC: 8.8 MG/DL (ref 8.7–10.5)
CHLORIDE SERPL-SCNC: 108 MMOL/L (ref 95–110)
CO2 SERPL-SCNC: 24 MMOL/L (ref 23–29)
CREAT SERPL-MCNC: 2.5 MG/DL (ref 0.5–1.4)
CREAT UR-MCNC: 51.3 MG/DL (ref 23–375)
DIFFERENTIAL METHOD: ABNORMAL
EOSINOPHIL # BLD AUTO: 0.1 K/UL (ref 0–0.5)
EOSINOPHIL NFR BLD: 2.8 % (ref 0–8)
ERYTHROCYTE [DISTWIDTH] IN BLOOD BY AUTOMATED COUNT: 11.2 % (ref 11.5–14.5)
EST. GFR  (AFRICAN AMERICAN): 36 ML/MIN/1.73 M^2
EST. GFR  (NON AFRICAN AMERICAN): 31 ML/MIN/1.73 M^2
GLUCOSE SERPL-MCNC: 99 MG/DL (ref 70–110)
HCT VFR BLD AUTO: 38.8 % (ref 40–54)
HGB BLD-MCNC: 13.2 G/DL (ref 14–18)
IMM GRANULOCYTES # BLD AUTO: 0.01 K/UL (ref 0–0.04)
IMM GRANULOCYTES NFR BLD AUTO: 0.2 % (ref 0–0.5)
LYMPHOCYTES # BLD AUTO: 1.1 K/UL (ref 1–4.8)
LYMPHOCYTES NFR BLD: 24 % (ref 18–48)
MCH RBC QN AUTO: 29.7 PG (ref 27–31)
MCHC RBC AUTO-ENTMCNC: 34 G/DL (ref 32–36)
MCV RBC AUTO: 87 FL (ref 82–98)
MONOCYTES # BLD AUTO: 0.5 K/UL (ref 0.3–1)
MONOCYTES NFR BLD: 10.7 % (ref 4–15)
NEUTROPHILS # BLD AUTO: 2.9 K/UL (ref 1.8–7.7)
NEUTROPHILS NFR BLD: 62.1 % (ref 38–73)
NRBC BLD-RTO: 0 /100 WBC
PLATELET # BLD AUTO: 243 K/UL (ref 150–350)
PMV BLD AUTO: 9 FL (ref 9.2–12.9)
POTASSIUM SERPL-SCNC: 3.8 MMOL/L (ref 3.5–5.1)
RBC # BLD AUTO: 4.44 M/UL (ref 4.6–6.2)
SODIUM SERPL-SCNC: 141 MMOL/L (ref 136–145)
SODIUM UR-SCNC: 63 MMOL/L (ref 20–250)
URATE SERPL-MCNC: 8.1 MG/DL (ref 3.4–7)
WBC # BLD AUTO: 4.67 K/UL (ref 3.9–12.7)

## 2020-08-23 PROCEDURE — 25000003 PHARM REV CODE 250: Performed by: PHYSICIAN ASSISTANT

## 2020-08-23 PROCEDURE — 80048 BASIC METABOLIC PNL TOTAL CA: CPT

## 2020-08-23 PROCEDURE — 63600175 PHARM REV CODE 636 W HCPCS: Performed by: PHYSICIAN ASSISTANT

## 2020-08-23 PROCEDURE — 99226 PR SUBSEQUENT OBSERVATION CARE,LEVEL III: ICD-10-PCS | Mod: ,,, | Performed by: PHYSICIAN ASSISTANT

## 2020-08-23 PROCEDURE — 84300 ASSAY OF URINE SODIUM: CPT

## 2020-08-23 PROCEDURE — 96361 HYDRATE IV INFUSION ADD-ON: CPT

## 2020-08-23 PROCEDURE — 63600175 PHARM REV CODE 636 W HCPCS: Performed by: INTERNAL MEDICINE

## 2020-08-23 PROCEDURE — 96376 TX/PRO/DX INJ SAME DRUG ADON: CPT

## 2020-08-23 PROCEDURE — 85025 COMPLETE CBC W/AUTO DIFF WBC: CPT

## 2020-08-23 PROCEDURE — 94761 N-INVAS EAR/PLS OXIMETRY MLT: CPT

## 2020-08-23 PROCEDURE — 84550 ASSAY OF BLOOD/URIC ACID: CPT

## 2020-08-23 PROCEDURE — G0378 HOSPITAL OBSERVATION PER HR: HCPCS

## 2020-08-23 PROCEDURE — 96365 THER/PROPH/DIAG IV INF INIT: CPT | Mod: 59

## 2020-08-23 PROCEDURE — 82570 ASSAY OF URINE CREATININE: CPT

## 2020-08-23 PROCEDURE — 99226 PR SUBSEQUENT OBSERVATION CARE,LEVEL III: CPT | Mod: ,,, | Performed by: PHYSICIAN ASSISTANT

## 2020-08-23 RX ADMIN — DICYCLOMINE HYDROCHLORIDE 10 MG: 10 CAPSULE ORAL at 01:08

## 2020-08-23 RX ADMIN — ACETAMINOPHEN 650 MG: 325 TABLET, FILM COATED ORAL at 03:08

## 2020-08-23 RX ADMIN — TRAMADOL HYDROCHLORIDE 50 MG: 50 TABLET, FILM COATED ORAL at 10:08

## 2020-08-23 RX ADMIN — SODIUM CHLORIDE, SODIUM LACTATE, POTASSIUM CHLORIDE, AND CALCIUM CHLORIDE: .6; .31; .03; .02 INJECTION, SOLUTION INTRAVENOUS at 06:08

## 2020-08-23 RX ADMIN — ALUMINUM HYDROXIDE, MAGNESIUM HYDROXIDE, AND SIMETHICONE 30 ML: 200; 200; 20 SUSPENSION ORAL at 05:08

## 2020-08-23 RX ADMIN — PROMETHAZINE HYDROCHLORIDE 12.5 MG: 25 INJECTION INTRAMUSCULAR; INTRAVENOUS at 01:08

## 2020-08-23 RX ADMIN — SODIUM CHLORIDE, SODIUM LACTATE, POTASSIUM CHLORIDE, AND CALCIUM CHLORIDE: .6; .31; .03; .02 INJECTION, SOLUTION INTRAVENOUS at 09:08

## 2020-08-23 RX ADMIN — ALUMINUM HYDROXIDE, MAGNESIUM HYDROXIDE, AND SIMETHICONE 30 ML: 200; 200; 20 SUSPENSION ORAL at 09:08

## 2020-08-23 RX ADMIN — SODIUM CHLORIDE, SODIUM LACTATE, POTASSIUM CHLORIDE, AND CALCIUM CHLORIDE: .6; .31; .03; .02 INJECTION, SOLUTION INTRAVENOUS at 02:08

## 2020-08-23 RX ADMIN — ALUMINUM HYDROXIDE, MAGNESIUM HYDROXIDE, AND SIMETHICONE 30 ML: 200; 200; 20 SUSPENSION ORAL at 06:08

## 2020-08-23 RX ADMIN — TRAMADOL HYDROCHLORIDE 50 MG: 50 TABLET, FILM COATED ORAL at 04:08

## 2020-08-23 RX ADMIN — BUPROPION HYDROCHLORIDE 75 MG: 75 TABLET, FILM COATED ORAL at 08:08

## 2020-08-23 RX ADMIN — SODIUM CHLORIDE, SODIUM LACTATE, POTASSIUM CHLORIDE, AND CALCIUM CHLORIDE: .6; .31; .03; .02 INJECTION, SOLUTION INTRAVENOUS at 12:08

## 2020-08-23 RX ADMIN — ACETAMINOPHEN 650 MG: 325 TABLET, FILM COATED ORAL at 10:08

## 2020-08-23 RX ADMIN — ALUMINUM HYDROXIDE, MAGNESIUM HYDROXIDE, AND SIMETHICONE 30 ML: 200; 200; 20 SUSPENSION ORAL at 11:08

## 2020-08-23 RX ADMIN — PROMETHAZINE HYDROCHLORIDE 12.5 MG: 25 INJECTION INTRAMUSCULAR; INTRAVENOUS at 09:08

## 2020-08-23 RX ADMIN — DICYCLOMINE HYDROCHLORIDE 10 MG: 10 CAPSULE ORAL at 08:08

## 2020-08-23 NOTE — PLAN OF CARE
Pt remained free of falls and injuries throughout shift. Purposeful hourly rounding performed. AAO x4. IV flushed and infusing LR at 150 mL/hr. Managing pain with PRN medications. Ambulates without assistance. VSS on room air. Bed low and locked, call light within reach, side rails up X2. Will continue to monitor.

## 2020-08-23 NOTE — SUBJECTIVE & OBJECTIVE
Interval History: feels better, no abd pain    Review of Systems   Constitutional: Negative for appetite change, chills and fever.   HENT: Negative for congestion, rhinorrhea and sore throat.    Eyes: Negative.    Respiratory: Negative for cough, shortness of breath and wheezing.    Cardiovascular: Negative for chest pain, palpitations and leg swelling.   Gastrointestinal: Negative for abdominal distention, abdominal pain, constipation, diarrhea, nausea and vomiting.   Endocrine: Negative for polydipsia and polyuria.   Genitourinary: Negative for difficulty urinating, frequency and hematuria.   Musculoskeletal: Negative for back pain and neck pain.   Skin: Negative.    Neurological: Negative for dizziness, syncope, weakness, light-headedness and headaches.     Objective:     Vital Signs (Most Recent):  Temp: 97.5 °F (36.4 °C) (08/23/20 1521)  Pulse: 65 (08/23/20 1521)  Resp: 16 (08/23/20 1521)  BP: (!) 148/88 (08/23/20 1521)  SpO2: 96 % (08/23/20 1521) Vital Signs (24h Range):  Temp:  [97.5 °F (36.4 °C)-98.4 °F (36.9 °C)] 97.5 °F (36.4 °C)  Pulse:  [60-71] 65  Resp:  [16-22] 16  SpO2:  [96 %-97 %] 96 %  BP: (135-157)/(76-88) 148/88     Weight: 103 kg (227 lb)  Body mass index is 29.15 kg/m².    Intake/Output Summary (Last 24 hours) at 8/23/2020 1734  Last data filed at 8/23/2020 1548  Gross per 24 hour   Intake 3067 ml   Output 2051 ml   Net 1016 ml      Physical Exam  Vitals signs and nursing note reviewed.   Constitutional:       General: He is not in acute distress.     Appearance: He is well-developed. He is not ill-appearing, toxic-appearing or diaphoretic.   HENT:      Head: Normocephalic and atraumatic.      Right Ear: External ear normal.      Left Ear: External ear normal.   Eyes:      General: No scleral icterus.  Neck:      Musculoskeletal: Normal range of motion and neck supple.      Vascular: No JVD.      Trachea: No tracheal deviation.   Cardiovascular:      Rate and Rhythm: Normal rate and regular  rhythm.      Heart sounds: Normal heart sounds. No murmur.   Pulmonary:      Effort: Pulmonary effort is normal. No respiratory distress.      Breath sounds: Normal breath sounds. No rales.   Abdominal:      General: Bowel sounds are normal. There is no distension.      Palpations: Abdomen is soft.      Tenderness: There is no abdominal tenderness.   Musculoskeletal: Normal range of motion.         General: No deformity.   Skin:     General: Skin is warm and dry.   Neurological:      Mental Status: He is alert and oriented to person, place, and time.      Motor: No abnormal muscle tone.   Psychiatric:         Behavior: Behavior normal.         Significant Labs:   CBC:   Recent Labs   Lab 08/21/20 2058 08/22/20  0338 08/23/20  0616   WBC 5.57 5.69 4.67   HGB 14.5 13.9* 13.2*   HCT 42.7 41.2 38.8*    263 243     CMP:   Recent Labs   Lab 08/21/20 2058 08/22/20 0338 08/22/20  1325 08/23/20  0616    142 141 141   K 4.1 3.7 4.2 3.8    107 106 108   CO2 27 23 26 24    105 90 99   BUN 16 17 19 19   CREATININE 2.1* 2.2* 2.3* 2.5*   CALCIUM 9.1 8.8 8.8 8.8   PROT 7.2  --   --   --    ALBUMIN 3.7  --   --   --    BILITOT 0.6  --   --   --    ALKPHOS 55  --   --   --    AST 23  --   --   --    ALT 22  --   --   --    ANIONGAP 9 12 9 9   EGFRNONAA 38* 36* 34* 31*     All pertinent labs within the past 24 hours have been reviewed.    Significant Imaging:    Imaging Results          CT Abdomen Pelvis  Without Contrast (Final result)  Result time 08/21/20 23:19:23    Final result by Priyanka Smith MD (08/21/20 23:19:23)                 Impression:      No acute findings on CT abdomen and pelvis without contrast.    Punctate nonobstructing left renal calculus.      Electronically signed by: Priyanka Smith  Date:    08/21/2020  Time:    23:19             Narrative:    EXAMINATION:  CT ABDOMEN PELVIS WITHOUT    CLINICAL HISTORY:  Abdominal cramping.    TECHNIQUE:  5 mm unenhanced axial images from  the lung bases through the greater trochanters were performed.  Coronal and sagittal reformatted images were provided.    COMPARISON:  None.    FINDINGS:  Within the limits of a noncontrast examination, the liver, spleen, pancreas, and adrenal glands are unremarkable.  The gallbladder contains no calcified gallstones.    There is a punctate hyperdensity in the left mid to upper pole (series 2 axial image 62).    There is no gross abdominal adenopathy or ascites.    There are no pelvic masses or adenopathy.  There is no free pelvic fluid.  The appendix is not inflamed.    At the lung bases, there is mild bibasilar atelectatic change.

## 2020-08-23 NOTE — PROGRESS NOTES
[facility] Hospital Medicine  Progress Note    Patient Name: Slime Bradshaw  MRN: [id number]  Patient Class: OP- Observation   Admission Date: 8/21/2020  Length of Stay: 0 days  Attending Physician: Zaire Lizarraga MD  Primary Care Provider: Lindsey Lozoya MD        Subjective:     Principal Problem:FRENCH (acute kidney injury)        HPI:  Mr. Slime Bradshaw is a 39 y.o. male, with PMH of mood disorder, HLD, chronic back pain, and anxiety, who presented to [facility] ED on 8/21/20 with 2 days of abdominal cramping and shooting tightness. He states the pain is worse after eating, and when he moves, and is located inferior to the belly button.  He states he had the HPV vaccine and a Toradol injection the morning the pain started. Later that day, he was constipated Wednesday, and then had loose stools after taking mag citrate. He denies blood in the stool, nausea, vomiting, chest pain, SOB, fever, chills.He denies h/o abdominal surgeries. He was evaluated it he ED with labs which showed decline in renal function with BUN 16 and Cr of 2.1 without hyperkalemia. A CT of the abdomen and pelvis showed no acute findings. After 4 mg morphine, two 1 mg doses dilaudid and a 100 mcg dose of fentanyl he was still unable to tolerate the pain and was placed on OBS.     Overview/Hospital Course:  No notes on file    Interval History: feels better, no abd pain    Review of Systems   Constitutional: Negative for appetite change, chills and fever.   HENT: Negative for congestion, rhinorrhea and sore throat.    Eyes: Negative.    Respiratory: Negative for cough, shortness of breath and wheezing.    Cardiovascular: Negative for chest pain, palpitations and leg swelling.   Gastrointestinal: Negative for abdominal distention, abdominal pain, constipation, diarrhea, nausea and vomiting.   Endocrine: Negative for polydipsia and polyuria.   Genitourinary: Negative for difficulty urinating, frequency and hematuria.    Musculoskeletal: Negative for back pain and neck pain.   Skin: Negative.    Neurological: Negative for dizziness, syncope, weakness, light-headedness and headaches.     Objective:     Vital Signs (Most Recent):  Temp: 97.5 °F (36.4 °C) (08/23/20 1521)  Pulse: 65 (08/23/20 1521)  Resp: 16 (08/23/20 1521)  BP: (!) 148/88 (08/23/20 1521)  SpO2: 96 % (08/23/20 1521) Vital Signs (24h Range):  Temp:  [97.5 °F (36.4 °C)-98.4 °F (36.9 °C)] 97.5 °F (36.4 °C)  Pulse:  [60-71] 65  Resp:  [16-22] 16  SpO2:  [96 %-97 %] 96 %  BP: (135-157)/(76-88) 148/88     Weight: 103 kg (227 lb)  Body mass index is 29.15 kg/m².    Intake/Output Summary (Last 24 hours) at 8/23/2020 1734  Last data filed at 8/23/2020 1548  Gross per 24 hour   Intake 3067 ml   Output 2051 ml   Net 1016 ml      Physical Exam  Vitals signs and nursing note reviewed.   Constitutional:       General: He is not in acute distress.     Appearance: He is well-developed. He is not ill-appearing, toxic-appearing or diaphoretic.   HENT:      Head: Normocephalic and atraumatic.      Right Ear: External ear normal.      Left Ear: External ear normal.   Eyes:      General: No scleral icterus.  Neck:      Musculoskeletal: Normal range of motion and neck supple.      Vascular: No JVD.      Trachea: No tracheal deviation.   Cardiovascular:      Rate and Rhythm: Normal rate and regular rhythm.      Heart sounds: Normal heart sounds. No murmur.   Pulmonary:      Effort: Pulmonary effort is normal. No respiratory distress.      Breath sounds: Normal breath sounds. No rales.   Abdominal:      General: Bowel sounds are normal. There is no distension.      Palpations: Abdomen is soft.      Tenderness: There is no abdominal tenderness.   Musculoskeletal: Normal range of motion.         General: No deformity.   Skin:     General: Skin is warm and dry.   Neurological:      Mental Status: He is alert and oriented to person, place, and time.      Motor: No abnormal muscle tone.    Psychiatric:         Behavior: Behavior normal.         Significant Labs:   CBC:   Recent Labs   Lab 08/21/20 2058 08/22/20  0338 08/23/20  0616   WBC 5.57 5.69 4.67   HGB 14.5 13.9* 13.2*   HCT 42.7 41.2 38.8*    263 243     CMP:   Recent Labs   Lab 08/21/20 2058 08/22/20  0338 08/22/20  1325 08/23/20  0616    142 141 141   K 4.1 3.7 4.2 3.8    107 106 108   CO2 27 23 26 24    105 90 99   BUN 16 17 19 19   CREATININE 2.1* 2.2* 2.3* 2.5*   CALCIUM 9.1 8.8 8.8 8.8   PROT 7.2  --   --   --    ALBUMIN 3.7  --   --   --    BILITOT 0.6  --   --   --    ALKPHOS 55  --   --   --    AST 23  --   --   --    ALT 22  --   --   --    ANIONGAP 9 12 9 9   EGFRNONAA 38* 36* 34* 31*     All pertinent labs within the past 24 hours have been reviewed.    Significant Imaging:    Imaging Results          CT Abdomen Pelvis  Without Contrast (Final result)  Result time 08/21/20 23:19:23    Final result by Priyanka Smith MD (08/21/20 23:19:23)                 Impression:      No acute findings on CT abdomen and pelvis without contrast.    Punctate nonobstructing left renal calculus.      Electronically signed by: Priyanka Smith  Date:    08/21/2020  Time:    23:19             Narrative:    EXAMINATION:  CT ABDOMEN PELVIS WITHOUT    CLINICAL HISTORY:  Abdominal cramping.    TECHNIQUE:  5 mm unenhanced axial images from the lung bases through the greater trochanters were performed.  Coronal and sagittal reformatted images were provided.    COMPARISON:  None.    FINDINGS:  Within the limits of a noncontrast examination, the liver, spleen, pancreas, and adrenal glands are unremarkable.  The gallbladder contains no calcified gallstones.    There is a punctate hyperdensity in the left mid to upper pole (series 2 axial image 62).    There is no gross abdominal adenopathy or ascites.    There are no pelvic masses or adenopathy.  There is no free pelvic fluid.  The appendix is not inflamed.    At the lung bases,  there is mild bibasilar atelectatic change.                                    Assessment/Plan:      * FRENCH (acute kidney injury)  - Mr. Slime Bradshaw presented with abdominal pain, and labs showed decline in renal function  - patient admits to taking NSAIDs but states only about 2x a month; possible volume depletion   - retroperitoneal US unremarkable  - CK and UA normal  - possible ATN  - avoid nephrotoxins  - renally dose meds   - d/w Dr. Israel    Lower abdominal pain  - non-acute abdominal exam, improving  - CT without acute findings  - labs without acute lyte abnormalities  - urine tox neg; uses CBD gummies  - PRN pain/nausea meds   - queery renal stone?  - monitor       Essential hypertension  - reasonably stable, exacerbated with pain  - no listed home meds  - monitor       VTE Risk Mitigation (From admission, onward)         Ordered     heparin (porcine) injection 5,000 Units  Every 8 hours      08/22/20 0215     IP VTE LOW RISK PATIENT  Once      08/22/20 0233     Place sequential compression device  Until discontinued      08/22/20 0233                Discharge Planning   DINA:      Code Status: Full Code   Is the patient medically ready for discharge?:     Reason for patient still in hospital (select all that apply): Patient trending condition, Treatment and Consult recommendations  Discharge Plan A: Home                  Shonna Villarreal PA-C  Department of Hospital Medicine   [facility]

## 2020-08-23 NOTE — PLAN OF CARE
Pt is AAOx4, RA. 22G RFA infusing. Ambulates independently. Pain tolerable but increased through night. PRN meds administered. Pt took shower, heat packs applied to abdomen and fluids given. Pt refused scheduled BID med. See MAR.     *POC reviewed with pt. Purposeful hourly rounding done. No falls or injuries on shift. Bed low and locked. Call light in reach. Personal belongings at BS. Will continue to monitor.

## 2020-08-23 NOTE — ASSESSMENT & PLAN NOTE
- Mr. Slime Bradshaw presented with abdominal pain, and labs showed decline in renal function  - patient admits to taking NSAIDs but states only about 2x a month; possible volume depletion   - retroperitoneal US unremarkable  - CK and UA normal  - possible ATN  - avoid nephrotoxins  - renally dose meds   - d/w Dr. Israel

## 2020-08-23 NOTE — PROGRESS NOTES
Consult Note  Nephrology    Consult Requested By: Zaire Lizarraga MD  Reason for Consult: FRENCH    SUBJECTIVE:     History of Present Illness:  Patient is a 39 y.o. male presents with Abdominal pain. He suffers from chronic back pain and on 8/17 had a Toradol injection and HPV vaccination. The afternoon of the following day he began to have cramping abdominal pain and nausea. His last Bm was either 8/18 or 8/19. The pain has persistent since then leading him to [facility] care. During this time he has hardly eaten or drank anything except for a few noodles on Friday. He is also caring for his wife who is receiving Chemo and has trach currently so he states that he even prior to this episode starting was not eating and drinking as usual. He has also not been sleeping. He denies any LUTS, myalgias, F/C/SOB, rash, edema. He underwent CT abd/pelvis without contrast and there was no evidence of obstruction but a noted stone in upper pole left kidney. UA notable for mild blood without christiano hematuria. He denies hsitory of NSAID use. Baseline Cr 1.1    Interval History:  Still with Abd pain, but less and poor intake. Net + 3L since admit. Had small stool am after Lactulose.        History reviewed. No pertinent past medical history.  Past Surgical History:   Procedure Laterality Date    ANTERIOR CRUCIATE LIGAMENT REPAIR Right     KNEE ARTHROSCOPY W/ ACL RECONSTRUCTION      right    WISDOM TOOTH EXTRACTION       Family History   Problem Relation Age of Onset    Cancer Father         throat-- smoker    Hypertension Mother     Diabetes Mother     Hypertension Brother     Lupus Sister     No Known Problems Daughter     No Known Problems Son     COPD Maternal Grandmother     Eczema Daughter     Asthma Son     No Known Problems Son     Prostate cancer Neg Hx      Social History     Tobacco Use    Smoking status: Current Every Day Smoker     Types: Vaping w/o nicotine    Tobacco comment: No longer vapes   Substance Use  Topics    Alcohol use: Not Currently     Alcohol/week: 0.0 - 1.7 standard drinks     Frequency: Never     Drinks per session: Patient refused     Binge frequency: Never     Comment: None x 3 years     Drug use: Yes     Types: Marijuana     Comment: No longer smokes        Medications Prior to Admission   Medication Sig Dispense Refill Last Dose    atorvastatin (LIPITOR) 40 MG tablet Take 1 tablet (40 mg total) by mouth once daily. 90 tablet 3     buPROPion (WELLBUTRIN) 75 MG tablet Take 1 tablet (75 mg total) by mouth 2 (two) times daily. 60 tablet 11     ibuprofen (ADVIL,MOTRIN) 800 MG tablet Take 1 tablet (800 mg total) by mouth 3 (three) times daily. 60 tablet 5     traMADoL (ULTRAM) 50 mg tablet Take 1 tablet (50 mg total) by mouth every 6 (six) hours. 45 tablet 2     zolpidem (AMBIEN) 10 mg Tab Take 1 tablet (10 mg total) by mouth nightly as needed. 30 tablet 5        Review of patient's allergies indicates:   Allergen Reactions    Hydrocodone Itching    Iodine      Other reaction(s): Seafood - swelling, Itch        Review of Systems:  Constitutional: No fever or chills, no weight changes.  Eyes: No visual changes or photophobia  HEENT: No nasal congestion or sore throat  Respiratory: No cough or shorness of breath  Cardiovascular: No chest pain or palpitations  Gastrointestinal: Good appetite, no nausea or vomiting, no change in bowel habits  Genitourinary: No hematuria or dysuria  Skin: No rash or pruritis  Hematologic/lymphatic: No easy bruising, bleeding or lymphadenopathy  Musculoskeletal: No arthralgias or myalgias  Neurological: No seizures or tremors  Endocrine: No heat/cold intolerance.  No polyuria/polydipsia.  Psychiatric:  No depression or anxiety.     OBJECTIVE:     Vital Signs (Most Recent)  Temp: 98 °F (36.7 °C) (08/23/20 1139)  Pulse: 61 (08/23/20 1139)  Resp: 18 (08/23/20 1139)  BP: (!) 145/80 (08/23/20 1139)  SpO2: 96 % (08/23/20 1139)    Vital Signs Range (Last 24H):  Temp:  [97.6 °F  (36.4 °C)-98.4 °F (36.9 °C)]   Pulse:  [57-71]   Resp:  [16-22]   BP: (133-157)/(72-95)   SpO2:  [96 %-98 %]     Physical Exam:    General appearance: Well developed, well nourished  Head: Normocephalic, atraumatic  Eyes:  Conjunctivae nl. Sclera anicteric. PERRL.  HEENT: Lips, mucosa, and tongue normal; teeth and gums normal and oropharynx clear.  Neck: Supple, trachea midline, thyroid not enlarged,   Lungs: Clear to auscultation bilaterally and normal respiratory effort  Heart: Regular rate and rhythm, S1, S2 normal, no murmur, click, rub or gallop  Abdomen: Soft, non-tender non-distended; bowel sounds normal; no masses,  no organomegaly  Extremities: No cyanosis or clubbing. No edema  Pulses: 2+ and symmetric  Skin: Skin color, texture, turgor normal. No rashes or lesions  Lymph nodes: Cervical, supraclavicular, and axillary nodes normal.  Neurologic: Normal strength and tone. No focal numbness or weakness  Psychiatric:  Alert and oriented times 3.  Affect appropriate.     Diagnostic Results:  Labs: Reviewed  CT: Reviewed    ASSESSMENT/PLAN:     1. FRENCH  -Baseline Cr 1.1  -I suspect that he has ongoing volume depletion due to his poor intake and perhaps Toradol effect.  -There is a remote chance that he had perhaps transient obstruction and passage of stone given residual stone disease and dipstick hematuria.  -Initial FeNa was low which can be pre-renal disease vs early other intrinsic disease. Uirne Na today is elevated and may reflect IVFs/ improving volume status.  -Awaiting urine eosin, but no peripheral eosinophilia.  -Uric acid is slightly elevated as well which can also go along with pre-renal disease.  -CPK is normal and UA is otherwise unremarkable.  -Had BM, but small. Will give additional dose Lactulose today.  -I would recommend additional IVFs and FU Renal funciton.  -At this point he may have ATN  Will see.

## 2020-08-24 ENCOUNTER — HOSPITAL ENCOUNTER (EMERGENCY)
Facility: OTHER | Age: 40
Discharge: HOME | End: 2020-08-24
Attending: EMERGENCY MEDICINE
Payer: MEDICAID

## 2020-08-24 VITALS
TEMPERATURE: 99 F | BODY MASS INDEX: 29.13 KG/M2 | DIASTOLIC BLOOD PRESSURE: 91 MMHG | SYSTOLIC BLOOD PRESSURE: 158 MMHG | OXYGEN SATURATION: 95 % | HEART RATE: 60 BPM | RESPIRATION RATE: 18 BRPM | WEIGHT: 227 LBS | HEIGHT: 74 IN

## 2020-08-24 VITALS
OXYGEN SATURATION: 95 % | BODY MASS INDEX: 29.13 KG/M2 | HEART RATE: 54 BPM | DIASTOLIC BLOOD PRESSURE: 93 MMHG | SYSTOLIC BLOOD PRESSURE: 160 MMHG | HEIGHT: 74 IN | RESPIRATION RATE: 16 BRPM | TEMPERATURE: 98 F | WEIGHT: 227 LBS

## 2020-08-24 DIAGNOSIS — R03.0 ELEVATED BLOOD PRESSURE READING: Primary | ICD-10-CM

## 2020-08-24 DIAGNOSIS — R07.89 CHEST DISCOMFORT: ICD-10-CM

## 2020-08-24 DIAGNOSIS — R10.10 UPPER ABDOMINAL PAIN: ICD-10-CM

## 2020-08-24 LAB
ALBUMIN SERPL BCP-MCNC: 3.2 G/DL (ref 3.5–5.2)
ALP SERPL-CCNC: 54 U/L (ref 55–135)
ALT SERPL W/O P-5'-P-CCNC: 21 U/L (ref 10–44)
ANION GAP SERPL CALC-SCNC: 10 MMOL/L (ref 8–16)
ANION GAP SERPL CALC-SCNC: 9 MMOL/L (ref 8–16)
AST SERPL-CCNC: 17 U/L (ref 10–40)
BASOPHILS # BLD AUTO: 0.01 K/UL (ref 0–0.2)
BASOPHILS # BLD AUTO: 0.01 K/UL (ref 0–0.2)
BASOPHILS NFR BLD: 0.2 % (ref 0–1.9)
BASOPHILS NFR BLD: 0.2 % (ref 0–1.9)
BILIRUB SERPL-MCNC: 0.5 MG/DL (ref 0.1–1)
BILIRUB UR QL STRIP: NEGATIVE
BUN SERPL-MCNC: 17 MG/DL (ref 6–20)
BUN SERPL-MCNC: 17 MG/DL (ref 6–20)
CALCIUM SERPL-MCNC: 8.8 MG/DL (ref 8.7–10.5)
CALCIUM SERPL-MCNC: 8.8 MG/DL (ref 8.7–10.5)
CHLORIDE SERPL-SCNC: 103 MMOL/L (ref 95–110)
CHLORIDE SERPL-SCNC: 106 MMOL/L (ref 95–110)
CK SERPL-CCNC: 113 U/L (ref 20–200)
CLARITY UR: CLEAR
CO2 SERPL-SCNC: 27 MMOL/L (ref 23–29)
CO2 SERPL-SCNC: 29 MMOL/L (ref 23–29)
COLOR UR: YELLOW
CREAT SERPL-MCNC: 2.6 MG/DL (ref 0.5–1.4)
CREAT SERPL-MCNC: 2.7 MG/DL (ref 0.5–1.4)
DIFFERENTIAL METHOD: ABNORMAL
DIFFERENTIAL METHOD: ABNORMAL
EOSINOPHIL # BLD AUTO: 0.2 K/UL (ref 0–0.5)
EOSINOPHIL # BLD AUTO: 0.2 K/UL (ref 0–0.5)
EOSINOPHIL NFR BLD: 3.2 % (ref 0–8)
EOSINOPHIL NFR BLD: 4.2 % (ref 0–8)
ERYTHROCYTE [DISTWIDTH] IN BLOOD BY AUTOMATED COUNT: 10.9 % (ref 11.5–14.5)
ERYTHROCYTE [DISTWIDTH] IN BLOOD BY AUTOMATED COUNT: 11 % (ref 11.5–14.5)
EST. GFR  (AFRICAN AMERICAN): 33 ML/MIN/1.73 M^2
EST. GFR  (AFRICAN AMERICAN): 34 ML/MIN/1.73 M^2
EST. GFR  (NON AFRICAN AMERICAN): 28 ML/MIN/1.73 M^2
EST. GFR  (NON AFRICAN AMERICAN): 30 ML/MIN/1.73 M^2
GLUCOSE SERPL-MCNC: 121 MG/DL (ref 70–110)
GLUCOSE SERPL-MCNC: 95 MG/DL (ref 70–110)
GLUCOSE UR QL STRIP: NEGATIVE
HCT VFR BLD AUTO: 37.5 % (ref 40–54)
HCT VFR BLD AUTO: 38 % (ref 40–54)
HGB BLD-MCNC: 12.7 G/DL (ref 14–18)
HGB BLD-MCNC: 13.1 G/DL (ref 14–18)
HGB UR QL STRIP: ABNORMAL
IMM GRANULOCYTES # BLD AUTO: 0 K/UL (ref 0–0.04)
IMM GRANULOCYTES # BLD AUTO: 0.01 K/UL (ref 0–0.04)
IMM GRANULOCYTES NFR BLD AUTO: 0 % (ref 0–0.5)
IMM GRANULOCYTES NFR BLD AUTO: 0.2 % (ref 0–0.5)
KETONES UR QL STRIP: NEGATIVE
LEUKOCYTE ESTERASE UR QL STRIP: NEGATIVE
LIPASE SERPL-CCNC: 13 U/L (ref 4–60)
LYMPHOCYTES # BLD AUTO: 1.3 K/UL (ref 1–4.8)
LYMPHOCYTES # BLD AUTO: 1.3 K/UL (ref 1–4.8)
LYMPHOCYTES NFR BLD: 24.1 % (ref 18–48)
LYMPHOCYTES NFR BLD: 30.7 % (ref 18–48)
MCH RBC QN AUTO: 29.7 PG (ref 27–31)
MCH RBC QN AUTO: 29.9 PG (ref 27–31)
MCHC RBC AUTO-ENTMCNC: 33.9 G/DL (ref 32–36)
MCHC RBC AUTO-ENTMCNC: 34.5 G/DL (ref 32–36)
MCV RBC AUTO: 87 FL (ref 82–98)
MCV RBC AUTO: 88 FL (ref 82–98)
MONOCYTES # BLD AUTO: 0.4 K/UL (ref 0.3–1)
MONOCYTES # BLD AUTO: 0.4 K/UL (ref 0.3–1)
MONOCYTES NFR BLD: 7.8 % (ref 4–15)
MONOCYTES NFR BLD: 9.2 % (ref 4–15)
NEUTROPHILS # BLD AUTO: 2.4 K/UL (ref 1.8–7.7)
NEUTROPHILS # BLD AUTO: 3.4 K/UL (ref 1.8–7.7)
NEUTROPHILS NFR BLD: 55.5 % (ref 38–73)
NEUTROPHILS NFR BLD: 64.7 % (ref 38–73)
NITRITE UR QL STRIP: NEGATIVE
NRBC BLD-RTO: 0 /100 WBC
NRBC BLD-RTO: 0 /100 WBC
PH UR STRIP: 8 [PH] (ref 5–8)
PLATELET # BLD AUTO: 254 K/UL (ref 150–350)
PLATELET # BLD AUTO: 265 K/UL (ref 150–350)
PMV BLD AUTO: 8.9 FL (ref 9.2–12.9)
PMV BLD AUTO: 9.4 FL (ref 9.2–12.9)
POTASSIUM SERPL-SCNC: 3.9 MMOL/L (ref 3.5–5.1)
POTASSIUM SERPL-SCNC: 4 MMOL/L (ref 3.5–5.1)
PROT SERPL-MCNC: 6.8 G/DL (ref 6–8.4)
PROT UR QL STRIP: NEGATIVE
RBC # BLD AUTO: 4.28 M/UL (ref 4.6–6.2)
RBC # BLD AUTO: 4.38 M/UL (ref 4.6–6.2)
SODIUM SERPL-SCNC: 142 MMOL/L (ref 136–145)
SODIUM SERPL-SCNC: 142 MMOL/L (ref 136–145)
SP GR UR STRIP: 1.01 (ref 1–1.03)
TROPONIN I SERPL DL<=0.01 NG/ML-MCNC: <0.006 NG/ML (ref 0–0.03)
URN SPEC COLLECT METH UR: ABNORMAL
UROBILINOGEN UR STRIP-ACNC: NEGATIVE EU/DL
WBC # BLD AUTO: 4.33 K/UL (ref 3.9–12.7)
WBC # BLD AUTO: 5.26 K/UL (ref 3.9–12.7)

## 2020-08-24 PROCEDURE — 85025 COMPLETE CBC W/AUTO DIFF WBC: CPT | Mod: 91

## 2020-08-24 PROCEDURE — 63600175 PHARM REV CODE 636 W HCPCS: Performed by: INTERNAL MEDICINE

## 2020-08-24 PROCEDURE — 36415 COLL VENOUS BLD VENIPUNCTURE: CPT

## 2020-08-24 PROCEDURE — 94761 N-INVAS EAR/PLS OXIMETRY MLT: CPT

## 2020-08-24 PROCEDURE — 25000003 PHARM REV CODE 250: Performed by: NURSE PRACTITIONER

## 2020-08-24 PROCEDURE — 25000003 PHARM REV CODE 250: Performed by: PHYSICIAN ASSISTANT

## 2020-08-24 PROCEDURE — 96361 HYDRATE IV INFUSION ADD-ON: CPT

## 2020-08-24 PROCEDURE — 83690 ASSAY OF LIPASE: CPT

## 2020-08-24 PROCEDURE — 99285 EMERGENCY DEPT VISIT HI MDM: CPT | Mod: 25

## 2020-08-24 PROCEDURE — 80053 COMPREHEN METABOLIC PANEL: CPT

## 2020-08-24 PROCEDURE — 93005 ELECTROCARDIOGRAM TRACING: CPT

## 2020-08-24 PROCEDURE — 82550 ASSAY OF CK (CPK): CPT

## 2020-08-24 PROCEDURE — G0378 HOSPITAL OBSERVATION PER HR: HCPCS

## 2020-08-24 PROCEDURE — 99226 PR SUBSEQUENT OBSERVATION CARE,LEVEL III: CPT | Mod: ,,, | Performed by: PHYSICIAN ASSISTANT

## 2020-08-24 PROCEDURE — 85025 COMPLETE CBC W/AUTO DIFF WBC: CPT

## 2020-08-24 PROCEDURE — 84484 ASSAY OF TROPONIN QUANT: CPT

## 2020-08-24 PROCEDURE — 99226 PR SUBSEQUENT OBSERVATION CARE,LEVEL III: ICD-10-PCS | Mod: ,,, | Performed by: PHYSICIAN ASSISTANT

## 2020-08-24 PROCEDURE — 93010 EKG 12-LEAD: ICD-10-PCS | Mod: ,,, | Performed by: INTERNAL MEDICINE

## 2020-08-24 PROCEDURE — 93010 ELECTROCARDIOGRAM REPORT: CPT | Mod: ,,, | Performed by: INTERNAL MEDICINE

## 2020-08-24 PROCEDURE — 80048 BASIC METABOLIC PNL TOTAL CA: CPT

## 2020-08-24 PROCEDURE — 81003 URINALYSIS AUTO W/O SCOPE: CPT

## 2020-08-24 RX ORDER — AMLODIPINE BESYLATE 5 MG/1
5 TABLET ORAL DAILY
Qty: 30 TABLET | Refills: 0 | Status: SHIPPED | OUTPATIENT
Start: 2020-08-24 | End: 2021-04-21

## 2020-08-24 RX ORDER — AMLODIPINE BESYLATE 5 MG/1
5 TABLET ORAL
Status: COMPLETED | OUTPATIENT
Start: 2020-08-24 | End: 2020-08-24

## 2020-08-24 RX ADMIN — TRAMADOL HYDROCHLORIDE 50 MG: 50 TABLET, FILM COATED ORAL at 06:08

## 2020-08-24 RX ADMIN — ACETAMINOPHEN 650 MG: 325 TABLET, FILM COATED ORAL at 12:08

## 2020-08-24 RX ADMIN — ALUMINUM HYDROXIDE, MAGNESIUM HYDROXIDE, DIMETHICONE 50 ML: 200; 200; 20 LIQUID ORAL at 12:08

## 2020-08-24 RX ADMIN — AMLODIPINE BESYLATE 5 MG: 5 TABLET ORAL at 10:08

## 2020-08-24 RX ADMIN — SODIUM CHLORIDE, SODIUM LACTATE, POTASSIUM CHLORIDE, AND CALCIUM CHLORIDE: .6; .31; .03; .02 INJECTION, SOLUTION INTRAVENOUS at 04:08

## 2020-08-24 RX ADMIN — ALUMINUM HYDROXIDE, MAGNESIUM HYDROXIDE, AND SIMETHICONE 30 ML: 200; 200; 20 SUSPENSION ORAL at 12:08

## 2020-08-24 RX ADMIN — DICYCLOMINE HYDROCHLORIDE 10 MG: 10 CAPSULE ORAL at 05:08

## 2020-08-24 RX ADMIN — TRAMADOL HYDROCHLORIDE 50 MG: 50 TABLET, FILM COATED ORAL at 12:08

## 2020-08-24 RX ADMIN — ALUMINUM HYDROXIDE, MAGNESIUM HYDROXIDE, AND SIMETHICONE 30 ML: 200; 200; 20 SUSPENSION ORAL at 05:08

## 2020-08-24 RX ADMIN — SODIUM CHLORIDE, SODIUM LACTATE, POTASSIUM CHLORIDE, AND CALCIUM CHLORIDE: .6; .31; .03; .02 INJECTION, SOLUTION INTRAVENOUS at 10:08

## 2020-08-24 RX ADMIN — ACETAMINOPHEN 650 MG: 325 TABLET, FILM COATED ORAL at 08:08

## 2020-08-24 NOTE — DISCHARGE INSTRUCTIONS
Diet for Chronic Kidney Disease  Following a special diet when you have kidney disease can help you stay as healthy as possible. Your healthcare provider or dietitian should make a special diet plan just for you.    Eating right  Here are some good eating rules to follow:  · Protein. Eating protein is important for your body. But too much protein can put a strain on your kidneys. Eating less protein may slow the progression of chronic kidney disease. Foods high in protein include meat, fish, eggs, cheese, and other dairy products. A registered dietitian can help you plan a diet that has the right amount of protein for you.  · Sodium. Having too much salt in your diet can make your body hold onto (retain) water. Ask your provider or dietitian how much sodium per day you are allowed. This will help you avoid fluid buildup in your body (fluid retention). It can also help control high blood pressure. Learn to read food labels to know how much sodium is in one serving. Foods high in salt include processed meats, canned and boxed foods, sauces, salted chips and snacks, pickled foods, frozen dinners, and restaurant and fast food.  · Fluids. If you have advanced kidney disease, you will need to limit the water and fluids you drink. If you dont, then too much water will build up in your body. The exact amount of fluid you can drink depends on how well your kidneys are working. Ask your provider how much water you can safely drink each day.  · Potassium. In advanced kidney disease, your potassium level can go dangerously high. This affects your heart. It can cause an irregular heartbeat (arrhythmia). Ask your provider or dietitian if you should limit potassium in your diet. Foods high in potassium include dairy products (milk, yogurt, cheese), dried beans, bananas, oranges, potatoes, tomatoes, spinach, cantaloupe, honeydew melon, dried fruits, and nuts.   · Calcium. Calcium is important to build strong bones. But foods  high in calcium are also high in phosphorus, which can take calcium from your bones. Limiting foods high in phosphorus will help keep calcium in your bones. Ask your provider how much calcium you should get each day.  · Phosphorus. In advanced kidney disease, your phosphorus level can go dangerously high. This affects many systems in the body and can damage your heart. Limit your intake of phosphorus-rich foods. These include dried beans and peas, nuts, peanut butter, cocoa, beer, cola drinks, and dairy products.  Date Last Reviewed: 8/1/2016  © 3688-4770 [employer]. [address] [place] [id number]. All rights reserved. This information is not intended as a substitute for professional medical care. Always follow your healthcare professional's instructions.      AVOID ALL NSAID's; ask your pharmacist prior to purchasing any over the counter medications.                   Thank you for choosing [facility] as your Health Care Provider. [facility] strives to provide the best healthcare available to you. In the next few days you may receive a Survey, either by mail or email,  asking you to rate our care that was provided to you during your stay.  Please return the survey to us, as your feedback is important. We aim to meet your expectations of safe, quality health care.    From your [facility] Care Team.

## 2020-08-24 NOTE — PLAN OF CARE
Pt alert throughout evening, sleeping intermittently.  Pt active, walking around room, cleaning.      In early PM, pt declining pain medication and bentyl, in attempts to go without medication fearing medication are worsening his renal function.  Instructed pt to call before pain is too severe.  At 0000, pt uncomfortable, c/o epigastric pain in addition to abdominal pain.  Reginald notified, one time dose of GI cocktail ordered.  Pt also requested dose of tramadol and tylenol.  On reassessment, pt sleeping, reports improvement in pain.    LR infusing per MAR.    Pt's BP 160s after pt had been walking around room.  Pt's BP also 160s at rest.

## 2020-08-24 NOTE — PROGRESS NOTES
Discharge instructions reviewed with patient. Verbalized understanding and denies any further questions. Patient denies any acute pain. IV removed fully intact. Pt does not wish to wait for transport to discharge; escorted to elevators by RN. Pt wishes to walk to car. Pt to drive self home. Provided with mask upon. All belongings taken with patient. Denies any further questions at this time.

## 2020-08-24 NOTE — PROGRESS NOTES
Nephrology    Consult Requested By: Zaire Lizarraga MD  Reason for Consult: FRENCH    SUBJECTIVE:     History of Present Illness:  Patient is a 39 y.o. male presents with Abdominal pain. He suffers from chronic back pain and on 8/17 had a Toradol injection and HPV vaccination. The afternoon of the following day he began to have cramping abdominal pain and nausea. His last Bm was either 8/18 or 8/19. The pain has persistent since then leading him to [facility]. During this time he has hardly eaten or drank anything except for a few noodles on Friday. He is also caring for his wife who is receiving Chemo and has trach currently so he states that he even prior to this episode starting was not eating and drinking as usual. He has also not been sleeping. He denies any LUTS, myalgias, F/C/SOB, rash, edema. He underwent CT abd/pelvis without contrast and there was no evidence of obstruction but a noted stone in upper pole left kidney. UA notable for mild blood without christiano hematuria. He denies hsitory of NSAID use. Baseline Cr 1.1    Interval History:    Feels better this am.  Mild abd pain.  Voiding well and asking to go home.  Creat essentially the same.  Discussed with treatment team in detail.  No CP/SOB.      History reviewed. No pertinent past medical history.  Past Surgical History:   Procedure Laterality Date    ANTERIOR CRUCIATE LIGAMENT REPAIR Right     KNEE ARTHROSCOPY W/ ACL RECONSTRUCTION      right    WISDOM TOOTH EXTRACTION       Family History   Problem Relation Age of Onset    Cancer Father         throat-- smoker    Hypertension Mother     Diabetes Mother     Hypertension Brother     Lupus Sister     No Known Problems Daughter     No Known Problems Son     COPD Maternal Grandmother     Eczema Daughter     Asthma Son     No Known Problems Son     Prostate cancer Neg Hx      Social History     Tobacco Use    Smoking status: Current Every Day Smoker     Types: Vaping w/o nicotine    Tobacco  comment: No longer vapes   Substance Use Topics    Alcohol use: Not Currently     Alcohol/week: 0.0 - 1.7 standard drinks     Frequency: Never     Drinks per session: Patient refused     Binge frequency: Never     Comment: None x 3 years     Drug use: Yes     Types: Marijuana     Comment: No longer smokes        Medications Prior to Admission   Medication Sig Dispense Refill Last Dose    atorvastatin (LIPITOR) 40 MG tablet Take 1 tablet (40 mg total) by mouth once daily. 90 tablet 3     buPROPion (WELLBUTRIN) 75 MG tablet Take 1 tablet (75 mg total) by mouth 2 (two) times daily. 60 tablet 11     ibuprofen (ADVIL,MOTRIN) 800 MG tablet Take 1 tablet (800 mg total) by mouth 3 (three) times daily. 60 tablet 5     traMADoL (ULTRAM) 50 mg tablet Take 1 tablet (50 mg total) by mouth every 6 (six) hours. 45 tablet 2     zolpidem (AMBIEN) 10 mg Tab Take 1 tablet (10 mg total) by mouth nightly as needed. 30 tablet 5        Review of patient's allergies indicates:   Allergen Reactions    Hydrocodone Itching    Iodine      Other reaction(s): Seafood - swelling, Itch        Review of Systems:  Constitutional: No fever or chills, no weight changes.  Eyes: No visual changes or photophobia  HEENT: No nasal congestion or sore throat  Respiratory: No cough or shorness of breath  Cardiovascular: No chest pain or palpitations  Gastrointestinal: Good appetite, no nausea or vomiting, no change in bowel habits  Genitourinary: No hematuria or dysuria  Skin: No rash or pruritis  Hematologic/lymphatic: No easy bruising, bleeding or lymphadenopathy  Musculoskeletal: No arthralgias or myalgias  Neurological: No seizures or tremors  Endocrine: No heat/cold intolerance.  No polyuria/polydipsia.  Psychiatric:  No depression or anxiety.     OBJECTIVE:     Vital Signs (Most Recent)  Temp: 98.2 °F (36.8 °C) (08/24/20 0820)  Pulse: (!) 54 (08/24/20 0820)  Resp: 16 (08/24/20 0820)  BP: (!) 160/93 (08/24/20 0820)  SpO2: 95 % (08/24/20  0820)    Vital Signs Range (Last 24H):  Temp:  [97.5 °F (36.4 °C)-98.2 °F (36.8 °C)]   Pulse:  [54-68]   Resp:  [16-18]   BP: (145-166)/(80-93)   SpO2:  [95 %-97 %]     Physical Exam:    General appearance: Well developed, well nourished  Head: Normocephalic, atraumatic  Eyes:  Conjunctivae nl. Sclera anicteric. PERRL.  HEENT: Lips, mucosa, and tongue normal; teeth and gums normal and oropharynx clear.  Neck: Supple, trachea midline, thyroid not enlarged,   Lungs: Clear to auscultation bilaterally and normal respiratory effort  Heart: Regular rate and rhythm, S1, S2 normal, no murmur, click, rub or gallop  Abdomen: Soft, non-tender non-distended; bowel sounds normal; no masses,  no organomegaly  Extremities: No cyanosis or clubbing. No edema  Pulses: 2+ and symmetric  Skin: Skin color, texture, turgor normal. No rashes or lesions  Lymph nodes: Cervical, supraclavicular, and axillary nodes normal.  Neurologic: Normal strength and tone. No focal numbness or weakness  Psychiatric:  Alert and oriented times 3.  Affect appropriate.     Diagnostic Results:  Labs: Reviewed  CT: Reviewed     Recent Labs   Lab 08/24/20  0405      K 4.0      CO2 27   BUN 17   CREATININE 2.6*   CALCIUM 8.8       ASSESSMENT/PLAN:     Nonoliguric FRENCH  -Baseline Cr 1.1  -I suspect that he had ongoing volume depletion due to his poor intake and perhaps Toradol/NSAID effect.  -There is a remote chance that he had perhaps transient obstruction and passage of stone given residual stone disease and dipstick hematuria.  -Initial FeNa was low which can be pre-renal disease vs early other intrinsic disease. Urine Na from yesterday is elevated and may reflect IVFs/ improving volume status.  -Awaiting urine eos, but no peripheral eosinophilia.  -Uric acid is slightly elevated as well which can also go along with pre-renal disease.  -CPK is normal and UA is otherwise unremarkable.  -At this point my best guess is ATN as cause of FRENCH    Plan:  OK  for discharge home  NO MORE NSAIDS.  WEEKLY BMP AND SENT TO US  IF TRENDS WORSEN WILL BIOPSY.

## 2020-08-25 NOTE — ED PROVIDER NOTES
"Encounter Date: 8/24/2020       History     Chief Complaint   Patient presents with    Abdominal Pain     and pressure that started an hour renate similar to what he had a couple days ago     The patient is a 39-year-old male with no pertinent past medical history who presents to the ED complaining of a "fluttering" feeling in his upper abdomen and left anterior chest that has been intermittent since this morning.  Patient was just discharged from the hospital this morning after being admitted for acute kidney injury.  Patient reported these symptoms prior to discharge and he was treated successfully with a GI cocktail.  He presented initially to the ED on 08/21/2020 with severe abdominal cramping.  Patient states that his symptoms today are different. He denies fever, chills, cough, shortness of breath, nausea, vomiting, diarrhea, and urinary complaints.  No dark or bloody stools.  He has no known history of cardiac disease, diabetes, hypertension, or hyperlipidemia.  Patient did note that his blood pressure was elevated at home (190s/110s) and he was instructed by the triage nurse on-call to present back to the emergency department for further evaluation.  No treatment attempted prior to arrival.    The history is provided by the patient.     Review of patient's allergies indicates:   Allergen Reactions    Hydrocodone Itching    Iodine      Other reaction(s): Seafood - swelling, Itch     History reviewed. No pertinent past medical history.  Past Surgical History:   Procedure Laterality Date    ANTERIOR CRUCIATE LIGAMENT REPAIR Right     KNEE ARTHROSCOPY W/ ACL RECONSTRUCTION      right    WISDOM TOOTH EXTRACTION       Family History   Problem Relation Age of Onset    Cancer Father         throat-- smoker    Hypertension Mother     Diabetes Mother     Hypertension Brother     Lupus Sister     No Known Problems Daughter     No Known Problems Son     COPD Maternal Grandmother     Eczema Daughter     " Asthma Son     No Known Problems Son     Prostate cancer Neg Hx      Social History     Tobacco Use    Smoking status: Former Smoker     Types: Vaping w/o nicotine    Tobacco comment: No longer vapes   Substance Use Topics    Alcohol use: Not Currently     Alcohol/week: 0.0 - 1.7 standard drinks     Frequency: Never     Drinks per session: Patient refused     Binge frequency: Never     Comment: None x 3 years     Drug use: Yes     Types: Marijuana     Comment: No longer smokes      Review of Systems   Constitutional: Negative for fever.   HENT: Negative for sore throat.    Respiratory: Negative for shortness of breath.    Cardiovascular: Negative for chest pain.        Fluttering   Gastrointestinal: Negative for nausea.   Genitourinary: Negative for dysuria.   Musculoskeletal: Negative for back pain.   Skin: Negative for rash.   Neurological: Negative for weakness.   Hematological: Does not bruise/bleed easily.       Physical Exam     Initial Vitals   BP Pulse Resp Temp SpO2   08/24/20 2111 08/24/20 2111 08/24/20 2111 08/24/20 2109 08/24/20 2111   (!) 182/91 60 18 98.9 °F (37.2 °C) 99 %      MAP       --                Physical Exam    Nursing note and vitals reviewed.  Constitutional: He appears well-developed and well-nourished.  Non-toxic appearance. No distress.   The patient is alert, oriented, and nontoxic appearing.  No apparent distress.   HENT:   Head: Normocephalic and atraumatic.   Right Ear: Hearing and external ear normal.   Left Ear: Hearing and external ear normal.   Nose: Nose abnormal. No mucosal edema or rhinorrhea. Right sinus exhibits no maxillary sinus tenderness and no frontal sinus tenderness. Left sinus exhibits no maxillary sinus tenderness and no frontal sinus tenderness.   Mouth/Throat: Uvula is midline, oropharynx is clear and moist and mucous membranes are normal. No trismus in the jaw. No uvula swelling. No oropharyngeal exudate, posterior oropharyngeal edema, posterior  oropharyngeal erythema or tonsillar abscesses.   Eyes: Conjunctivae and EOM are normal. Pupils are equal, round, and reactive to light.   Neck: Full passive range of motion without pain. Neck supple.   Cardiovascular: Normal rate, normal heart sounds and normal pulses. Exam reveals no gallop and no friction rub.    No murmur heard.  Pulses:       Radial pulses are 2+ on the right side and 2+ on the left side.        Dorsalis pedis pulses are 2+ on the right side and 2+ on the left side.   No lower extremity edema   Pulmonary/Chest: Effort normal and breath sounds normal. No respiratory distress. He has no decreased breath sounds. He has no wheezes. He has no rhonchi. He has no rales.   Abdominal: Soft. Bowel sounds are normal. There is no abdominal tenderness. There is no rigidity, no rebound and no guarding.   No significant abdominal tenderness to palpation   Musculoskeletal: Normal range of motion.   Neurological: He is alert and oriented to person, place, and time. He has normal strength. Gait normal. GCS eye subscore is 4. GCS verbal subscore is 5. GCS motor subscore is 6.   Skin: Skin is warm, dry and intact. Capillary refill takes less than 2 seconds. No rash noted.   Psychiatric: He has a normal mood and affect. His speech is normal and behavior is normal. Judgment and thought content normal. Cognition and memory are normal.         ED Course   Procedures  Labs Reviewed   CBC W/ AUTO DIFFERENTIAL - Abnormal; Notable for the following components:       Result Value    RBC 4.38 (*)     Hemoglobin 13.1 (*)     Hematocrit 38.0 (*)     RDW 10.9 (*)     MPV 8.9 (*)     All other components within normal limits   COMPREHENSIVE METABOLIC PANEL - Abnormal; Notable for the following components:    Glucose 121 (*)     Creatinine 2.7 (*)     Albumin 3.2 (*)     Alkaline Phosphatase 54 (*)     eGFR if  33 (*)     eGFR if non  28 (*)     All other components within normal limits    URINALYSIS, REFLEX TO URINE CULTURE - Abnormal; Notable for the following components:    Occult Blood UA Trace (*)     All other components within normal limits    Narrative:     Specimen Source->Urine   LIPASE   CK   TROPONIN I   TROPONIN I          Imaging Results    None          Medical Decision Making:   History:   Old Medical Records: I decided to obtain old medical records.  Clinical Tests:   Lab Tests: Ordered and Reviewed  ED Management:  This is an emergent evaluation of a 39-year-old male who presents to the ED for further evaluation of abdominal fluttering in his upper abdomen and left anterior chest that has been intermittent since this morning.    Physical exam is unremarkable.    ECG demonstrates sinus bradycardia.  No STEMI.    I independently reviewed and interpreted labs which are notable for creatinine of 2.7.  This is stable when compared to his creatinine of 2.6 this morning.  Baseline creatinine is 1.1.  He was found to have a creatinine of 2.2 at the time of initial ED presentation on 08/21.  He was seen by Nephrology prior to discharge this morning and, according to clinical notes, it is possible that factors contributing to his acute kidney injury included recent volume depletion, recent Toradol injection, and use of Mag citrate for constipation.  Negative troponin today.    Chest x-ray is clear.    Case was discussed with nephrologist on-call.  With the blood pressure spike at home, he believes that it is reasonable to initiate amlodipine prior to discharge.  Otherwise, this does not change the current plan in place which is for the patient to monitor BMPs weekly.  Plan is for renal biopsy if his creatinine continues to trend upward.    Unclear etiology of abdominal/chest fluttering.  I doubt ACS/MI, atrial fibrillation, pneumonia, pneumothorax, pulmonary embolus, or other emergent cause of the symptoms.  I do believe that the patient is stable for discharge.  I have advised close  outpatient follow-up with his primary care provider.  Emphasized importance of following up with Nephrology as well.  All questions answered.  Strict return precautions have been discussed.  Case discussed with supervising physician who agrees with the plan.                                 Clinical Impression:       ICD-10-CM ICD-9-CM   1. Elevated blood pressure reading  R03.0 796.2   2. Upper abdominal pain  R10.10 789.09   3. Chest discomfort  R07.89 786.59                                Pretty Davis NP  08/24/20 [id number]

## 2020-08-25 NOTE — ED TRIAGE NOTES
"Pt to ed c/o abd discomfort and having a "feeling". He states that he doesn't feel nauseated but he states his stomach doesn't feel right. He denies any current n/v/d, fever or chills. Pt AAox4, resp pattern even and non labored.  "

## 2020-08-25 NOTE — HOSPITAL COURSE
39 year old male admitted to observation with abdominal pain, non-oliguric FRENCH, possible ATN cause, no improvement with IVF's. CT A/P showed No acute findings on CT abdomen and pelvis without contrast. Punctate nonobstructing left renal calculus. Non-acute abdominal exam, urine tox neg; uses CBD gummies. Tolerating po. + BM's. Nephrology consulted. Suspect that he had ongoing volume depletion due to his poor intake and perhaps Toradol/NSAID effect. There is a remote chance that he had perhaps transient obstruction and passage of stone given residual stone disease and dipstick hematuria. Initial FeNa was low which can be pre-renal disease vs early other intrinsic disease. Urine Na repeat was elevated and may reflect IVFs/ improving volume status. CPK is normal and UA is otherwise unremarkable. Patient eager to home, plan for weekly BMP's, instructed no NSAIDs. Nephrology will follow, if no improvement will arrange for outpatient biopsy.

## 2020-08-25 NOTE — DISCHARGE INSTRUCTIONS
Thank you for allowing me to care for you today.  I hope our treatment plan will make you feel better in the next few days.  In order for me to take better care of my future patients and improve our Emergency Department, I would appreciate if you can provide us with feedback.  In the next few days, you may receive a survey in the mail.  If you do, it would mean a great deal to me if you would please take the time to complete it.    Thank you and I hope you feel better.  Pretty Davis NP

## 2020-08-25 NOTE — DISCHARGE SUMMARY
[facility] Hospital Medicine  Discharge Summary      Patient Name: Slime Bradshaw  MRN: [id number]  Admission Date: 8/21/2020  Hospital Length of Stay: 0 days  Discharge Date and Time: 8/24/2020 12:30 PM  Attending Physician: No att. providers found   Discharging Provider: Shonna Villarreal PA-C  Primary Care Provider: Lindsey Lozoya MD      HPI:   Mr. Slime Bradshaw is a 39 y.o. male, with PMH of mood disorder, HLD, chronic back pain, and anxiety, who [place] [facility] [place]20 with 2 days of abdominal cramping and shooting tightness. He states the pain is worse after eating, and when he moves, and is located inferior to the belly button.  He states he had the HPV vaccine and a Toradol injection the morning the pain started. Later that day, he was constipated Wednesday, and then had loose stools after taking mag citrate. He denies blood in the stool, nausea, vomiting, chest pain, SOB, fever, chills.He denies h/o abdominal surgeries. He was evaluated it he ED with labs which showed decline in renal function with BUN 16 and Cr of 2.1 without hyperkalemia. A CT of the abdomen and pelvis showed no acute findings. After 4 mg morphine, two 1 mg doses dilaudid and a 100 mcg dose of fentanyl he was still unable to tolerate the pain and was placed on OBS.     * No surgery found *      Hospital Course:   39 year old male admitted to observation with abdominal pain, non-oliguric FRENCH, possible ATN cause, no improvement with IVF's. CT A/P showed No acute findings on CT abdomen and pelvis without contrast. Punctate nonobstructing left renal calculus. Non-acute abdominal exam, urine tox neg; uses CBD gummies. Tolerating po. + BM's. Nephrology consulted. Suspect that he had ongoing volume depletion due to his poor intake and perhaps Toradol/NSAID effect. There is a remote chance that he had perhaps transient obstruction and passage of stone given residual stone disease and dipstick hematuria.  Initial FeNa was low which can be pre-renal disease vs early other intrinsic disease. Urine Na repeat was elevated and may reflect IVFs/ improving volume status. CPK is normal and UA is otherwise unremarkable. Patient eager to home, plan for weekly BMP's, instructed no NSAIDs. Nephrology will follow, if no improvement will arrange for outpatient biopsy.      Consults:   Consults (From admission, onward)        Status Ordering Provider     Inpatient consult to [facility]  Once     Provider:  Mary Israel MD    Completed MARY CARMEN GUERRERO          No new Assessment & Plan notes have been filed under this hospital service since the last note was generated.  Service: Hospital Medicine    Final Active Diagnoses:    Diagnosis Date Noted POA    PRINCIPAL PROBLEM:  FRENCH (acute kidney injury) [N17.9] 08/22/2020 Yes    Lower abdominal pain [R10.30] 08/22/2020 Yes    Essential hypertension [I10] 08/08/2018 Yes      Problems Resolved During this Admission:       Discharged Condition: stable    Disposition: Home or Self Care    Follow Up:  Follow-up Information     Lindsey Lozoya MD.    Specialty: Family Medicine  Contact information:  [address] [facility]  [place] [address]  [phone]                 Patient Instructions:      BASIC METABOLIC PANEL   Standing Status: Standing Number of Occurrences: 4 Standing Exp. Date: 10/23/21     Diet renal     Notify your health care provider if you experience any of the following:  temperature >100.4     Notify your health care provider if you experience any of the following:  persistent nausea and vomiting or diarrhea     Notify your health care provider if you experience any of the following:  severe uncontrolled pain     Notify your health care provider if you experience any of the following:  difficulty breathing or increased cough     Notify your health care provider if you experience any of the following:  severe persistent headache     Notify your health  care provider if you experience any of the following:  persistent dizziness, light-headedness, or visual disturbances     Notify your health care provider if you experience any of the following:  increased confusion or weakness     Activity as tolerated       Significant Diagnostic Studies: Labs:   CMP   Recent Labs   Lab 08/23/20  0616 08/24/20  0405    142   K 3.8 4.0    106   CO2 24 27   GLU 99 95   BUN 19 17   CREATININE 2.5* 2.6*   CALCIUM 8.8 8.8   ANIONGAP 9 9   ESTGFRAFRICA 36* 34*   EGFRNONAA 31* 30*   , CBC   Recent Labs   Lab 08/23/20  0616 08/24/20  0405   WBC 4.67 4.33   HGB 13.2* 12.7*   HCT 38.8* 37.5*    254    and All labs within the past 24 hours have been reviewed  Radiology:   Imaging Results          CT Abdomen Pelvis  Without Contrast (Final result)  Result time 08/21/20 23:19:23    Final result by Priyanka Smith MD (08/21/20 23:19:23)                 Impression:      No acute findings on CT abdomen and pelvis without contrast.    Punctate nonobstructing left renal calculus.      Electronically signed by: Priyanka Smith  Date:    08/21/2020  Time:    23:19             Narrative:    EXAMINATION:  CT ABDOMEN PELVIS WITHOUT    CLINICAL HISTORY:  Abdominal cramping.    TECHNIQUE:  5 mm unenhanced axial images from the lung bases through the greater trochanters were performed.  Coronal and sagittal reformatted images were provided.    COMPARISON:  None.    FINDINGS:  Within the limits of a noncontrast examination, the liver, spleen, pancreas, and adrenal glands are unremarkable.  The gallbladder contains no calcified gallstones.    There is a punctate hyperdensity in the left mid to upper pole (series 2 axial image 62).    There is no gross abdominal adenopathy or ascites.    There are no pelvic masses or adenopathy.  There is no free pelvic fluid.  The appendix is not inflamed.    At the lung bases, there is mild bibasilar atelectatic change.                                   Pending Diagnostic Studies:     None         Medications:  Reconciled Home Medications:      Medication List      CONTINUE taking these medications    atorvastatin 40 MG tablet  Commonly known as: LIPITOR  Take 1 tablet (40 mg total) by mouth once daily.     buPROPion 75 MG tablet  Commonly known as: WELLBUTRIN  Take 1 tablet (75 mg total) by mouth 2 (two) times daily.        STOP taking these medications    ibuprofen 800 MG tablet  Commonly known as: ADVIL,MOTRIN     traMADoL 50 mg tablet  Commonly known as: ULTRAM     zolpidem 10 mg Tab  Commonly known as: AMBIEN            Indwelling Lines/Drains at time of discharge:   Lines/Drains/Airways     None                 Time spent on the discharge of patient: >30 minutes  Patient was seen and examined on the date of discharge and determined to be suitable for discharge.         Shonna Villarreal PA-C  Department of Hospital Medicine  [facility]

## 2020-08-26 ENCOUNTER — HOSPITAL ENCOUNTER (EMERGENCY)
Facility: HOSPITAL | Age: 40
Discharge: HOME | End: 2020-08-26
Attending: EMERGENCY MEDICINE
Payer: MEDICAID

## 2020-08-26 ENCOUNTER — TELEPHONE (OUTPATIENT)
Dept: FAMILY MEDICINE | Facility: CLINIC | Age: 40
End: 2020-08-26

## 2020-08-26 VITALS
OXYGEN SATURATION: 95 % | TEMPERATURE: 98 F | BODY MASS INDEX: 29.13 KG/M2 | DIASTOLIC BLOOD PRESSURE: 90 MMHG | HEART RATE: 60 BPM | HEIGHT: 74 IN | SYSTOLIC BLOOD PRESSURE: 142 MMHG | RESPIRATION RATE: 15 BRPM | WEIGHT: 227 LBS

## 2020-08-26 DIAGNOSIS — I10 HYPERTENSION, UNSPECIFIED TYPE: Primary | ICD-10-CM

## 2020-08-26 DIAGNOSIS — N17.9 AKI (ACUTE KIDNEY INJURY): ICD-10-CM

## 2020-08-26 DIAGNOSIS — R51.9 NONINTRACTABLE HEADACHE, UNSPECIFIED CHRONICITY PATTERN, UNSPECIFIED HEADACHE TYPE: ICD-10-CM

## 2020-08-26 LAB
BUN SERPL-MCNC: 17 MG/DL (ref 6–30)
CHLORIDE SERPL-SCNC: 103 MMOL/L (ref 95–110)
CREAT SERPL-MCNC: 2 MG/DL (ref 0.5–1.4)
GLUCOSE SERPL-MCNC: 110 MG/DL (ref 70–110)
HCT VFR BLD CALC: 41 %PCV (ref 36–54)
POC IONIZED CALCIUM: 1.16 MMOL/L (ref 1.06–1.42)
POC TCO2 (MEASURED): 26 MMOL/L (ref 23–29)
POTASSIUM BLD-SCNC: 3.7 MMOL/L (ref 3.5–5.1)
SAMPLE: ABNORMAL
SODIUM BLD-SCNC: 141 MMOL/L (ref 136–145)

## 2020-08-26 PROCEDURE — 99283 EMERGENCY DEPT VISIT LOW MDM: CPT | Mod: 25

## 2020-08-26 PROCEDURE — 99284 EMERGENCY DEPT VISIT MOD MDM: CPT | Mod: ,,, | Performed by: NURSE PRACTITIONER

## 2020-08-26 PROCEDURE — 99284 PR EMERGENCY DEPT VISIT,LEVEL IV: ICD-10-PCS | Mod: ,,, | Performed by: NURSE PRACTITIONER

## 2020-08-26 PROCEDURE — 80047 BASIC METABLC PNL IONIZED CA: CPT

## 2020-08-26 NOTE — ED TRIAGE NOTES
Patient states blood pressure 194/108 this am, no meds taken today. States he was in ED Friday for same, Reports headache relieved with Tylenol, concerned about kidney function.

## 2020-08-26 NOTE — ED PROVIDER NOTES
Encounter Date: 8/26/2020       History     Chief Complaint   Patient presents with    Hypertension     started on bp meds 2d ago, this morning bp 194-108 with headache     HPI   This is a 40-year-old male with past medical history significant for hypertension and FRENCH who presents to the emergency department today for evaluation of elevated blood pressure and headache.  Patient states that he was admitted to the hospital this past Friday and then discharged on Monday for abdominal pain.  He states that the abdominal pain has subsided.  He states that during that hospitalization, he was diagnosed with hypertension and started on amlodipine 5 mg which he is still taking as prescribed.  Patient states that last night he developed a throbbing headache the back of his head which was gradual in onset and alleviated by Tylenol.  He currently does not have any pain.  He denies dizziness, weakness, numbness, speech change, vision change or syncope.  Patient states that he is concerned because his creatinine was elevated during his hospital stay and he wants to make sure that has not gotten any higher.  Patient denies chest pain or shortness of breath.  Denies fever or chills.  Denies any other problems or concerns.    Review of patient's allergies indicates:   Allergen Reactions    Hydrocodone Itching    Iodine      Other reaction(s): Seafood - swelling, Itch     History reviewed. No pertinent past medical history.  Past Surgical History:   Procedure Laterality Date    ANTERIOR CRUCIATE LIGAMENT REPAIR Right     KNEE ARTHROSCOPY W/ ACL RECONSTRUCTION      right    WISDOM TOOTH EXTRACTION       Family History   Problem Relation Age of Onset    Cancer Father         throat-- smoker    Hypertension Mother     Diabetes Mother     Hypertension Brother     Lupus Sister     No Known Problems Daughter     No Known Problems Son     COPD Maternal Grandmother     Eczema Daughter     Asthma Son     No Known Problems Son      Prostate cancer Neg Hx      Social History     Tobacco Use    Smoking status: Former Smoker     Types: Vaping w/o nicotine    Tobacco comment: No longer vapes   Substance Use Topics    Alcohol use: Not Currently     Alcohol/week: 0.0 - 1.7 standard drinks     Frequency: Never     Drinks per session: Patient refused     Binge frequency: Never     Comment: None x 3 years     Drug use: Yes     Types: Marijuana     Comment: No longer smokes      Review of Systems   Constitutional: Negative for chills and fever.   HENT: Negative for congestion and sinus pressure.    Eyes: Negative for visual disturbance.   Respiratory: Negative for cough, chest tightness and shortness of breath.    Cardiovascular: Negative for chest pain.   Gastrointestinal: Negative for abdominal pain, diarrhea, nausea and vomiting.   Genitourinary: Negative for flank pain. Negative for dysuria.  Musculoskeletal: Negative for arthralgias and myalgias.   Skin: Negative for rash and wound.   Neurological: Negative for dizziness. Negative for weakness and numbness.  positive for headache.  Psychiatric/Behavioral: Negative for confusion.         Physical Exam     Initial Vitals [08/26/20 1115]   BP Pulse Resp Temp SpO2   (!) 148/96 67 16 98 °F (36.7 °C) 96 %      MAP       --         Physical Exam   Nursing note and vitals reviewed.  Constitutional: Patient appears well-developed and well-nourished. Not diaphoretic. No distress.   HENT:   Head: Normocephalic and atraumatic.   Eyes: Conjunctivae are normal. No scleral icterus.   Neck: Normal range of motion. Neck supple.   Cardiovascular: Normal rate, regular rhythm and normal heart sounds.   Pulmonary/Chest: Breath sounds normal. No respiratory distress.  Abdominal: Soft. There is no tenderness.   Musculoskeletal: Normal range of motion. No edema or tenderness.   Neurological: Alert and oriented to person, place, and time. Normal strength. No sensory deficit.   Skin: Skin is warm and dry. No rash  noted. No erythema. No pallor.   Psychiatric: Normal mood and affect. Thought content normal.       ED Course   Procedures  Labs Reviewed   ISTAT PROCEDURE - Abnormal; Notable for the following components:       Result Value    POC Creatinine 2.0 (*)     All other components within normal limits          Imaging Results    None          Medical Decision Making:   ED Management:  40-year-old male with history of hypertension and recent FRENCH presenting for evaluation of a headache that occurred last night and has now resolved.  He is afebrile, nontoxic in no distress.  He is neurologically intact.  He expresses concerns over his creatinine level as it was recently elevated during his hospitalization over the weekend.  He reports compliance with amlodipine 5 mg which he just began on Friday.  Plan to obtain i-STAT to monitor creatinine.  He will be encouraged to follow up outpatient and continue his medication as prescribed.    Creatinine today is 2.0 compared to 2.7 two days ago on 8/24/2020. He remains asymptomatic in the ED. Will discharge home with PCP follow up. Return precautions discussed.              Attending Attestation:     Physician Attestation Statement for NP/PA:   I discussed this assessment and plan of this patient with the NP/PA, but I did not personally examine the patient. The face to face encounter was performed by the NP/PA.                                Clinical Impression:       ICD-10-CM ICD-9-CM   1. Hypertension, unspecified type  I10 401.9   2. Nonintractable headache, unspecified chronicity pattern, unspecified headache type  R51 784.0   3. FRENCH (acute kidney injury)  N17.9 584.9             ED Disposition Condition    Discharge Stable        ED Prescriptions     None        Follow-up Information     Follow up With Specialties Details Why Contact Info    Lindsey Lozoya MD Family Medicine Schedule an appointment as soon as possible for a visit  Follow-up with a primary care provider within  1 week.  Return to the emergency department for any changes, worsening or concerns.  Take your medication as prescribed. [id number] [id number]  [phone]                                       Beatrice Gomez NP  08/26/20 1762

## 2020-08-26 NOTE — ED NOTES
Patient identifiers verified and correct for Mr Bradshaw  C/C: Elevated blood pressure and headache SEE NN  APPEARANCE: awake and alert in NAD.  SKIN: warm, dry and intact. No breakdown or bruising.  MUSCULOSKELETAL: Patient moving all extremities spontaneously, no obvious swelling or deformities noted. Ambulates independently.  RESPIRATORY: Denies shortness of breath.Respirations unlabored.   CARDIAC: Denies CP, 2+ distal pulses; no peripheral edema  ABDOMEN: S/ND/NT, Denies nausea  : voids spontaneously, denies difficulty  Neurologic: AAO x 4; follows commands equal strength in all extremities; denies numbness/tingling. Denies dizziness Denies weakness, denies headache upon admit to ED.

## 2020-08-26 NOTE — TELEPHONE ENCOUNTER
Return call to Pt, and he states that he was admitted in the ED, and needs to schedule a hospital f/u within a week. No appointmnent open until after 10-, please advise.

## 2020-09-02 ENCOUNTER — LAB VISIT (OUTPATIENT)
Dept: LAB | Facility: HOSPITAL | Age: 40
End: 2020-09-02
Attending: FAMILY MEDICINE
Payer: MEDICAID

## 2020-09-02 ENCOUNTER — OFFICE VISIT (OUTPATIENT)
Dept: FAMILY MEDICINE | Facility: CLINIC | Age: 40
End: 2020-09-02
Payer: MEDICAID

## 2020-09-02 VITALS
OXYGEN SATURATION: 97 % | SYSTOLIC BLOOD PRESSURE: 130 MMHG | BODY MASS INDEX: 28.87 KG/M2 | TEMPERATURE: 98 F | WEIGHT: 224.88 LBS | DIASTOLIC BLOOD PRESSURE: 80 MMHG | HEART RATE: 86 BPM

## 2020-09-02 DIAGNOSIS — N17.9 ACUTE KIDNEY INJURY: ICD-10-CM

## 2020-09-02 DIAGNOSIS — I10 ESSENTIAL HYPERTENSION: ICD-10-CM

## 2020-09-02 DIAGNOSIS — Z09 HOSPITAL DISCHARGE FOLLOW-UP: Primary | ICD-10-CM

## 2020-09-02 DIAGNOSIS — Z11.59 NEED FOR HEPATITIS C SCREENING TEST: ICD-10-CM

## 2020-09-02 DIAGNOSIS — N17.9 AKI (ACUTE KIDNEY INJURY): ICD-10-CM

## 2020-09-02 LAB
ALBUMIN SERPL BCP-MCNC: 3.9 G/DL (ref 3.5–5.2)
ALP SERPL-CCNC: 70 U/L (ref 55–135)
ALT SERPL W/O P-5'-P-CCNC: 44 U/L (ref 10–44)
ANION GAP SERPL CALC-SCNC: 10 MMOL/L (ref 8–16)
AST SERPL-CCNC: 44 U/L (ref 10–40)
BASOPHILS # BLD AUTO: 0.01 K/UL (ref 0–0.2)
BASOPHILS NFR BLD: 0.2 % (ref 0–1.9)
BILIRUB SERPL-MCNC: 0.2 MG/DL (ref 0.1–1)
BUN SERPL-MCNC: 15 MG/DL (ref 6–20)
CALCIUM SERPL-MCNC: 9.7 MG/DL (ref 8.7–10.5)
CHLORIDE SERPL-SCNC: 104 MMOL/L (ref 95–110)
CO2 SERPL-SCNC: 27 MMOL/L (ref 23–29)
CREAT SERPL-MCNC: 1.4 MG/DL (ref 0.5–1.4)
DIFFERENTIAL METHOD: ABNORMAL
EOSINOPHIL # BLD AUTO: 0.2 K/UL (ref 0–0.5)
EOSINOPHIL NFR BLD: 3.2 % (ref 0–8)
ERYTHROCYTE [DISTWIDTH] IN BLOOD BY AUTOMATED COUNT: 10.9 % (ref 11.5–14.5)
EST. GFR  (AFRICAN AMERICAN): >60 ML/MIN/1.73 M^2
EST. GFR  (NON AFRICAN AMERICAN): >60 ML/MIN/1.73 M^2
GLUCOSE SERPL-MCNC: 109 MG/DL (ref 70–110)
HCT VFR BLD AUTO: 40.9 % (ref 40–54)
HGB BLD-MCNC: 14.4 G/DL (ref 14–18)
IMM GRANULOCYTES # BLD AUTO: 0.01 K/UL (ref 0–0.04)
IMM GRANULOCYTES NFR BLD AUTO: 0.2 % (ref 0–0.5)
LYMPHOCYTES # BLD AUTO: 2.1 K/UL (ref 1–4.8)
LYMPHOCYTES NFR BLD: 42.1 % (ref 18–48)
MCH RBC QN AUTO: 30.1 PG (ref 27–31)
MCHC RBC AUTO-ENTMCNC: 35.2 G/DL (ref 32–36)
MCV RBC AUTO: 85 FL (ref 82–98)
MONOCYTES # BLD AUTO: 0.4 K/UL (ref 0.3–1)
MONOCYTES NFR BLD: 7.1 % (ref 4–15)
NEUTROPHILS # BLD AUTO: 2.3 K/UL (ref 1.8–7.7)
NEUTROPHILS NFR BLD: 47.2 % (ref 38–73)
NRBC BLD-RTO: 0 /100 WBC
PLATELET # BLD AUTO: 262 K/UL (ref 150–350)
PMV BLD AUTO: 10.1 FL (ref 9.2–12.9)
POTASSIUM SERPL-SCNC: 4.1 MMOL/L (ref 3.5–5.1)
PROT SERPL-MCNC: 7.6 G/DL (ref 6–8.4)
RBC # BLD AUTO: 4.79 M/UL (ref 4.6–6.2)
SODIUM SERPL-SCNC: 141 MMOL/L (ref 136–145)
WBC # BLD AUTO: 4.96 K/UL (ref 3.9–12.7)

## 2020-09-02 PROCEDURE — 99214 PR OFFICE/OUTPT VISIT, EST, LEVL IV, 30-39 MIN: ICD-10-PCS | Mod: S$PBB,,, | Performed by: FAMILY MEDICINE

## 2020-09-02 PROCEDURE — 99214 OFFICE O/P EST MOD 30 MIN: CPT | Mod: S$PBB,,, | Performed by: FAMILY MEDICINE

## 2020-09-02 PROCEDURE — 80053 COMPREHEN METABOLIC PANEL: CPT

## 2020-09-02 PROCEDURE — 36415 COLL VENOUS BLD VENIPUNCTURE: CPT | Mod: PO

## 2020-09-02 PROCEDURE — 99999 PR PBB SHADOW E&M-EST. PATIENT-LVL III: CPT | Mod: PBBFAC,,, | Performed by: FAMILY MEDICINE

## 2020-09-02 PROCEDURE — 86803 HEPATITIS C AB TEST: CPT

## 2020-09-02 PROCEDURE — 99213 OFFICE O/P EST LOW 20 MIN: CPT | Mod: PBBFAC,PO | Performed by: FAMILY MEDICINE

## 2020-09-02 PROCEDURE — 85025 COMPLETE CBC W/AUTO DIFF WBC: CPT

## 2020-09-02 PROCEDURE — 99999 PR PBB SHADOW E&M-EST. PATIENT-LVL III: ICD-10-PCS | Mod: PBBFAC,,, | Performed by: FAMILY MEDICINE

## 2020-09-02 NOTE — PROGRESS NOTES
Subjective:       Patient ID: Slimegerard Bradshaw is a 40 y.o. male.    Chief Complaint: Hospital Follow Up    40 year old male presents for hospital follow up. He has having stomach and leg cramps. He had severe pains and had acute kidney injury. He went to the ED and was noted to be in kidney failure. He hasn't taken the Lipitor daily. H was taking in 3 times per week.  He will to the emergency room had a CT scan of the abdomen and pelvis done, which showed only a small kidney stone in his left kidney.  He had an ultrasound when he returns later, which was normal.  He also had a normal chest x-ray.  His kidney function is gradually improved, but is still decreased.  He would likely need a repeat renal function today.  He has stated hydrated.  He has since stopped his Lipitor.  He denies any body aches or muscle cramps.  He has no nausea, vomiting, hematuria.  He has also since stopped his blood pressure medication.    No past medical history on file.   Past Surgical History:  No date: ANTERIOR CRUCIATE LIGAMENT REPAIR; Right  No date: KNEE ARTHROSCOPY W/ ACL RECONSTRUCTION      Comment:  right  No date: WISDOM TOOTH EXTRACTION  Review of patient's family history indicates:  Problem: Cancer      Relation: Father          Age of Onset: (Not Specified)          Comment: throat-- smoker  Problem: Hypertension      Relation: Mother          Age of Onset: (Not Specified)  Problem: Diabetes      Relation: Mother          Age of Onset: (Not Specified)  Problem: Hypertension      Relation: Brother          Age of Onset: (Not Specified)  Problem: Lupus      Relation: Sister          Age of Onset: (Not Specified)  Problem: No Known Problems      Relation: Daughter          Age of Onset: (Not Specified)  Problem: No Known Problems      Relation: Son          Age of Onset: (Not Specified)  Problem: COPD      Relation: Maternal Grandmother          Age of Onset: (Not Specified)  Problem: Eczema      Relation: Daughter           Age of Onset: (Not Specified)  Problem: Asthma      Relation: Son          Age of Onset: (Not Specified)  Problem: No Known Problems      Relation: Son          Age of Onset: (Not Specified)  Problem: Prostate cancer      Relation: Neg Hx          Age of Onset: (Not Specified)    Social History    Socioeconomic History      Marital status:       Spouse name: Not on file      Number of children: Not on file      Years of education: Not on file      Highest education level: Not on file    Occupational History      Not on file    Social Needs      Financial resource strain: Not hard at all      Food insecurity        Worry: Never true        Inability: Never true      Transportation needs        Medical: No        Non-medical: No    Tobacco Use      Smoking status: Former Smoker        Types: Vaping w/o nicotine      Tobacco comment: No longer vapes    Substance and Sexual Activity      Alcohol use: Not Currently        Alcohol/week: 0.0 - 1.7 standard drinks        Frequency: Never        Drinks per session: Patient refused        Binge frequency: Never        Comment: None x 3 years       Drug use: Yes        Types: Marijuana        Comment: No longer smokes       Sexual activity: Yes        Partners: Female    Lifestyle      Physical activity        Days per week: 2 days        Minutes per session: 60 min      Stress: To some extent    Relationships      Social connections        Talks on phone: More than three times a week        Gets together: Once a week        Attends Hindu service: Not on file        Active member of club or organization: No        Attends meetings of clubs or organizations: Never        Relationship status:     Other Topics      Concerns:        Not on file    Social History Narrative      Works as a  for [employer]        Review of Systems      Objective:         Vitals:    09/02/20 1505   BP: 130/80   Pulse: 86   Temp: 98.2 °F (36.8 °C)   TempSrc: Oral    SpO2: 97%   Weight: 102 kg (224 lb 13.9 oz)       Physical Exam  Constitutional:       General: He is not in acute distress.     Appearance: He is well-developed. He is not ill-appearing or diaphoretic.   HENT:      Head: Normocephalic and atraumatic.   Eyes:      Conjunctiva/sclera: Conjunctivae normal.   Neck:      Musculoskeletal: Normal range of motion and neck supple.   Cardiovascular:      Rate and Rhythm: Normal rate and regular rhythm.      Heart sounds: Normal heart sounds. No murmur. No friction rub. No gallop.    Pulmonary:      Effort: Pulmonary effort is normal. No respiratory distress.      Breath sounds: Normal breath sounds. No stridor. No wheezing, rhonchi or rales.   Chest:      Chest wall: No tenderness.   Abdominal:      General: Bowel sounds are normal. There is no distension.      Palpations: Abdomen is soft. There is no mass.      Tenderness: There is no abdominal tenderness. There is no guarding or rebound.      Hernia: No hernia is present.   Neurological:      Mental Status: He is alert and oriented to person, place, and time.   Psychiatric:         Mood and Affect: Mood normal.         Assessment:       1. Hospital discharge follow-up    2. FRENCH (acute kidney injury)    3. Essential hypertension        Plan:       Slime was seen today for hospital follow up.    Diagnoses and all orders for this visit:    Hospital discharge follow-up    FRENCH (acute kidney injury)  Will recheck BMP today.  Advised to continue to stay hydrated.  One not use statins anymore as this is only thing that I can see that could have caused this.    Essential hypertension  Blood pressure controlled without amlodipine

## 2020-09-03 LAB — HCV AB SERPL QL IA: NEGATIVE

## 2021-04-21 ENCOUNTER — OFFICE VISIT (OUTPATIENT)
Dept: FAMILY MEDICINE | Facility: CLINIC | Age: 41
End: 2021-04-21
Payer: MEDICAID

## 2021-04-21 DIAGNOSIS — J45.40 MODERATE PERSISTENT ASTHMA, UNSPECIFIED WHETHER COMPLICATED: Primary | ICD-10-CM

## 2021-04-21 DIAGNOSIS — G89.29 CHRONIC LOW BACK PAIN WITHOUT SCIATICA, UNSPECIFIED BACK PAIN LATERALITY: ICD-10-CM

## 2021-04-21 DIAGNOSIS — M54.50 CHRONIC LOW BACK PAIN WITHOUT SCIATICA, UNSPECIFIED BACK PAIN LATERALITY: ICD-10-CM

## 2021-04-21 DIAGNOSIS — F41.9 ANXIETY: ICD-10-CM

## 2021-04-21 PROCEDURE — 99214 PR OFFICE/OUTPT VISIT, EST, LEVL IV, 30-39 MIN: ICD-10-PCS | Mod: 95,,, | Performed by: FAMILY MEDICINE

## 2021-04-21 PROCEDURE — 99214 OFFICE O/P EST MOD 30 MIN: CPT | Mod: 95,,, | Performed by: FAMILY MEDICINE

## 2021-04-21 RX ORDER — ALBUTEROL SULFATE 90 UG/1
2 AEROSOL, METERED RESPIRATORY (INHALATION) EVERY 6 HOURS PRN
Qty: 18 G | Refills: 5 | Status: SHIPPED | OUTPATIENT
Start: 2021-04-21 | End: 2022-04-20 | Stop reason: SDUPTHER

## 2021-04-21 RX ORDER — PAROXETINE HYDROCHLORIDE 20 MG/1
20 TABLET, FILM COATED ORAL EVERY MORNING
Qty: 30 TABLET | Refills: 11 | Status: SHIPPED | OUTPATIENT
Start: 2021-04-21 | End: 2021-09-27

## 2021-04-21 RX ORDER — FLUTICASONE PROPIONATE AND SALMETEROL 100; 50 UG/1; UG/1
1 POWDER RESPIRATORY (INHALATION) 2 TIMES DAILY
Qty: 60 EACH | Refills: 11 | Status: SHIPPED | OUTPATIENT
Start: 2021-04-21 | End: 2022-04-20 | Stop reason: SDUPTHER

## 2021-04-28 ENCOUNTER — TELEPHONE (OUTPATIENT)
Dept: FAMILY MEDICINE | Facility: CLINIC | Age: 41
End: 2021-04-28

## 2021-05-21 ENCOUNTER — IMMUNIZATION (OUTPATIENT)
Dept: OBSTETRICS AND GYNECOLOGY | Facility: CLINIC | Age: 41
End: 2021-05-21
Payer: MEDICAID

## 2021-05-21 DIAGNOSIS — Z23 NEED FOR VACCINATION: Primary | ICD-10-CM

## 2021-05-21 PROCEDURE — 91300 COVID-19, MRNA, LNP-S, PF, 30 MCG/0.3 ML DOSE VACCINE: CPT | Mod: PBBFAC

## 2021-06-11 ENCOUNTER — IMMUNIZATION (OUTPATIENT)
Dept: OBSTETRICS AND GYNECOLOGY | Facility: CLINIC | Age: 41
End: 2021-06-11
Payer: MEDICAID

## 2021-06-11 DIAGNOSIS — Z23 NEED FOR VACCINATION: Primary | ICD-10-CM

## 2021-06-11 PROCEDURE — 91300 COVID-19, MRNA, LNP-S, PF, 30 MCG/0.3 ML DOSE VACCINE: CPT | Mod: PBBFAC

## 2021-06-11 PROCEDURE — 0002A COVID-19, MRNA, LNP-S, PF, 30 MCG/0.3 ML DOSE VACCINE: CPT | Mod: PBBFAC

## 2021-09-13 ENCOUNTER — PATIENT MESSAGE (OUTPATIENT)
Dept: FAMILY MEDICINE | Facility: CLINIC | Age: 41
End: 2021-09-13

## 2021-09-14 RX ORDER — TRAMADOL HYDROCHLORIDE 50 MG/1
50 TABLET ORAL EVERY 6 HOURS
OUTPATIENT
Start: 2021-09-14

## 2021-09-21 ENCOUNTER — TELEPHONE (OUTPATIENT)
Dept: FAMILY MEDICINE | Facility: CLINIC | Age: 41
End: 2021-09-21
Payer: MEDICAID

## 2021-09-21 ENCOUNTER — PATIENT MESSAGE (OUTPATIENT)
Dept: FAMILY MEDICINE | Facility: CLINIC | Age: 41
End: 2021-09-21

## 2021-09-21 ENCOUNTER — TELEPHONE (OUTPATIENT)
Dept: FAMILY MEDICINE | Facility: CLINIC | Age: 41
End: 2021-09-21

## 2021-09-27 ENCOUNTER — OFFICE VISIT (OUTPATIENT)
Dept: FAMILY MEDICINE | Facility: CLINIC | Age: 41
End: 2021-09-27
Payer: MEDICAID

## 2021-09-27 VITALS
HEART RATE: 67 BPM | WEIGHT: 229.25 LBS | BODY MASS INDEX: 29.42 KG/M2 | HEIGHT: 74 IN | SYSTOLIC BLOOD PRESSURE: 120 MMHG | DIASTOLIC BLOOD PRESSURE: 80 MMHG | OXYGEN SATURATION: 97 % | TEMPERATURE: 98 F

## 2021-09-27 DIAGNOSIS — G89.29 CHRONIC LOW BACK PAIN WITHOUT SCIATICA, UNSPECIFIED BACK PAIN LATERALITY: Primary | ICD-10-CM

## 2021-09-27 DIAGNOSIS — E78.5 HYPERLIPIDEMIA, UNSPECIFIED HYPERLIPIDEMIA TYPE: ICD-10-CM

## 2021-09-27 DIAGNOSIS — M54.50 CHRONIC LOW BACK PAIN WITHOUT SCIATICA, UNSPECIFIED BACK PAIN LATERALITY: Primary | ICD-10-CM

## 2021-09-27 DIAGNOSIS — F41.9 ANXIETY: ICD-10-CM

## 2021-09-27 PROCEDURE — 99999 PR PBB SHADOW E&M-EST. PATIENT-LVL III: ICD-10-PCS | Mod: PBBFAC,,, | Performed by: FAMILY MEDICINE

## 2021-09-27 PROCEDURE — 99213 OFFICE O/P EST LOW 20 MIN: CPT | Mod: PBBFAC,PO | Performed by: FAMILY MEDICINE

## 2021-09-27 PROCEDURE — 99214 OFFICE O/P EST MOD 30 MIN: CPT | Mod: S$PBB,,, | Performed by: FAMILY MEDICINE

## 2021-09-27 PROCEDURE — 99214 PR OFFICE/OUTPT VISIT, EST, LEVL IV, 30-39 MIN: ICD-10-PCS | Mod: S$PBB,,, | Performed by: FAMILY MEDICINE

## 2021-09-27 PROCEDURE — 99999 PR PBB SHADOW E&M-EST. PATIENT-LVL III: CPT | Mod: PBBFAC,,, | Performed by: FAMILY MEDICINE

## 2021-09-27 RX ORDER — TRAMADOL HYDROCHLORIDE 50 MG/1
50 TABLET ORAL EVERY 6 HOURS
Qty: 45 TABLET | Refills: 3 | Status: SHIPPED | OUTPATIENT
Start: 2021-09-27 | End: 2022-02-02 | Stop reason: SDUPTHER

## 2021-09-27 RX ORDER — PAROXETINE HYDROCHLORIDE 40 MG/1
40 TABLET, FILM COATED ORAL EVERY MORNING
Qty: 30 TABLET | Refills: 11 | Status: SHIPPED | OUTPATIENT
Start: 2021-09-27 | End: 2022-02-02

## 2021-09-27 RX ORDER — LORAZEPAM 0.5 MG/1
0.5 TABLET ORAL NIGHTLY PRN
Qty: 30 TABLET | Refills: 2 | Status: SHIPPED | OUTPATIENT
Start: 2021-09-27 | End: 2022-02-02 | Stop reason: SDUPTHER

## 2021-09-27 RX ORDER — TRAMADOL HYDROCHLORIDE 50 MG/1
50 TABLET ORAL EVERY 6 HOURS
COMMUNITY
End: 2021-09-27 | Stop reason: SDUPTHER

## 2021-12-20 ENCOUNTER — HOSPITAL ENCOUNTER (EMERGENCY)
Facility: HOSPITAL | Age: 41
Discharge: HOME | End: 2021-12-20
Attending: EMERGENCY MEDICINE
Payer: MEDICAID

## 2021-12-20 VITALS
HEART RATE: 95 BPM | WEIGHT: 225 LBS | SYSTOLIC BLOOD PRESSURE: 141 MMHG | DIASTOLIC BLOOD PRESSURE: 85 MMHG | OXYGEN SATURATION: 98 % | BODY MASS INDEX: 28.88 KG/M2 | HEIGHT: 74 IN | TEMPERATURE: 99 F | RESPIRATION RATE: 18 BRPM

## 2021-12-20 DIAGNOSIS — M79.10 MYALGIA: ICD-10-CM

## 2021-12-20 DIAGNOSIS — J10.1 INFLUENZA A: Primary | ICD-10-CM

## 2021-12-20 LAB
CTP QC/QA: YES
CTP QC/QA: YES
POC MOLECULAR INFLUENZA A AGN: POSITIVE
POC MOLECULAR INFLUENZA B AGN: NEGATIVE
SARS-COV-2 RDRP RESP QL NAA+PROBE: NEGATIVE

## 2021-12-20 PROCEDURE — 99283 EMERGENCY DEPT VISIT LOW MDM: CPT | Mod: 25

## 2021-12-20 PROCEDURE — 25000003 PHARM REV CODE 250: Performed by: PHYSICIAN ASSISTANT

## 2021-12-20 PROCEDURE — U0002 COVID-19 LAB TEST NON-CDC: HCPCS | Performed by: PHYSICIAN ASSISTANT

## 2021-12-20 PROCEDURE — 87502 INFLUENZA DNA AMP PROBE: CPT

## 2021-12-20 RX ORDER — OSELTAMIVIR PHOSPHATE 75 MG/1
75 CAPSULE ORAL 2 TIMES DAILY
Qty: 10 CAPSULE | Refills: 0 | Status: SHIPPED | OUTPATIENT
Start: 2021-12-20 | End: 2021-12-25

## 2021-12-20 RX ORDER — IBUPROFEN 600 MG/1
600 TABLET ORAL
Status: COMPLETED | OUTPATIENT
Start: 2021-12-20 | End: 2021-12-20

## 2021-12-20 RX ADMIN — IBUPROFEN 600 MG: 600 TABLET, FILM COATED ORAL at 11:12

## 2022-01-27 ENCOUNTER — TELEPHONE (OUTPATIENT)
Dept: FAMILY MEDICINE | Facility: CLINIC | Age: 42
End: 2022-01-27
Payer: MEDICAID

## 2022-02-02 ENCOUNTER — OFFICE VISIT (OUTPATIENT)
Dept: FAMILY MEDICINE | Facility: CLINIC | Age: 42
End: 2022-02-02
Payer: MEDICAID

## 2022-02-02 VITALS
HEART RATE: 70 BPM | WEIGHT: 224.88 LBS | SYSTOLIC BLOOD PRESSURE: 132 MMHG | HEIGHT: 74 IN | TEMPERATURE: 98 F | OXYGEN SATURATION: 97 % | BODY MASS INDEX: 28.86 KG/M2 | DIASTOLIC BLOOD PRESSURE: 96 MMHG

## 2022-02-02 DIAGNOSIS — G89.29 CHRONIC LOW BACK PAIN WITHOUT SCIATICA, UNSPECIFIED BACK PAIN LATERALITY: ICD-10-CM

## 2022-02-02 DIAGNOSIS — F41.9 ANXIETY: ICD-10-CM

## 2022-02-02 DIAGNOSIS — F41.9 ANXIETY: Primary | ICD-10-CM

## 2022-02-02 DIAGNOSIS — M54.50 CHRONIC LOW BACK PAIN WITHOUT SCIATICA, UNSPECIFIED BACK PAIN LATERALITY: ICD-10-CM

## 2022-02-02 DIAGNOSIS — F43.21 GRIEF: ICD-10-CM

## 2022-02-02 PROCEDURE — 3075F SYST BP GE 130 - 139MM HG: CPT | Mod: CPTII,,, | Performed by: FAMILY MEDICINE

## 2022-02-02 PROCEDURE — 99999 PR PBB SHADOW E&M-EST. PATIENT-LVL III: ICD-10-PCS | Mod: PBBFAC,,, | Performed by: FAMILY MEDICINE

## 2022-02-02 PROCEDURE — 3080F DIAST BP >= 90 MM HG: CPT | Mod: CPTII,,, | Performed by: FAMILY MEDICINE

## 2022-02-02 PROCEDURE — 3080F PR MOST RECENT DIASTOLIC BLOOD PRESSURE >= 90 MM HG: ICD-10-PCS | Mod: CPTII,,, | Performed by: FAMILY MEDICINE

## 2022-02-02 PROCEDURE — 3008F PR BODY MASS INDEX (BMI) DOCUMENTED: ICD-10-PCS | Mod: CPTII,,, | Performed by: FAMILY MEDICINE

## 2022-02-02 PROCEDURE — 99214 OFFICE O/P EST MOD 30 MIN: CPT | Mod: S$PBB,,, | Performed by: FAMILY MEDICINE

## 2022-02-02 PROCEDURE — 3075F PR MOST RECENT SYSTOLIC BLOOD PRESS GE 130-139MM HG: ICD-10-PCS | Mod: CPTII,,, | Performed by: FAMILY MEDICINE

## 2022-02-02 PROCEDURE — 99213 OFFICE O/P EST LOW 20 MIN: CPT | Mod: PBBFAC,PO | Performed by: FAMILY MEDICINE

## 2022-02-02 PROCEDURE — 3008F BODY MASS INDEX DOCD: CPT | Mod: CPTII,,, | Performed by: FAMILY MEDICINE

## 2022-02-02 PROCEDURE — 99999 PR PBB SHADOW E&M-EST. PATIENT-LVL III: CPT | Mod: PBBFAC,,, | Performed by: FAMILY MEDICINE

## 2022-02-02 PROCEDURE — 99214 PR OFFICE/OUTPT VISIT, EST, LEVL IV, 30-39 MIN: ICD-10-PCS | Mod: S$PBB,,, | Performed by: FAMILY MEDICINE

## 2022-02-02 RX ORDER — LORAZEPAM 0.5 MG/1
0.5 TABLET ORAL NIGHTLY PRN
Qty: 30 TABLET | Refills: 2 | Status: SHIPPED | OUTPATIENT
Start: 2022-02-02 | End: 2022-12-13 | Stop reason: SDUPTHER

## 2022-02-02 RX ORDER — SERTRALINE HYDROCHLORIDE 25 MG/1
25 TABLET, FILM COATED ORAL DAILY
Qty: 30 TABLET | Refills: 11 | Status: SHIPPED | OUTPATIENT
Start: 2022-02-02 | End: 2022-04-27

## 2022-02-02 RX ORDER — TRAMADOL HYDROCHLORIDE 50 MG/1
50 TABLET ORAL EVERY 6 HOURS
Qty: 45 TABLET | Refills: 3 | Status: SHIPPED | OUTPATIENT
Start: 2022-02-02 | End: 2022-04-27 | Stop reason: SDUPTHER

## 2022-02-02 NOTE — PROGRESS NOTES
Subjective:       Patient ID: Slime Bradshaw is a 41 y.o. male.    Chief Complaint: Follow up/ Medications and Insomnia    41-year-old male presents today a shows with depression.  He recently lost his wife to cancer.  In has been dealing with the grief from this.  He also is experiencing some stress with the family with regards to handling affairs.  He states he is not taking the Paxil as it made him extremely drowsy. He does use the ativan, but has not taken it as he can't go into the hospital to get it from that pharmacy. He would like to send his presctiption to a different location.     He also would like a refill of his tramadol. He uses thisintermitttently for lower back pain.     No past medical history on file.   Past Surgical History:  No date: ANTERIOR CRUCIATE LIGAMENT REPAIR; Right  No date: KNEE ARTHROSCOPY W/ ACL RECONSTRUCTION      Comment:  right  No date: WISDOM TOOTH EXTRACTION  Review of patient's family history indicates:  Problem: Cancer      Relation: Father          Age of Onset: (Not Specified)          Comment: throat-- smoker  Problem: Hypertension      Relation: Mother          Age of Onset: (Not Specified)  Problem: Diabetes      Relation: Mother          Age of Onset: (Not Specified)  Problem: Hypertension      Relation: Brother          Age of Onset: (Not Specified)  Problem: Lupus      Relation: Sister          Age of Onset: (Not Specified)  Problem: No Known Problems      Relation: Daughter          Age of Onset: (Not Specified)  Problem: No Known Problems      Relation: Son          Age of Onset: (Not Specified)  Problem: COPD      Relation: Maternal Grandmother          Age of Onset: (Not Specified)  Problem: Eczema      Relation: Daughter          Age of Onset: (Not Specified)  Problem: Asthma      Relation: Son          Age of Onset: (Not Specified)  Problem: No Known Problems      Relation: Son          Age of Onset: (Not Specified)  Problem: Prostate cancer       "Relation: Neg Hx          Age of Onset: (Not Specified)    Social History    Socioeconomic History      Marital status:     Tobacco Use      Smoking status: Former Smoker        Types: Vaping w/o nicotine      Smokeless tobacco: Never Used      Tobacco comment: No longer vapes    Substance and Sexual Activity      Alcohol use: Not Currently        Alcohol/week: 0.0 - 1.7 standard drinks        Comment: None x 3 years       Drug use: Yes        Types: Marijuana        Comment: No longer smokes       Sexual activity: Yes        Partners: Female    Social History Narrative      Works as a  for [employer]    Social Determinants of Health  Financial Resource Strain: Low Risk       Difficulty of Paying Living Expenses: Not very hard  Food Insecurity: No Food Insecurity      Worried About Running Out of Food in the Last Year: Never true      Ran Out of Food in the Last Year: Never true  Transportation Needs: No Transportation Needs      Lack of Transportation (Medical): No      Lack of Transportation (Non-Medical): No  Physical Activity: Unknown      Days of Exercise per Week: 4 days  Stress: Stress Concern Present      Feeling of Stress : Rather much  Social Connections: Unknown      Frequency of Communication with Friends and Family: Three times a week      Frequency of Social Gatherings with Friends and Family: Once a week      Active Member of Clubs or Organizations: Yes      Attends Club or Organization Meetings: 1 to 4 times per year      Marital Status:   Housing Stability: Low Risk       Unable to Pay for Housing in the Last Year: No      Number of Places Lived in the Last Year: 1      Unstable Housing in the Last Year: No      Review of Systems      Objective:       Vitals:    02/02/22 0843   BP: (!) 132/96   Pulse: 70   Temp: 98.2 °F (36.8 °C)   TempSrc: Oral   SpO2: 97%   Weight: 102 kg (224 lb 13.9 oz)   Height: 6' 2" (1.88 m)       Physical Exam  Constitutional:       General: He " is not in acute distress.     Appearance: Normal appearance. He is not ill-appearing, toxic-appearing or diaphoretic.   HENT:      Head: Normocephalic and atraumatic.   Neurological:      Mental Status: He is alert.   Psychiatric:         Attention and Perception: Attention normal.         Mood and Affect: Mood is depressed.         Speech: Speech normal.         Behavior: Behavior normal.         Thought Content: Thought content normal.         Cognition and Memory: Cognition normal.         Judgment: Judgment normal.         Assessment:       Problem List Items Addressed This Visit    None     Visit Diagnoses     Anxiety    -  Primary    PAXIL made him sleepy  Wellbutrin made him irrritable. prozac worked until his sister passed away.   lexapro didn't work.     Relevant Medications    LORazepam (ATIVAN) 0.5 MG tablet    Grief        Relevant Medications    sertraline (ZOLOFT) 25 MG tablet    Anxiety        Relevant Medications    LORazepam (ATIVAN) 0.5 MG tablet    Chronic low back pain without sciatica, unspecified back pain laterality        Relevant Medications    traMADoL (ULTRAM) 50 mg tablet          Plan:       Slime was seen today for follow up/ medications and insomnia.    Diagnoses and all orders for this visit:    Anxiety  Comments:  PAXIL made him sleepy  Wellbutrin made him irrritable. prozac worked until his sister passed away.   lexapro didn't work.   Orders:  -     LORazepam (ATIVAN) 0.5 MG tablet; Take 1 tablet (0.5 mg total) by mouth nightly as needed for Anxiety.    Grief  -     sertraline (ZOLOFT) 25 MG tablet; Take 1 tablet (25 mg total) by mouth once daily.    Anxiety  -     LORazepam (ATIVAN) 0.5 MG tablet; Take 1 tablet (0.5 mg total) by mouth nightly as needed for Anxiety.    Chronic low back pain without sciatica, unspecified back pain laterality  -     traMADoL (ULTRAM) 50 mg tablet; Take 1 tablet (50 mg total) by mouth every 6 (six) hours.

## 2022-03-08 ENCOUNTER — TELEPHONE (OUTPATIENT)
Dept: ADMINISTRATIVE | Facility: HOSPITAL | Age: 42
End: 2022-03-08
Payer: MEDICAID

## 2022-03-08 ENCOUNTER — PATIENT OUTREACH (OUTPATIENT)
Dept: ADMINISTRATIVE | Facility: HOSPITAL | Age: 42
End: 2022-03-08
Payer: MEDICAID

## 2022-03-08 NOTE — PROGRESS NOTES
Address BP, Pt showing as non-compliant for the BP measure. Portal message sent for patient to contact office to schedule a nurse visit for BP check.

## 2022-04-20 DIAGNOSIS — G89.29 CHRONIC LOW BACK PAIN WITHOUT SCIATICA, UNSPECIFIED BACK PAIN LATERALITY: ICD-10-CM

## 2022-04-20 DIAGNOSIS — J45.40 MODERATE PERSISTENT ASTHMA, UNSPECIFIED WHETHER COMPLICATED: ICD-10-CM

## 2022-04-20 DIAGNOSIS — M54.50 CHRONIC LOW BACK PAIN WITHOUT SCIATICA, UNSPECIFIED BACK PAIN LATERALITY: ICD-10-CM

## 2022-04-20 NOTE — TELEPHONE ENCOUNTER
No new care gaps identified.  Powered by [employer]. Reference number: [id number].   4/20/2022 4:30:41 PM CDT

## 2022-04-20 NOTE — TELEPHONE ENCOUNTER
LOV  2/2/2022  Last refill  Tramadol 9/27/2021                    Ventolin 4/21/2021                     Advair 4/21/2021

## 2022-04-21 RX ORDER — FLUTICASONE PROPIONATE AND SALMETEROL 100; 50 UG/1; UG/1
1 POWDER RESPIRATORY (INHALATION) 2 TIMES DAILY
Qty: 60 EACH | Refills: 11 | Status: SHIPPED | OUTPATIENT
Start: 2022-04-21 | End: 2023-04-21

## 2022-04-21 RX ORDER — TRAMADOL HYDROCHLORIDE 50 MG/1
50 TABLET ORAL EVERY 6 HOURS
Qty: 45 TABLET | Refills: 3 | OUTPATIENT
Start: 2022-04-21

## 2022-04-21 RX ORDER — ALBUTEROL SULFATE 90 UG/1
2 AEROSOL, METERED RESPIRATORY (INHALATION) EVERY 6 HOURS PRN
Qty: 18 G | Refills: 5 | Status: SHIPPED | OUTPATIENT
Start: 2022-04-21 | End: 2023-06-15

## 2022-04-25 NOTE — TELEPHONE ENCOUNTER
Patient informed of refill approval for Ventolin and Advair and refill denial for Tramadol.  Patient advised that an office visit is needed to refill Tramadol.  Patient verbalized understanding, scheduled virtual visit for 4/27/2022.

## 2022-04-27 ENCOUNTER — OFFICE VISIT (OUTPATIENT)
Dept: FAMILY MEDICINE | Facility: CLINIC | Age: 42
End: 2022-04-27
Payer: MEDICAID

## 2022-04-27 DIAGNOSIS — G89.29 CHRONIC LOW BACK PAIN WITHOUT SCIATICA, UNSPECIFIED BACK PAIN LATERALITY: ICD-10-CM

## 2022-04-27 DIAGNOSIS — M54.50 CHRONIC LOW BACK PAIN WITHOUT SCIATICA, UNSPECIFIED BACK PAIN LATERALITY: ICD-10-CM

## 2022-04-27 DIAGNOSIS — F39 MOOD DISORDER: Primary | ICD-10-CM

## 2022-04-27 PROCEDURE — 99214 PR OFFICE/OUTPT VISIT, EST, LEVL IV, 30-39 MIN: ICD-10-PCS | Mod: 95,,, | Performed by: FAMILY MEDICINE

## 2022-04-27 PROCEDURE — 99214 OFFICE O/P EST MOD 30 MIN: CPT | Mod: 95,,, | Performed by: FAMILY MEDICINE

## 2022-04-27 RX ORDER — DESVENLAFAXINE SUCCINATE 50 MG/1
50 TABLET, EXTENDED RELEASE ORAL DAILY
Qty: 30 TABLET | Refills: 11 | Status: SHIPPED | OUTPATIENT
Start: 2022-04-27 | End: 2022-12-13 | Stop reason: ALTCHOICE

## 2022-04-27 RX ORDER — TRAMADOL HYDROCHLORIDE 50 MG/1
50 TABLET ORAL EVERY 6 HOURS
Qty: 45 TABLET | Refills: 3 | Status: SHIPPED | OUTPATIENT
Start: 2022-04-27 | End: 2022-12-13

## 2022-04-27 NOTE — PROGRESS NOTES
Subjective:       Patient ID: Slime Bradshaw is a 41 y.o. male.    Chief Complaint: No chief complaint on file.    The patient location is: [place]  The chief complaint leading to consultation is: medication refills    Visit type: audiovisual    Face to Face time with patient:   12 minutes of total time spent on the encounter, which includes face to face time and non-face to face time preparing to see the patient (eg, review of tests), Obtaining and/or reviewing separately obtained history, Documenting clinical information in the electronic or other health record, Independently interpreting results (not separately reported) and communicating results to the patient/family/caregiver, or Care coordination (not separately reported).         Each patient to whom he or she provides medical services by telemedicine is:  (1) informed of the relationship between the physician and patient and the respective role of any other health care provider with respect to management of the patient; and (2) notified that he or she may decline to receive medical services by telemedicine and may withdraw from such care at any time.    Notes:     41 year old male presents for follow up. He has been sluggish, tired ,a nd feeling a little down. He is not sleeping at night. He has tried to avoid the ativan as he feels sluggish the next day.he gets 4-5 hours of sleep at night.  He states he cut the ativan in half and he is still sluggish. He has been irritable. He is not taking the zoloft as he feels dazed on this. He has no suicidal ideation or homicidal ideation. He states he lacks motivation to get things done.     Review of Systems   Constitutional: Negative for activity change and unexpected weight change.   HENT: Negative for hearing loss, rhinorrhea and trouble swallowing.    Eyes: Negative for discharge and visual disturbance.   Respiratory: Negative for chest tightness and wheezing.    Cardiovascular: Negative for chest pain  and palpitations.   Gastrointestinal: Negative for blood in stool, constipation, diarrhea and vomiting.   Endocrine: Negative for polydipsia and polyuria.   Genitourinary: Negative for difficulty urinating, hematuria and urgency.   Musculoskeletal: Negative for arthralgias, joint swelling and neck pain.   Neurological: Negative for weakness and headaches.   Psychiatric/Behavioral: Negative for confusion and dysphoric mood.         Objective:      Physical Exam    Assessment:       Problem List Items Addressed This Visit    None     Visit Diagnoses     Mood disorder    -  Primary    Relevant Medications    desvenlafaxine succinate (PRISTIQ) 50 MG Tb24    Chronic low back pain without sciatica, unspecified back pain laterality        Relevant Medications    traMADoL (ULTRAM) 50 mg tablet          Plan:       Diagnoses and all orders for this visit:    Mood disorder  -     desvenlafaxine succinate (PRISTIQ) 50 MG Tb24; Take 1 tablet (50 mg total) by mouth once daily.  Trial of pristiq for his depression. Failed lexapro as it didn't work, zoloft made him feel dazed and prozac worked, but temporarily    Chronic low back pain without sciatica, unspecified back pain laterality  -     traMADoL (ULTRAM) 50 mg tablet; Take 1 tablet (50 mg total) by mouth every 6 (six) hours.  Stable. Refilled meds.

## 2022-04-27 NOTE — PROGRESS NOTES
Answers for HPI/ROS submitted by the patient on 4/27/2022  activity change: No  unexpected weight change: No  neck pain: No  hearing loss: No  rhinorrhea: No  trouble swallowing: No  eye discharge: No  visual disturbance: No  chest tightness: No  wheezing: No  chest pain: No  palpitations: No  blood in stool: No  constipation: No  vomiting: No  diarrhea: No  polydipsia: No  polyuria: No  difficulty urinating: No  urgency: No  hematuria: No  joint swelling: No  arthralgias: No  headaches: No  weakness: No  confusion: No  dysphoric mood: No

## 2022-04-29 ENCOUNTER — TELEPHONE (OUTPATIENT)
Dept: FAMILY MEDICINE | Facility: CLINIC | Age: 42
End: 2022-04-29
Payer: MEDICAID

## 2022-05-02 NOTE — TELEPHONE ENCOUNTER
Originally PA was denied for the desvenlafaxine for not trying alternate medication.    Pt has tried one on the alternate meds so 2nd PA was submitted via covermymeds

## 2022-05-25 ENCOUNTER — TELEPHONE (OUTPATIENT)
Dept: FAMILY MEDICINE | Facility: CLINIC | Age: 42
End: 2022-05-25
Payer: MEDICAID

## 2022-07-10 DIAGNOSIS — M54.50 CHRONIC LOW BACK PAIN WITHOUT SCIATICA, UNSPECIFIED BACK PAIN LATERALITY: ICD-10-CM

## 2022-07-10 DIAGNOSIS — G89.29 CHRONIC LOW BACK PAIN WITHOUT SCIATICA, UNSPECIFIED BACK PAIN LATERALITY: ICD-10-CM

## 2022-07-10 NOTE — TELEPHONE ENCOUNTER
Care Due:                  Date            Visit Type   Department     Provider  --------------------------------------------------------------------------------                                ESTABLISHED   [facility]                              PATIENT -    MEDICINE/INTERN  Last Visit: 04-      [facility]         Lindsey Phelps  Next Visit: None Scheduled  None         None Found                                                            Last  Test          Frequency    Reason                     Performed    Due Date  --------------------------------------------------------------------------------    Cr..........  12 months..  desvenlafaxine...........  09- 08-    [facility] Embedded Care Gaps. Reference number: [id number]. 7/10/2022   1:03:25 PM CDT

## 2022-07-11 NOTE — TELEPHONE ENCOUNTER
Last Office Visit Info:   The patient's last visit with Lindsey Lozoya MD was on 4/27/2022.    The patient's last visit in current department was on 4/27/2022.        Last CBC Results:   Lab Results   Component Value Date    WBC 4.96 09/02/2020    HGB 14.4 09/02/2020    HCT 40.9 09/02/2020     09/02/2020       Last CMP Results  Lab Results   Component Value Date     09/02/2020    K 4.1 09/02/2020     09/02/2020    CO2 27 09/02/2020    BUN 15 09/02/2020    CREATININE 1.4 09/02/2020    CALCIUM 9.7 09/02/2020    ALBUMIN 3.9 09/02/2020    AST 44 (H) 09/02/2020    ALT 44 09/02/2020       Last Lipids  Lab Results   Component Value Date    CHOL 283 (H) 02/11/2020    TRIG 121 02/11/2020    HDL 45 02/11/2020    LDLCALC 213.8 (H) 02/11/2020       Last A1C  No results found for: HGBA1C    Last TSH  Lab Results   Component Value Date    TSH 0.344 (L) 02/11/2020             Current Med Refills  Medication List with Changes/Refills   Current Medications    ALBUTEROL (VENTOLIN HFA) 90 MCG/ACTUATION INHALER    Inhale 2 puffs into the lungs every 6 (six) hours as needed for Wheezing. Rescue       Start Date: 4/21/2022 End Date: 4/21/2023    DESVENLAFAXINE SUCCINATE (PRISTIQ) 50 MG TB24    Take 1 tablet (50 mg total) by mouth once daily.       Start Date: 4/27/2022 End Date: 4/27/2023    FLUTICASONE-SALMETEROL DISKUS INHALER 100-50 MCG    Inhale 1 puff into the lungs 2 (two) times daily. Controller       Start Date: 4/21/2022 End Date: 4/21/2023    LORAZEPAM (ATIVAN) 0.5 MG TABLET    Take 1 tablet (0.5 mg total) by mouth nightly as needed for Anxiety.       Start Date: 2/2/2022  End Date: 3/4/2022    TRAMADOL (ULTRAM) 50 MG TABLET    take 1 tablet by mouth every 6 hours       Start Date: 9/27/2021 End Date: --    TRAMADOL (ULTRAM) 50 MG TABLET    Take 1 tablet (50 mg total) by mouth every 6 (six) hours.       Start Date: 4/27/2022 End Date: --

## 2022-07-18 RX ORDER — TRAMADOL HYDROCHLORIDE 50 MG/1
TABLET ORAL
Qty: 45 TABLET | OUTPATIENT
Start: 2022-07-18

## 2022-08-11 DIAGNOSIS — G89.29 CHRONIC LOW BACK PAIN WITHOUT SCIATICA, UNSPECIFIED BACK PAIN LATERALITY: ICD-10-CM

## 2022-08-11 DIAGNOSIS — M54.50 CHRONIC LOW BACK PAIN WITHOUT SCIATICA, UNSPECIFIED BACK PAIN LATERALITY: ICD-10-CM

## 2022-08-12 RX ORDER — TRAMADOL HYDROCHLORIDE 50 MG/1
50 TABLET ORAL EVERY 6 HOURS
Qty: 45 TABLET | Refills: 3 | OUTPATIENT
Start: 2022-08-12

## 2022-09-05 ENCOUNTER — PATIENT MESSAGE (OUTPATIENT)
Dept: ADMINISTRATIVE | Facility: OTHER | Age: 42
End: 2022-09-05
Payer: COMMERCIAL

## 2022-09-27 DIAGNOSIS — I10 ESSENTIAL HYPERTENSION: ICD-10-CM

## 2022-09-30 ENCOUNTER — PATIENT MESSAGE (OUTPATIENT)
Dept: FAMILY MEDICINE | Facility: CLINIC | Age: 42
End: 2022-09-30
Payer: COMMERCIAL

## 2022-12-13 ENCOUNTER — OFFICE VISIT (OUTPATIENT)
Dept: FAMILY MEDICINE | Facility: CLINIC | Age: 42
End: 2022-12-13
Payer: MEDICAID

## 2022-12-13 VITALS
OXYGEN SATURATION: 97 % | HEART RATE: 73 BPM | DIASTOLIC BLOOD PRESSURE: 80 MMHG | HEIGHT: 74 IN | BODY MASS INDEX: 28.58 KG/M2 | SYSTOLIC BLOOD PRESSURE: 118 MMHG | WEIGHT: 222.69 LBS

## 2022-12-13 DIAGNOSIS — M54.50 CHRONIC LOW BACK PAIN WITHOUT SCIATICA, UNSPECIFIED BACK PAIN LATERALITY: ICD-10-CM

## 2022-12-13 DIAGNOSIS — G89.29 CHRONIC LOW BACK PAIN WITHOUT SCIATICA, UNSPECIFIED BACK PAIN LATERALITY: ICD-10-CM

## 2022-12-13 DIAGNOSIS — F41.9 ANXIETY: ICD-10-CM

## 2022-12-13 DIAGNOSIS — E78.5 HYPERLIPIDEMIA, UNSPECIFIED HYPERLIPIDEMIA TYPE: Primary | ICD-10-CM

## 2022-12-13 PROCEDURE — 1159F PR MEDICATION LIST DOCUMENTED IN MEDICAL RECORD: ICD-10-PCS | Mod: CPTII,,, | Performed by: FAMILY MEDICINE

## 2022-12-13 PROCEDURE — 3074F SYST BP LT 130 MM HG: CPT | Mod: CPTII,,, | Performed by: FAMILY MEDICINE

## 2022-12-13 PROCEDURE — 99214 PR OFFICE/OUTPT VISIT, EST, LEVL IV, 30-39 MIN: ICD-10-PCS | Mod: S$PBB,,, | Performed by: FAMILY MEDICINE

## 2022-12-13 PROCEDURE — 3079F PR MOST RECENT DIASTOLIC BLOOD PRESSURE 80-89 MM HG: ICD-10-PCS | Mod: CPTII,,, | Performed by: FAMILY MEDICINE

## 2022-12-13 PROCEDURE — 1159F MED LIST DOCD IN RCRD: CPT | Mod: CPTII,,, | Performed by: FAMILY MEDICINE

## 2022-12-13 PROCEDURE — 3079F DIAST BP 80-89 MM HG: CPT | Mod: CPTII,,, | Performed by: FAMILY MEDICINE

## 2022-12-13 PROCEDURE — 3008F PR BODY MASS INDEX (BMI) DOCUMENTED: ICD-10-PCS | Mod: CPTII,,, | Performed by: FAMILY MEDICINE

## 2022-12-13 PROCEDURE — 3008F BODY MASS INDEX DOCD: CPT | Mod: CPTII,,, | Performed by: FAMILY MEDICINE

## 2022-12-13 PROCEDURE — 99214 OFFICE O/P EST MOD 30 MIN: CPT | Mod: S$PBB,,, | Performed by: FAMILY MEDICINE

## 2022-12-13 PROCEDURE — 99213 OFFICE O/P EST LOW 20 MIN: CPT | Mod: PBBFAC,PO | Performed by: FAMILY MEDICINE

## 2022-12-13 PROCEDURE — 3074F PR MOST RECENT SYSTOLIC BLOOD PRESSURE < 130 MM HG: ICD-10-PCS | Mod: CPTII,,, | Performed by: FAMILY MEDICINE

## 2022-12-13 PROCEDURE — 99999 PR PBB SHADOW E&M-EST. PATIENT-LVL III: ICD-10-PCS | Mod: PBBFAC,,, | Performed by: FAMILY MEDICINE

## 2022-12-13 PROCEDURE — 1160F RVW MEDS BY RX/DR IN RCRD: CPT | Mod: CPTII,,, | Performed by: FAMILY MEDICINE

## 2022-12-13 PROCEDURE — 99999 PR PBB SHADOW E&M-EST. PATIENT-LVL III: CPT | Mod: PBBFAC,,, | Performed by: FAMILY MEDICINE

## 2022-12-13 PROCEDURE — 1160F PR REVIEW ALL MEDS BY PRESCRIBER/CLIN PHARMACIST DOCUMENTED: ICD-10-PCS | Mod: CPTII,,, | Performed by: FAMILY MEDICINE

## 2022-12-13 RX ORDER — LORAZEPAM 0.5 MG/1
0.5 TABLET ORAL NIGHTLY PRN
Qty: 30 TABLET | Refills: 2 | Status: SHIPPED | OUTPATIENT
Start: 2022-12-13 | End: 2023-01-12

## 2022-12-13 RX ORDER — TRAMADOL HYDROCHLORIDE 50 MG/1
TABLET ORAL
Qty: 45 TABLET | Refills: 3 | Status: CANCELLED | OUTPATIENT
Start: 2022-12-13

## 2022-12-13 RX ORDER — NALOXONE HYDROCHLORIDE 4 MG/.1ML
SPRAY NASAL
Qty: 1 EACH | Refills: 11 | Status: SHIPPED | OUTPATIENT
Start: 2022-12-13

## 2022-12-13 RX ORDER — TADALAFIL 10 MG/1
10 TABLET ORAL
COMMUNITY
Start: 2022-06-23 | End: 2023-02-27

## 2022-12-13 RX ORDER — TRAMADOL HYDROCHLORIDE 50 MG/1
TABLET ORAL
Qty: 45 TABLET | Refills: 3 | Status: SHIPPED | OUTPATIENT
Start: 2022-12-13

## 2022-12-13 RX ORDER — LISDEXAMFETAMINE DIMESYLATE 30 MG/1
CAPSULE ORAL
COMMUNITY
Start: 2022-11-28

## 2022-12-13 NOTE — PROGRESS NOTES
Subjective:       Patient ID: Slime Bradshaw is a 42 y.o. male.    Chief Complaint: Follow Up/ Back Pain    42 year old male presents for refills of his medications. He states he takes the tramadol as needed. He uses tylenol and ibuprofen as needed or back pain. He denies loss of  control of his bowels or his bladder.    He admits to anal itching. He admits to some blood in his stool with wiping. He has no constipation, but does have frequent bowel movements.    He hasn't taken the ativan in several months. He would like to have it for nighttime when he is restless.     No past medical history on file.   Past Surgical History:   Procedure Laterality Date    ANTERIOR CRUCIATE LIGAMENT REPAIR Right     KNEE ARTHROSCOPY W/ ACL RECONSTRUCTION      right    WISDOM TOOTH EXTRACTION       Family History   Problem Relation Age of Onset    Cancer Father         throat-- smoker    Hypertension Mother     Diabetes Mother     Hypertension Brother     Lupus Sister     No Known Problems Daughter     No Known Problems Son     COPD Maternal Grandmother     Eczema Daughter     Asthma Son     No Known Problems Son     Prostate cancer Neg Hx      Social History     Socioeconomic History    Marital status:    Tobacco Use    Smoking status: Former     Types: Vaping w/o nicotine    Smokeless tobacco: Never    Tobacco comments:     No longer vapes   Substance and Sexual Activity    Alcohol use: Not Currently     Alcohol/week: 0.0 - 1.7 standard drinks     Comment: None x 3 years     Drug use: Yes     Types: Marijuana     Comment: No longer smokes     Sexual activity: Yes     Partners: Female   Social History Narrative    Works as a  for [employer]     Social Determinants of Health     Financial Resource Strain: Low Risk     Difficulty of Paying Living Expenses: Not very hard   Food Insecurity: No Food Insecurity    Worried About Running Out of Food in the Last Year: Never true    Ran Out of Food in the Last  Year: Never true   Transportation Needs: No Transportation Needs    Lack of Transportation (Medical): No    Lack of Transportation (Non-Medical): No   Physical Activity: Insufficiently Active    Days of Exercise per Week: 3 days    Minutes of Exercise per Session: 30 min   Stress: Stress Concern Present    Feeling of Stress : To some extent   Social Connections: Unknown    Frequency of Communication with Friends and Family: Twice a week    Frequency of Social Gatherings with Friends and Family: Once a week    Active Member of Clubs or Organizations: Yes    Attends Club or Organization Meetings: More than 4 times per year    Marital Status:    Housing Stability: Low Risk     Unable to Pay for Housing in the Last Year: No    Number of Places Lived in the Last Year: 1    Unstable Housing in the Last Year: No       Review of Systems   Constitutional:  Negative for activity change and unexpected weight change.   HENT:  Negative for hearing loss, rhinorrhea and trouble swallowing.    Eyes:  Negative for discharge and visual disturbance.   Respiratory:  Negative for chest tightness and wheezing.    Cardiovascular:  Negative for chest pain and palpitations.   Gastrointestinal:  Negative for blood in stool, constipation, diarrhea and vomiting.   Endocrine: Negative for polydipsia and polyuria.   Genitourinary:  Negative for bladder incontinence, difficulty urinating, hematuria and urgency.   Musculoskeletal:  Negative for arthralgias, joint swelling and neck pain.   Neurological:  Positive for headaches. Negative for weakness.   Psychiatric/Behavioral:  Negative for confusion and dysphoric mood.        Objective:      Vitals:    12/13/22 0808   BP: 118/80   Pulse: 73       Physical Exam  Constitutional:       General: He is not in acute distress.     Appearance: Normal appearance. He is well-developed. He is not ill-appearing, toxic-appearing or diaphoretic.   HENT:      Head: Normocephalic and atraumatic.       Mouth/Throat:      Pharynx: No oropharyngeal exudate.   Eyes:      Conjunctiva/sclera: Conjunctivae normal.   Cardiovascular:      Rate and Rhythm: Normal rate and regular rhythm.      Heart sounds: Normal heart sounds. No murmur heard.    No friction rub. No gallop.   Pulmonary:      Effort: Pulmonary effort is normal. No respiratory distress.      Breath sounds: Normal breath sounds. No stridor. No wheezing, rhonchi or rales.   Abdominal:      General: Abdomen is flat. Bowel sounds are normal. There is no distension.      Palpations: Abdomen is soft. There is no mass.      Tenderness: There is no abdominal tenderness. There is no guarding or rebound.      Hernia: No hernia is present.   Musculoskeletal:      Cervical back: Normal range of motion and neck supple.      Right lower leg: No edema.      Left lower leg: No edema.   Neurological:      Mental Status: He is alert and oriented to person, place, and time.       Assessment:       Problem List Items Addressed This Visit    None  Visit Diagnoses       Hyperlipidemia, unspecified hyperlipidemia type    -  Primary    Relevant Orders    CBC Auto Differential    Comprehensive Metabolic Panel    Lipid Panel    Anxiety        Relevant Medications    LORazepam (ATIVAN) 0.5 MG tablet    Chronic low back pain without sciatica, unspecified back pain laterality        Relevant Medications    naloxone (NARCAN) 4 mg/actuation Spry    traMADoL (ULTRAM) 50 mg tablet              Plan:       Slime was seen today for follow up/ back pain.    Diagnoses and all orders for this visit:    Hyperlipidemia, unspecified hyperlipidemia type  -     CBC Auto Differential; Future  -     Comprehensive Metabolic Panel; Future  -     Lipid Panel; Future  Due for labs    Anxiety  -     LORazepam (ATIVAN) 0.5 MG tablet; Take 1 tablet (0.5 mg total) by mouth nightly as needed for Anxiety.  Stable. Refilled meds.    Chronic low back pain without sciatica, unspecified back pain laterality  -      naloxone (NARCAN) 4 mg/actuation Spry; 4mg by nasal route as needed for opioid overdose; may repeat every 2-3 minutes in alternating nostrils until medical help arrives. Call 911  -     traMADoL (ULTRAM) 50 mg tablet; take 1 tablet by mouth every 6 hours

## 2022-12-13 NOTE — PROGRESS NOTES
Answers submitted by the patient for this visit:  Review of Systems Questionnaire (Submitted on 12/13/2022)  activity change: No  unexpected weight change: No  neck pain: No  hearing loss: No  rhinorrhea: No  trouble swallowing: No  eye discharge: No  visual disturbance: No  chest tightness: No  wheezing: No  chest pain: No  palpitations: No  blood in stool: No  constipation: No  vomiting: No  diarrhea: No  polydipsia: No  polyuria: No  difficulty urinating: No  urgency: No  hematuria: No  joint swelling: No  arthralgias: No  headaches: Yes  weakness: No  confusion: No  dysphoric mood: No

## 2023-02-27 ENCOUNTER — OFFICE VISIT (OUTPATIENT)
Dept: URGENT CARE | Facility: CLINIC | Age: 43
End: 2023-02-27
Payer: COMMERCIAL

## 2023-02-27 VITALS
WEIGHT: 225 LBS | TEMPERATURE: 98 F | HEIGHT: 74 IN | RESPIRATION RATE: 18 BRPM | OXYGEN SATURATION: 96 % | SYSTOLIC BLOOD PRESSURE: 130 MMHG | BODY MASS INDEX: 28.88 KG/M2 | HEART RATE: 70 BPM | DIASTOLIC BLOOD PRESSURE: 83 MMHG

## 2023-02-27 DIAGNOSIS — K60.2 ANAL FISSURE: Primary | ICD-10-CM

## 2023-02-27 PROCEDURE — 1160F PR REVIEW ALL MEDS BY PRESCRIBER/CLIN PHARMACIST DOCUMENTED: ICD-10-PCS | Mod: CPTII,S$GLB,,

## 2023-02-27 PROCEDURE — 1159F MED LIST DOCD IN RCRD: CPT | Mod: CPTII,S$GLB,,

## 2023-02-27 PROCEDURE — 99213 OFFICE O/P EST LOW 20 MIN: CPT | Mod: S$GLB,,,

## 2023-02-27 PROCEDURE — 1159F PR MEDICATION LIST DOCUMENTED IN MEDICAL RECORD: ICD-10-PCS | Mod: CPTII,S$GLB,,

## 2023-02-27 PROCEDURE — 3079F PR MOST RECENT DIASTOLIC BLOOD PRESSURE 80-89 MM HG: ICD-10-PCS | Mod: CPTII,S$GLB,,

## 2023-02-27 PROCEDURE — 99213 PR OFFICE/OUTPT VISIT, EST, LEVL III, 20-29 MIN: ICD-10-PCS | Mod: S$GLB,,,

## 2023-02-27 PROCEDURE — 3008F BODY MASS INDEX DOCD: CPT | Mod: CPTII,S$GLB,,

## 2023-02-27 PROCEDURE — 3075F PR MOST RECENT SYSTOLIC BLOOD PRESS GE 130-139MM HG: ICD-10-PCS | Mod: CPTII,S$GLB,,

## 2023-02-27 PROCEDURE — 3008F PR BODY MASS INDEX (BMI) DOCUMENTED: ICD-10-PCS | Mod: CPTII,S$GLB,,

## 2023-02-27 PROCEDURE — 3079F DIAST BP 80-89 MM HG: CPT | Mod: CPTII,S$GLB,,

## 2023-02-27 PROCEDURE — 1160F RVW MEDS BY RX/DR IN RCRD: CPT | Mod: CPTII,S$GLB,,

## 2023-02-27 PROCEDURE — 3075F SYST BP GE 130 - 139MM HG: CPT | Mod: CPTII,S$GLB,,

## 2023-02-27 RX ORDER — DOCUSATE SODIUM 100 MG/1
100 CAPSULE, LIQUID FILLED ORAL 2 TIMES DAILY
Qty: 20 CAPSULE | Refills: 0 | Status: SHIPPED | OUTPATIENT
Start: 2023-02-27 | End: 2023-02-27

## 2023-02-27 RX ORDER — DOCUSATE SODIUM 100 MG/1
100 CAPSULE, LIQUID FILLED ORAL 2 TIMES DAILY
Qty: 30 CAPSULE | Refills: 0 | Status: SHIPPED | OUTPATIENT
Start: 2023-02-27 | End: 2023-03-14

## 2023-02-28 NOTE — PATIENT INSTRUCTIONS
Apply ointment 2-3 times daily to rectum. Take Colace stool softener two times daily.    What changes to diet are needed?   Drink 6 to 8 glasses of fluids each day.  Eat lots of high-fiber foods. For example, fruits, vegetables, beans, and whole grains are good choices.  What can be done to prevent this health problem?   Do not strain, bear down, or hold your breath during a bowel movement.  Move your bowels as soon as you feel the urge.  Wipe the anal area with soft materials or a wet cloth.  Avoid anal sex.  Use stool softeners as told by your doctor.  Drink 6 to 8 glasses of water a day.  When do I need to call the doctor?   You feel very bad pain in your anus  You have spasms or bleeding in your anus that does not stop  The fissure is more painful  The fissure is not getting better after about 3 weeks of care  Bleeding from the anus is more or blood is seen mixed in the stool  - Rest.    - Drink plenty of fluids.    - Acetaminophen (tylenol) or Ibuprofen (advil,motrin) as directed as needed for fever/pain. Avoid tylenol if you have a history of liver disease. Do not take ibuprofen if you have a history of GI bleeding, kidney disease, or if you take blood thinners.     - Follow up with your PCP or specialty clinic as directed in the next 1-2 weeks if not improved or as needed.  You can call [phone] to schedule an appointment with the appropriate provider.    - Go to the ER or seek medical attention immediately if you develop new or worsening symptoms.     - You must understand that you have received an Urgent Care treatment only and that you may be released before all of your medical problems are known or treated.   - You, the patient, will arrange for follow up care as instructed.   - If your condition worsens or fails to improve we recommend that you receive another evaluation at the ER immediately or contact your PCP to discuss your concerns or return here.

## 2023-02-28 NOTE — PROGRESS NOTES
"Subjective:       Patient ID: Slime Bradshaw is a 42 y.o. male.    Vitals:  height is 6' 2" (1.88 m) and weight is 102.1 kg (225 lb). His temperature is 98.4 °F (36.9 °C). His blood pressure is 130/83 and his pulse is 70. His respiration is 18 and oxygen saturation is 96%.     Chief Complaint: Rectal Bleeding    41 y/o male patient c/o rectal pain, itching, and bleeding for the past month. Patient reports he noticed some pink/bright red blood when he wipes after a bowel movement and noticed a smear of blood today on his underwear. Patient denies any recent trauma, injury to rectum. Denies any abdominal pain, tenderness, nausea, or vomiting. Patient denies any frequent use of nsaids or aspirins. Fecal occult blood test is negative in clinic. Upon examination, patient has a small tear to rectum that is painful and tender to touch. No hemorrhoids present.     Rectal Bleeding  This is a new problem. The current episode started more than 1 month ago. The problem occurs intermittently. The problem has been unchanged. Pertinent negatives include no abdominal pain, anorexia, arthralgias, change in bowel habit, chest pain, chills, congestion, coughing, diaphoresis, fatigue, fever, headaches, joint swelling, myalgias, nausea, neck pain, numbness, rash, sore throat, swollen glands, urinary symptoms, vertigo, visual change, vomiting or weakness. Exacerbated by: nika lmovement. Treatments tried: hemorrhoid medication.     Constitution: Negative for chills, sweating, fatigue and fever.   HENT:  Negative for ear pain, congestion and sore throat.    Neck: Negative for neck pain.   Cardiovascular:  Negative for chest pain.   Eyes:  Negative for eye trauma.   Respiratory:  Negative for cough.    Gastrointestinal:  Positive for bright red blood in stool and rectal pain. Negative for abdominal pain, nausea and vomiting.   Endocrine: hair loss.   Genitourinary:  Negative for dysuria.   Musculoskeletal:  Negative for joint pain, " joint swelling and muscle ache.   Skin:  Negative for rash and laceration.   Allergic/Immunologic: Negative for environmental allergies.   Neurological:  Negative for history of vertigo, headaches, altered mental status and numbness.   Psychiatric/Behavioral:  Negative for altered mental status.      Objective:      Physical Exam   Constitutional: He is oriented to person, place, and time. He appears well-developed.   HENT:   Head: Normocephalic and atraumatic.   Ears:   Right Ear: External ear normal.   Left Ear: External ear normal.   Nose: Nose normal.   Mouth/Throat: Mucous membranes are normal.   Eyes: Conjunctivae and lids are normal.   Neck: Trachea normal. Neck supple.   Cardiovascular: Normal rate, regular rhythm and normal heart sounds.   Pulmonary/Chest: Effort normal and breath sounds normal. No respiratory distress.   Abdominal: Normal appearance and bowel sounds are normal. He exhibits no distension and no mass. Soft. There is no abdominal tenderness.   Genitourinary: Rectum:      Anal fissure present.     Musculoskeletal: Normal range of motion.         General: Normal range of motion.   Neurological: He is alert and oriented to person, place, and time. He has normal strength.   Skin: Skin is warm, dry, intact, not diaphoretic and not pale.   Psychiatric: His speech is normal and behavior is normal. Judgment and thought content normal.   Nursing note and vitals reviewed.      Assessment:       1. Anal fissure          Plan:       Patient Instructions   Apply ointment 2-3 times daily to rectum. Take Colace stool softener two times daily.    What changes to diet are needed?   Drink 6 to 8 glasses of fluids each day.  Eat lots of high-fiber foods. For example, fruits, vegetables, beans, and whole grains are good choices.  What can be done to prevent this health problem?   Do not strain, bear down, or hold your breath during a bowel movement.  Move your bowels as soon as you feel the urge.  Wipe the anal  area with soft materials or a wet cloth.  Avoid anal sex.  Use stool softeners as told by your doctor.  Drink 6 to 8 glasses of water a day.  When do I need to call the doctor?   You feel very bad pain in your anus  You have spasms or bleeding in your anus that does not stop  The fissure is more painful  The fissure is not getting better after about 3 weeks of care  Bleeding from the anus is more or blood is seen mixed in the stool  - Rest.    - Drink plenty of fluids.    - Acetaminophen (tylenol) or Ibuprofen (advil,motrin) as directed as needed for fever/pain. Avoid tylenol if you have a history of liver disease. Do not take ibuprofen if you have a history of GI bleeding, kidney disease, or if you take blood thinners.     - Follow up with your PCP or specialty clinic as directed in the next 1-2 weeks if not improved or as needed.  You can call [phone] to schedule an appointment with the appropriate provider.    - Go to the ER or seek medical attention immediately if you develop new or worsening symptoms.     - You must understand that you have received an Urgent Care treatment only and that you may be released before all of your medical problems are known or treated.   - You, the patient, will arrange for follow up care as instructed.   - If your condition worsens or fails to improve we recommend that you receive another evaluation at the ER immediately or contact your PCP to discuss your concerns or return here.      Anal fissure  -     nitroglycerin (NITROGLYN) 0.2 % ointment; Apply to anorectal area 2-3 times a day  Dispense: 30 g; Refill: 0  -     Discontinue: docusate sodium (COLACE) 100 MG capsule; Take 1 capsule (100 mg total) by mouth 2 (two) times daily. for 10 days  Dispense: 20 capsule; Refill: 0  -     Discontinue: docusate sodium (COLACE) 100 MG capsule; Take 1 capsule (100 mg total) by mouth 2 (two) times daily. for 10 days  Dispense: 20 capsule; Refill: 0  -     docusate sodium (COLACE) 100  MG capsule; Take 1 capsule (100 mg total) by mouth 2 (two) times daily. for 15 days  Dispense: 30 capsule; Refill: 0

## 2023-03-31 ENCOUNTER — PATIENT MESSAGE (OUTPATIENT)
Dept: FAMILY MEDICINE | Facility: CLINIC | Age: 43
End: 2023-03-31

## 2023-05-18 ENCOUNTER — OFFICE VISIT (OUTPATIENT)
Dept: URGENT CARE | Facility: CLINIC | Age: 43
End: 2023-05-18
Payer: COMMERCIAL

## 2023-05-18 VITALS
RESPIRATION RATE: 16 BRPM | WEIGHT: 230 LBS | BODY MASS INDEX: 29.52 KG/M2 | SYSTOLIC BLOOD PRESSURE: 145 MMHG | DIASTOLIC BLOOD PRESSURE: 85 MMHG | HEIGHT: 74 IN | OXYGEN SATURATION: 99 % | TEMPERATURE: 98 F | HEART RATE: 73 BPM

## 2023-05-18 DIAGNOSIS — Z20.828 HERPES EXPOSURE: ICD-10-CM

## 2023-05-18 DIAGNOSIS — Z20.2 POSSIBLE EXPOSURE TO STD: Primary | ICD-10-CM

## 2023-05-18 DIAGNOSIS — I10 HYPERTENSION, UNSPECIFIED TYPE: ICD-10-CM

## 2023-05-18 PROCEDURE — 86592 SYPHILIS TEST NON-TREP QUAL: CPT | Performed by: FAMILY MEDICINE

## 2023-05-18 PROCEDURE — 87389 HIV-1 AG W/HIV-1&-2 AB AG IA: CPT | Performed by: FAMILY MEDICINE

## 2023-05-18 PROCEDURE — 86695 HERPES SIMPLEX TYPE 1 TEST: CPT | Performed by: FAMILY MEDICINE

## 2023-05-18 PROCEDURE — 99214 OFFICE O/P EST MOD 30 MIN: CPT | Mod: S$GLB,,, | Performed by: FAMILY MEDICINE

## 2023-05-18 PROCEDURE — 80074 ACUTE HEPATITIS PANEL: CPT | Performed by: FAMILY MEDICINE

## 2023-05-18 PROCEDURE — 36415 COLL VENOUS BLD VENIPUNCTURE: CPT | Performed by: FAMILY MEDICINE

## 2023-05-18 PROCEDURE — 99214 PR OFFICE/OUTPT VISIT, EST, LEVL IV, 30-39 MIN: ICD-10-PCS | Mod: S$GLB,,, | Performed by: FAMILY MEDICINE

## 2023-05-18 PROCEDURE — 87591 N.GONORRHOEAE DNA AMP PROB: CPT | Performed by: FAMILY MEDICINE

## 2023-05-18 RX ORDER — DEXMETHYLPHENIDATE HYDROCHLORIDE 30 MG/1
1 CAPSULE, EXTENDED RELEASE ORAL EVERY MORNING
COMMUNITY
Start: 2023-04-18

## 2023-05-18 NOTE — PROGRESS NOTES
"Subjective:      Patient ID: Slime Bradshaw is a 42 y.o. male.    Vitals:  height is 6' 2" (1.88 m) and weight is 104.3 kg (230 lb). His temperature is 97.6 °F (36.4 °C). His blood pressure is 145/85 (abnormal) and his pulse is 73. His respiration is 16 and oxygen saturation is 99%.     Chief Complaint: Exposure to STD    Patient reports to the clinic with the compliant of possible prior herpes exposure. He reports no symptoms however, would like a full std panel due to the possibility of possible exposure.  Blood pressure is found to be mildly elevated.  Patient denies history of hypertension.    Exposure to STD  The patient's pertinent negatives include no genital injury, genital itching, genital lesions, pelvic pain, penile discharge, penile pain, priapism, scrotal swelling or testicular pain. This is a new problem. The current episode started today. The problem occurs constantly. The patient is experiencing no pain. Pertinent negatives include no abdominal pain, anorexia, chest pain, chills, constipation, coughing, diarrhea, discolored urine, dysuria, fever, flank pain, frequency, headaches, hematuria, hesitancy, joint pain, joint swelling, nausea, painful intercourse, rash, shortness of breath, sore throat, urgency, urinary retention or vomiting. Nothing aggravates the symptoms. He has tried nothing for the symptoms. He is sexually active. He inconsistently uses condoms. It is possible that his partner has an STD. There is no history of BPH, chlamydia, cryptorchidism, erectile aid use, erectile dysfunction, a femoral hernia, gonorrhea, herpes simplex, HIV, an inguinal hernia, kidney stones, prostatitis, sickle cell disease, syphilis or varicocele.     Constitution: Negative for chills and fever.   HENT:  Negative for sore throat.    Cardiovascular:  Negative for chest pain.   Respiratory:  Negative for cough and shortness of breath.    Gastrointestinal:  Negative for abdominal pain, nausea, vomiting, " constipation and diarrhea.   Genitourinary:  Negative for dysuria, frequency, urgency, flank pain, penile discharge, penile pain, scrotal swelling, testicular pain and pelvic pain.   Skin:  Negative for rash.   Neurological:  Negative for headaches.    Objective:     Physical Exam   Constitutional: He is oriented to person, place, and time. He appears well-developed. He is cooperative.   HENT:   Head: Normocephalic and atraumatic.   Ears:   Right Ear: Hearing, tympanic membrane, external ear and ear canal normal. impacted cerumen  Left Ear: Hearing, tympanic membrane, external ear and ear canal normal. impacted cerumen  Nose: Nose normal. No mucosal edema, rhinorrhea, nasal deformity or congestion. No epistaxis. Right sinus exhibits no maxillary sinus tenderness and no frontal sinus tenderness. Left sinus exhibits no maxillary sinus tenderness and no frontal sinus tenderness.   Mouth/Throat: Uvula is midline, oropharynx is clear and moist and mucous membranes are normal. No trismus in the jaw. Normal dentition. No uvula swelling. No oropharyngeal exudate or posterior oropharyngeal erythema.   Eyes: Conjunctivae and lids are normal.   Neck: Trachea normal and phonation normal. Neck supple.   Cardiovascular: Normal rate, regular rhythm, normal heart sounds and normal pulses.   No murmur heard.  Pulmonary/Chest: Effort normal and breath sounds normal. No stridor. No respiratory distress. He has no wheezes. He has no rhonchi. He has no rales.   Abdominal: Normal appearance and bowel sounds are normal. He exhibits no distension. Soft. There is no abdominal tenderness. There is no left CVA tenderness and no right CVA tenderness.   Musculoskeletal: Normal range of motion.         General: Normal range of motion.      Cervical back: He exhibits no tenderness.   Lymphadenopathy:     He has no cervical adenopathy.   Neurological: He is alert, oriented to person, place, and time and at baseline. He exhibits normal muscle tone.    Skin: Skin is warm, dry, intact and rash.   Psychiatric: His speech is normal and behavior is normal. Mood, judgment and thought content normal.   Nursing note and vitals reviewed.    Assessment:     1. Possible exposure to STD    2. Herpes exposure    3. Hypertension, unspecified type        Plan:   Discussed results/diagnosis/plan with patient in clinic.  Safe sex and hypertension precautions advised.  Advised to f/u with PCP within 2-5 days. ER precautions given if symptoms get any worse. All questions answered. Patient verbally understood and agreed with treatment plan.     Possible exposure to STD  -     HERPES SIMPLEX 1&2 IGG  -     HIV 1/2 Ag/Ab (4th Gen)  -     RPR  -     HEPATITIS PANEL, ACUTE  -     C. trachomatis/N. gonorrhoeae by AMP DNA    Herpes exposure    Hypertension, unspecified type

## 2023-05-19 LAB
HAV IGM SERPL QL IA: NORMAL
HBV CORE IGM SERPL QL IA: NORMAL
HBV SURFACE AG SERPL QL IA: NORMAL
HCV AB SERPL QL IA: NORMAL
HIV 1+2 AB+HIV1 P24 AG SERPL QL IA: NORMAL

## 2023-05-20 LAB — RPR SER QL: NORMAL

## 2023-05-22 LAB
C TRACH DNA SPEC QL NAA+PROBE: NOT DETECTED
HSV1 IGG SERPL QL IA: NEGATIVE
HSV2 IGG SERPL QL IA: POSITIVE
N GONORRHOEA DNA SPEC QL NAA+PROBE: NOT DETECTED

## 2023-05-23 ENCOUNTER — TELEPHONE (OUTPATIENT)
Dept: URGENT CARE | Facility: CLINIC | Age: 43
End: 2023-05-23
Payer: COMMERCIAL

## 2023-05-23 NOTE — TELEPHONE ENCOUNTER
Called For follow-up and positive HSV IgG  Patient is asymptomatic  Advised to follow-up with PCP. Answered all questions.

## 2023-06-12 ENCOUNTER — OFFICE VISIT (OUTPATIENT)
Dept: URGENT CARE | Facility: CLINIC | Age: 43
End: 2023-06-12
Payer: COMMERCIAL

## 2023-06-12 VITALS
SYSTOLIC BLOOD PRESSURE: 146 MMHG | DIASTOLIC BLOOD PRESSURE: 83 MMHG | RESPIRATION RATE: 20 BRPM | BODY MASS INDEX: 29.52 KG/M2 | WEIGHT: 230 LBS | HEIGHT: 74 IN | TEMPERATURE: 98 F | HEART RATE: 71 BPM | OXYGEN SATURATION: 98 %

## 2023-06-12 DIAGNOSIS — S49.91XA INJURY OF RIGHT SHOULDER, INITIAL ENCOUNTER: Primary | ICD-10-CM

## 2023-06-12 PROCEDURE — 99213 PR OFFICE/OUTPT VISIT, EST, LEVL III, 20-29 MIN: ICD-10-PCS | Mod: 25,S$GLB,, | Performed by: FAMILY MEDICINE

## 2023-06-12 PROCEDURE — 73030 X-RAY EXAM OF SHOULDER: CPT | Mod: RT,S$GLB,, | Performed by: RADIOLOGY

## 2023-06-12 PROCEDURE — 73030 XR SHOULDER COMPLETE 2 OR MORE VIEWS RIGHT: ICD-10-PCS | Mod: RT,S$GLB,, | Performed by: RADIOLOGY

## 2023-06-12 PROCEDURE — 99213 OFFICE O/P EST LOW 20 MIN: CPT | Mod: 25,S$GLB,, | Performed by: FAMILY MEDICINE

## 2023-06-12 PROCEDURE — 96372 THER/PROPH/DIAG INJ SC/IM: CPT | Mod: S$GLB,,, | Performed by: FAMILY MEDICINE

## 2023-06-12 PROCEDURE — 96372 PR INJECTION,THERAP/PROPH/DIAG2ST, IM OR SUBCUT: ICD-10-PCS | Mod: S$GLB,,, | Performed by: FAMILY MEDICINE

## 2023-06-12 RX ORDER — IBUPROFEN 800 MG/1
800 TABLET ORAL 3 TIMES DAILY PRN
Qty: 21 TABLET | Refills: 0 | Status: SHIPPED | OUTPATIENT
Start: 2023-06-12 | End: 2023-06-19

## 2023-06-12 RX ORDER — KETOROLAC TROMETHAMINE 30 MG/ML
30 INJECTION, SOLUTION INTRAMUSCULAR; INTRAVENOUS
Status: COMPLETED | OUTPATIENT
Start: 2023-06-12 | End: 2023-06-12

## 2023-06-12 RX ADMIN — KETOROLAC TROMETHAMINE 30 MG: 30 INJECTION, SOLUTION INTRAMUSCULAR; INTRAVENOUS at 06:06

## 2023-06-12 NOTE — PROGRESS NOTES
"Subjective:      Patient ID: Slimegerard Bradshaw is a 42 y.o. male.      Chief Complaint: Shoulder Injury    Shoulder Injury   The incident occurred at home. The right shoulder is affected. The incident occurred more than 1 week ago (2 weeks now). The injury mechanism was overhead work and repetitive motion. The quality of the pain is described as aching. The pain does not radiate. The pain is at a severity of 6/10. The pain is moderate. Pertinent negatives include no chest pain, muscle weakness, numbness or tingling. The symptoms are aggravated by movement. He has tried acetaminophen for the symptoms. The treatment provided mild relief.     Cardiovascular:  Negative for chest pain.   Neurological:  Negative for numbness.    Objective:     Vitals:    06/12/23 1843   BP: (!) 146/83   Pulse: 71   Resp: 20   Temp: 98.3 °F (36.8 °C)   SpO2: 98%   Weight: 104.3 kg (230 lb)   Height: 6' 2" (1.88 m)      Physical Exam   Pulmonary/Chest: Effort normal.   Musculoskeletal:         General: Tenderness (right shoulder) present. No swelling or deformity.      Comments: Unable to lift right arm over the right shoulder due to pain   Neurological: He is alert. No sensory deficit.   Psychiatric: Judgment and thought content normal.     XR SHOULDER COMPLETE 2 OR MORE VIEWS RIGHT    Result Date: 6/12/2023  EXAMINATION: XR SHOULDER COMPLETE 2 OR MORE VIEWS RIGHT CLINICAL HISTORY: Unspecified injury of right shoulder and upper arm, initial encounter TECHNIQUE: Two or three views of the right shoulder were performed. COMPARISON: 08/18/2014 FINDINGS: Four views right shoulder. The right humeral head maintains appropriate relationship with the glenoid.  The acromioclavicular joint is intact.  No acute displaced right rib fracture.  The right lung zones are clear.  There may be changes of calcific tendinosis.     1. No acute displaced fracture or dislocation of the right shoulder. Electronically signed by: Orion Borges MD " Date:    06/12/2023 Time:    19:49    Assessment:     1. Injury of right shoulder, initial encounter        Plan:       Injury of right shoulder, initial encounter  Comments:  concern for rotator cuff/ overuse  Orders:  -     XR SHOULDER COMPLETE 2 OR MORE VIEWS RIGHT; Future; Expected date: 06/12/2023  -     ketorolac injection 30 mg  -     ibuprofen (ADVIL,MOTRIN) 800 MG tablet; Take 1 tablet (800 mg total) by mouth 3 (three) times daily as needed for Pain. Take with meals  Dispense: 21 tablet; Refill: 0  -     Ambulatory referral/consult to Sports Medicine      Patient Instructions   I have placed a sports medicine referral  Call to schedule an appointment: [phone]

## 2023-06-15 ENCOUNTER — OFFICE VISIT (OUTPATIENT)
Dept: SPORTS MEDICINE | Facility: CLINIC | Age: 43
End: 2023-06-15
Payer: COMMERCIAL

## 2023-06-15 VITALS
HEIGHT: 74 IN | DIASTOLIC BLOOD PRESSURE: 74 MMHG | WEIGHT: 223 LBS | SYSTOLIC BLOOD PRESSURE: 124 MMHG | BODY MASS INDEX: 28.62 KG/M2 | HEART RATE: 69 BPM

## 2023-06-15 DIAGNOSIS — M25.511 ACUTE PAIN OF RIGHT SHOULDER: Primary | ICD-10-CM

## 2023-06-15 PROCEDURE — 3074F SYST BP LT 130 MM HG: CPT | Mod: CPTII,S$GLB,, | Performed by: PHYSICIAN ASSISTANT

## 2023-06-15 PROCEDURE — 3008F BODY MASS INDEX DOCD: CPT | Mod: CPTII,S$GLB,, | Performed by: PHYSICIAN ASSISTANT

## 2023-06-15 PROCEDURE — 3078F PR MOST RECENT DIASTOLIC BLOOD PRESSURE < 80 MM HG: ICD-10-PCS | Mod: CPTII,S$GLB,, | Performed by: PHYSICIAN ASSISTANT

## 2023-06-15 PROCEDURE — 1159F PR MEDICATION LIST DOCUMENTED IN MEDICAL RECORD: ICD-10-PCS | Mod: CPTII,S$GLB,, | Performed by: PHYSICIAN ASSISTANT

## 2023-06-15 PROCEDURE — 1159F MED LIST DOCD IN RCRD: CPT | Mod: CPTII,S$GLB,, | Performed by: PHYSICIAN ASSISTANT

## 2023-06-15 PROCEDURE — 3008F PR BODY MASS INDEX (BMI) DOCUMENTED: ICD-10-PCS | Mod: CPTII,S$GLB,, | Performed by: PHYSICIAN ASSISTANT

## 2023-06-15 PROCEDURE — 99999 PR PBB SHADOW E&M-EST. PATIENT-LVL III: CPT | Mod: PBBFAC,,, | Performed by: PHYSICIAN ASSISTANT

## 2023-06-15 PROCEDURE — 99204 PR OFFICE/OUTPT VISIT, NEW, LEVL IV, 45-59 MIN: ICD-10-PCS | Mod: S$GLB,,, | Performed by: PHYSICIAN ASSISTANT

## 2023-06-15 PROCEDURE — 3074F PR MOST RECENT SYSTOLIC BLOOD PRESSURE < 130 MM HG: ICD-10-PCS | Mod: CPTII,S$GLB,, | Performed by: PHYSICIAN ASSISTANT

## 2023-06-15 PROCEDURE — 3078F DIAST BP <80 MM HG: CPT | Mod: CPTII,S$GLB,, | Performed by: PHYSICIAN ASSISTANT

## 2023-06-15 PROCEDURE — 99204 OFFICE O/P NEW MOD 45 MIN: CPT | Mod: S$GLB,,, | Performed by: PHYSICIAN ASSISTANT

## 2023-06-15 PROCEDURE — 99999 PR PBB SHADOW E&M-EST. PATIENT-LVL III: ICD-10-PCS | Mod: PBBFAC,,, | Performed by: PHYSICIAN ASSISTANT

## 2023-06-15 NOTE — PROGRESS NOTES
CC: RIGHT shoulder pain    Patient is a 42-year-old male who presents today for initial evaluation of right shoulder pain.  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  Patient reports that he initially began experiencing pain in the right shoulder approximately 2-3 weeks ago after lifting and playing with his daughter.  Since then, he has had continued pain of the shoulder despite adequate rest and conservative treatment at home.  He localizes the pain to the anterior and posterior aspects of the right shoulder without radiation.  This is worse with overhead activity, lifting, and is disruptive to sleep.  Patient reports associated weakness of the right shoulder as well.  Thus far treatment has included rest, ice compresses, and oral anti-inflammatories without relief.  He was initially evaluated for this injury in urgent care where he had x-rays which were negative for any acute fracture or dislocation, and advised to follow up with Orthopedics.    Is affecting ADLs.  Pain is 6/10 at it's worst.      Past Medical History:   Diagnosis Date    Anxiety        Past Surgical History:   Procedure Laterality Date    ANTERIOR CRUCIATE LIGAMENT REPAIR Right     KNEE ARTHROSCOPY W/ ACL RECONSTRUCTION      right    WISDOM TOOTH EXTRACTION         Family History   Problem Relation Age of Onset    Cancer Father         throat-- smoker    Hypertension Mother     Diabetes Mother     Hypertension Brother     Lupus Sister     No Known Problems Daughter     No Known Problems Son     COPD Maternal Grandmother     Eczema Daughter     Asthma Son     No Known Problems Son     Prostate cancer Neg Hx          Current Outpatient Medications:     albuterol (VENTOLIN HFA) 90 mcg/actuation inhaler, Inhale 2 puffs into the lungs every 6 (six) hours as needed for Wheezing. Rescue, Disp: 18 g, Rfl: 5    ibuprofen (ADVIL,MOTRIN) 800 MG tablet, Take 1 tablet (800 mg total) by mouth 3 (three) times daily as needed for  Pain. Take with meals, Disp: 21 tablet, Rfl: 0    lisdexamfetamine (VYVANSE) 30 MG capsule, , Disp: , Rfl:     dexmethylphenidate (FOCALIN XR) 30 mg 24 hr capsule, Take 1 capsule by mouth every morning., Disp: , Rfl:     fluticasone-salmeterol diskus inhaler 100-50 mcg, Inhale 1 puff into the lungs 2 (two) times daily. Controller (Patient not taking: Reported on 2/27/2023), Disp: 60 each, Rfl: 11    LORazepam (ATIVAN) 0.5 MG tablet, Take 1 tablet (0.5 mg total) by mouth nightly as needed for Anxiety., Disp: 30 tablet, Rfl: 2    naloxone (NARCAN) 4 mg/actuation Spry, 4mg by nasal route as needed for opioid overdose; may repeat every 2-3 minutes in alternating nostrils until medical help arrives. Call 911 (Patient not taking: Reported on 6/15/2023), Disp: 1 each, Rfl: 11    nitroglycerin (NITROGLYN) 0.2 % ointment, Apply to anorectal area 2-3 times a day (Patient not taking: Reported on 6/15/2023), Disp: 30 g, Rfl: 0    traMADoL (ULTRAM) 50 mg tablet, take 1 tablet by mouth every 6 hours (Patient not taking: Reported on 2/27/2023), Disp: 45 tablet, Rfl: 3    Review of patient's allergies indicates:   Allergen Reactions    Hydrocodone Itching    Iodine      Other reaction(s): Seafood - swelling, Itch    Statins-hmg-coa reductase inhibitors      Kidney injury          REVIEW OF SYSTEMS:  Constitution: Negative. Negative for chills, fever and night sweats.   HENT: Negative for congestion and headaches.    Eyes: Negative for blurred vision, left vision loss and right vision loss.   Cardiovascular: Negative for chest pain and syncope.   Respiratory: Negative for cough and shortness of breath.    Endocrine: Negative for polydipsia, polyphagia and polyuria.   Hematologic/Lymphatic: Negative for bleeding problem. Does not bruise/bleed easily.   Skin: Negative for dry skin, itching and rash.   Musculoskeletal: Negative for falls.  Positive for right shoulder pain and muscle weakness.   Gastrointestinal: Negative for abdominal  "pain and bowel incontinence.   Genitourinary: Negative for bladder incontinence and nocturia.   Neurological: Negative for disturbances in coordination, loss of balance and seizures.   Psychiatric/Behavioral: Negative for depression. The patient does not have insomnia.    Allergic/Immunologic: Negative for hives and persistent infections.      PHYSICAL EXAMINATION:  Vitals:  /74   Pulse 69   Ht 6' 2" (1.88 m)   Wt 101.2 kg (223 lb)   BMI 28.63 kg/m²    General: The patient is alert and oriented x 3.  Mood is pleasant.  Observation of ears, eyes and nose reveal no gross abnormalities.  No labored breathing observed.  Gait is coordinated. Patient can toe walk and heel walk without difficulty.      RIGHT SHOULDER / UPPER EXTREMITY EXAM    OBSERVATION:     Swelling  none  Deformity  none   Discoloration  none   Scapular winging none   Scars   none  Atrophy  none    TENDERNESS / CREPITUS (T/C):          T/C      T/C   Clavicle   -/-  SUPRAspinatus    -/-     AC Jt.    -/-  INFRAspinatus  -/-    SC Jt.    -/-  Deltoid    -/-      G. Tuberosity  -/-  LH BICEP groove  -/-   Acromion:  -/-  Midline Neck   -/-     Scapular Spine -/-  Trapezium   -/-   SMA Scapula  -/-  GH jt. line - post  -/-     Scapulothoracic  -/-         ROM: (* = with pain)  Left shoulder   Right shoulder        AROM (PROM)   AROM (PROM)   FE    170° (175°)     160°* (165°*)     ER at 0°    60°  (65°)    60°*  (60°*)   ER at 90° ABD  90°  (90°)    80°*  (80°*)   IR at 90°  ABD   NA  (40°)     60*  (60°)      IR (spine level)   T10     L2*    STRENGTH: (* = with pain) Left shoulder   Right shoulder   SCAPTION   5/5    4/5*    IR    5/5    4/5*   ER    5/5    5/5   BICEPS   5/5    5/5*   Deltoid    5/5    5/5     SIGNS:  Painful side       NEER   -    ORACHELS  +    MOORE   +    SPEEDS  +     DROP ARM   -   BELLY PRESS neg   Superior escape none    LIFT-OFF  +   X-Body ADD    neg    MOVING VALGUS neg        STABILITY TESTING    Left " shoulder   Right shoulder    Translation     Anterior  up face    up face    Posterior  up face    up face    Sulcus   < 10mm   < 10 mm     Signs   Apprehension   neg      neg       Relocation   no change     no change      Jerk test  neg     neg    EXTREMITY NEURO-VASCULAR EXAM:    Sensation grossly intact to light touch all dermatomal regions.    DTR 2+ Biceps, Triceps, BR and Negative Elianas sign   Grossly intact motor function at Elbow, Wrist and Hand   Distal pulses radial and ulnar 2+, brisk cap refill, symmetric.      NECK:  Painless FROM and spinous processes non-tender. Negative Spurlings sign.      OTHER FINDINGS:  - scapular dyskinesia    XRAYS right shoulder (6/12/2023):  FINDINGS:  Four views right shoulder.     The right humeral head maintains appropriate relationship with the glenoid.  The acromioclavicular joint is intact.  No acute displaced right rib fracture.  The right lung zones are clear.  There may be changes of calcific tendinosis.          ASSESSMENT:     Right shoulder pain, possible rotator cuff tear      PLAN:      Prior imaging reviewed and discussed with patient in detail.    Orders placed for MRI without contrast of the right shoulder to evaluate for possible rotator cuff tear and biceps tendon injury.    Advised patient to continue to rest and ice the right shoulder and take over-the-counter anti-inflammatories/acetaminophen as needed for pain.  Also advised against heavy lifting and overhead activity while awaiting further imaging.    Follow-up in clinic with MRI results.      All questions were answered, patient will contact us for questions or concerns in the interim.      Medical Dictation software was used during the dictation of portions or the entirety of this medical record.  Phonetic or grammatic errors may exist due to the use of this software. For clarification, refer to the author of the document.

## 2023-06-19 ENCOUNTER — HOSPITAL ENCOUNTER (OUTPATIENT)
Dept: RADIOLOGY | Facility: OTHER | Age: 43
Discharge: HOME | End: 2023-06-19
Attending: PHYSICIAN ASSISTANT
Payer: COMMERCIAL

## 2023-06-19 DIAGNOSIS — M25.511 ACUTE PAIN OF RIGHT SHOULDER: ICD-10-CM

## 2023-06-19 PROCEDURE — 73221 MRI JOINT UPR EXTREM W/O DYE: CPT | Mod: TC,RT

## 2023-06-19 PROCEDURE — 73221 MRI SHOULDER WITHOUT CONTRAST RIGHT: ICD-10-PCS | Mod: 26,RT,, | Performed by: RADIOLOGY

## 2023-06-19 PROCEDURE — 73221 MRI JOINT UPR EXTREM W/O DYE: CPT | Mod: 26,RT,, | Performed by: RADIOLOGY

## 2023-06-22 ENCOUNTER — TELEPHONE (OUTPATIENT)
Dept: SPORTS MEDICINE | Facility: CLINIC | Age: 43
End: 2023-06-22
Payer: COMMERCIAL

## 2023-06-26 ENCOUNTER — OFFICE VISIT (OUTPATIENT)
Dept: SPORTS MEDICINE | Facility: CLINIC | Age: 43
End: 2023-06-26
Payer: COMMERCIAL

## 2023-06-26 VITALS
SYSTOLIC BLOOD PRESSURE: 127 MMHG | HEART RATE: 74 BPM | WEIGHT: 225.06 LBS | DIASTOLIC BLOOD PRESSURE: 84 MMHG | BODY MASS INDEX: 28.9 KG/M2

## 2023-06-26 DIAGNOSIS — M67.921 BICEPS TENDINOPATHY, RIGHT: ICD-10-CM

## 2023-06-26 DIAGNOSIS — M25.511 ACUTE PAIN OF RIGHT SHOULDER: Primary | ICD-10-CM

## 2023-06-26 DIAGNOSIS — M75.111 NONTRAUMATIC INCOMPLETE TEAR OF RIGHT ROTATOR CUFF: ICD-10-CM

## 2023-06-26 PROCEDURE — 20610 LARGE JOINT ASPIRATION/INJECTION: R SUBACROMIAL BURSA: ICD-10-PCS | Mod: RT,S$GLB,, | Performed by: PHYSICIAN ASSISTANT

## 2023-06-26 PROCEDURE — 3079F PR MOST RECENT DIASTOLIC BLOOD PRESSURE 80-89 MM HG: ICD-10-PCS | Mod: CPTII,S$GLB,, | Performed by: PHYSICIAN ASSISTANT

## 2023-06-26 PROCEDURE — 99214 OFFICE O/P EST MOD 30 MIN: CPT | Mod: 25,S$GLB,, | Performed by: PHYSICIAN ASSISTANT

## 2023-06-26 PROCEDURE — 3074F PR MOST RECENT SYSTOLIC BLOOD PRESSURE < 130 MM HG: ICD-10-PCS | Mod: CPTII,S$GLB,, | Performed by: PHYSICIAN ASSISTANT

## 2023-06-26 PROCEDURE — 20610 DRAIN/INJ JOINT/BURSA W/O US: CPT | Mod: RT,S$GLB,, | Performed by: PHYSICIAN ASSISTANT

## 2023-06-26 PROCEDURE — 99999 PR PBB SHADOW E&M-EST. PATIENT-LVL III: CPT | Mod: PBBFAC,,, | Performed by: PHYSICIAN ASSISTANT

## 2023-06-26 PROCEDURE — 3008F PR BODY MASS INDEX (BMI) DOCUMENTED: ICD-10-PCS | Mod: CPTII,S$GLB,, | Performed by: PHYSICIAN ASSISTANT

## 2023-06-26 PROCEDURE — 1159F MED LIST DOCD IN RCRD: CPT | Mod: CPTII,S$GLB,, | Performed by: PHYSICIAN ASSISTANT

## 2023-06-26 PROCEDURE — 3074F SYST BP LT 130 MM HG: CPT | Mod: CPTII,S$GLB,, | Performed by: PHYSICIAN ASSISTANT

## 2023-06-26 PROCEDURE — 99214 PR OFFICE/OUTPT VISIT, EST, LEVL IV, 30-39 MIN: ICD-10-PCS | Mod: 25,S$GLB,, | Performed by: PHYSICIAN ASSISTANT

## 2023-06-26 PROCEDURE — 99999 PR PBB SHADOW E&M-EST. PATIENT-LVL III: ICD-10-PCS | Mod: PBBFAC,,, | Performed by: PHYSICIAN ASSISTANT

## 2023-06-26 PROCEDURE — 1159F PR MEDICATION LIST DOCUMENTED IN MEDICAL RECORD: ICD-10-PCS | Mod: CPTII,S$GLB,, | Performed by: PHYSICIAN ASSISTANT

## 2023-06-26 PROCEDURE — 3079F DIAST BP 80-89 MM HG: CPT | Mod: CPTII,S$GLB,, | Performed by: PHYSICIAN ASSISTANT

## 2023-06-26 PROCEDURE — 3008F BODY MASS INDEX DOCD: CPT | Mod: CPTII,S$GLB,, | Performed by: PHYSICIAN ASSISTANT

## 2023-06-26 RX ORDER — TRIAMCINOLONE ACETONIDE 40 MG/ML
40 INJECTION, SUSPENSION INTRA-ARTICULAR; INTRAMUSCULAR
Status: DISCONTINUED | OUTPATIENT
Start: 2023-06-26 | End: 2023-06-26 | Stop reason: HOSPADM

## 2023-06-26 RX ORDER — IBUPROFEN 800 MG/1
800 TABLET ORAL 3 TIMES DAILY
COMMUNITY

## 2023-06-26 RX ADMIN — TRIAMCINOLONE ACETONIDE 40 MG: 40 INJECTION, SUSPENSION INTRA-ARTICULAR; INTRAMUSCULAR at 02:06

## 2023-06-26 NOTE — PROCEDURES
Large Joint Aspiration/Injection: R subacromial bursa    Date/Time: 6/26/2023 2:00 PM  Performed by: Marcellus Mancuso PA-C  Authorized by: Marcellus Mancuso PA-C     Consent Done?:  Yes (Verbal)  Indications:  Pain  Site marked: the procedure site was marked    Timeout: prior to procedure the correct patient, procedure, and site was verified    Prep: patient was prepped and draped in usual sterile fashion    Local anesthesia used?: No      Details:  Needle Size:  22 G  Ultrasonic Guidance for needle placement?: No    Approach:  Posterior  Location:  Shoulder  Site:  R subacromial bursa  Medications:  40 mg triamcinolone acetonide 40 mg/mL  Patient tolerance:  Patient tolerated the procedure well with no immediate complications    Injection Procedure  A time out was performed, including verification of patient ID, procedure, site and side, availability of information and equipment, review of safety issues, and agreement with consent, the procedure site was marked.    After time out was performed, the patient was prepped aseptically with povidone-iodine swabsticks. A diagnostic and therapeutic injection of 1:3cc Kenalog/Marcaine was given under sterile technique using a 22g x 1.5 needle from the Posterior  aspect of the right Subacromial in the sitting position.      Slime Bradshaw had no adverse reactions to the medication. Pain decreased. He was instructed to apply ice to the joint for 20 minutes and avoid strenuous activities for 24-36 hours following the injection. He was warned of possible blood sugar and/or blood pressure changes during that time. Following that time, he can resume regular activities.    He was reminded to call the clinic immediately for any adverse side effects as explained in clinic today.

## 2023-06-26 NOTE — PROGRESS NOTES
CC: RIGHT shoulder pain    Patient presents today for follow-up evaluation of right shoulder pain. No new injuries since his last visit. Continues to have pain with overhead lifting and reaching out to the side despite conservative treatment at home.  He is here today to discuss MRI results and treatment options moving forward.    HPI (6/15/2023):  Patient is a 42-year-old male who presents today for initial evaluation of right shoulder pain.  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  Patient reports that he initially began experiencing pain in the right shoulder approximately 2-3 weeks ago after lifting and playing with his daughter.  Since then, he has had continued pain of the shoulder despite adequate rest and conservative treatment at home.  He localizes the pain to the anterior and posterior aspects of the right shoulder without radiation.  This is worse with overhead activity, lifting, and is disruptive to sleep.  Patient reports associated weakness of the right shoulder as well.  Thus far treatment has included rest, ice compresses, and oral anti-inflammatories without relief.  He was initially evaluated for this injury in urgent care where he had x-rays which were negative for any acute fracture or dislocation, and advised to follow up with Orthopedics.    Is affecting ADLs.  Pain is 6/10 at it's worst.      Past Medical History:   Diagnosis Date    Anxiety        Past Surgical History:   Procedure Laterality Date    ANTERIOR CRUCIATE LIGAMENT REPAIR Right     KNEE ARTHROSCOPY W/ ACL RECONSTRUCTION      right    WISDOM TOOTH EXTRACTION         Family History   Problem Relation Age of Onset    Cancer Father         throat-- smoker    Hypertension Mother     Diabetes Mother     Hypertension Brother     Lupus Sister     No Known Problems Daughter     No Known Problems Son     COPD Maternal Grandmother     Eczema Daughter     Asthma Son     No Known Problems Son     Prostate  cancer Neg Hx          Current Outpatient Medications:     albuterol (VENTOLIN HFA) 90 mcg/actuation inhaler, Inhale 2 puffs into the lungs every 6 (six) hours as needed for Wheezing. Rescue, Disp: 18 g, Rfl: 5    dexmethylphenidate (FOCALIN XR) 30 mg 24 hr capsule, Take 1 capsule by mouth every morning., Disp: , Rfl:     fluticasone-salmeterol diskus inhaler 100-50 mcg, Inhale 1 puff into the lungs 2 (two) times daily. Controller (Patient not taking: Reported on 2/27/2023), Disp: 60 each, Rfl: 11    lisdexamfetamine (VYVANSE) 30 MG capsule, , Disp: , Rfl:     LORazepam (ATIVAN) 0.5 MG tablet, Take 1 tablet (0.5 mg total) by mouth nightly as needed for Anxiety., Disp: 30 tablet, Rfl: 2    naloxone (NARCAN) 4 mg/actuation Spry, 4mg by nasal route as needed for opioid overdose; may repeat every 2-3 minutes in alternating nostrils until medical help arrives. Call 911 (Patient not taking: Reported on 6/15/2023), Disp: 1 each, Rfl: 11    nitroglycerin (NITROGLYN) 0.2 % ointment, Apply to anorectal area 2-3 times a day (Patient not taking: Reported on 6/15/2023), Disp: 30 g, Rfl: 0    traMADoL (ULTRAM) 50 mg tablet, take 1 tablet by mouth every 6 hours (Patient not taking: Reported on 2/27/2023), Disp: 45 tablet, Rfl: 3    Review of patient's allergies indicates:   Allergen Reactions    Hydrocodone Itching    Iodine      Other reaction(s): Seafood - swelling, Itch    Statins-hmg-coa reductase inhibitors      Kidney injury          REVIEW OF SYSTEMS:  Constitution: Negative. Negative for chills, fever and night sweats.   HENT: Negative for congestion and headaches.    Eyes: Negative for blurred vision, left vision loss and right vision loss.   Cardiovascular: Negative for chest pain and syncope.   Respiratory: Negative for cough and shortness of breath.    Endocrine: Negative for polydipsia, polyphagia and polyuria.   Hematologic/Lymphatic: Negative for bleeding problem. Does not bruise/bleed easily.   Skin: Negative for  dry skin, itching and rash.   Musculoskeletal: Negative for falls.  Positive for right shoulder pain and muscle weakness.   Gastrointestinal: Negative for abdominal pain and bowel incontinence.   Genitourinary: Negative for bladder incontinence and nocturia.   Neurological: Negative for disturbances in coordination, loss of balance and seizures.   Psychiatric/Behavioral: Negative for depression. The patient does not have insomnia.    Allergic/Immunologic: Negative for hives and persistent infections.      PHYSICAL EXAMINATION:  Vitals:  There were no vitals taken for this visit.   General: The patient is alert and oriented x 3.  Mood is pleasant.  Observation of ears, eyes and nose reveal no gross abnormalities.  No labored breathing observed.  Gait is coordinated. Patient can toe walk and heel walk without difficulty.      RIGHT SHOULDER / UPPER EXTREMITY EXAM    OBSERVATION:     Swelling  none  Deformity  none   Discoloration  none   Scapular winging none   Scars   none  Atrophy  none    TENDERNESS / CREPITUS (T/C):          T/C      T/C   Clavicle   -/-  SUPRAspinatus    -/-     AC Jt.    -/-  INFRAspinatus  -/-    SC Jt.    -/-  Deltoid    -/-      G. Tuberosity  -/-  LH BICEP groove  -/-   Acromion:  -/-  Midline Neck   -/-     Scapular Spine -/-  Trapezium   -/-   SMA Scapula  -/-  GH jt. line - post  -/-     Scapulothoracic  -/-         ROM: (* = with pain)  Left shoulder   Right shoulder        AROM (PROM)   AROM (PROM)   FE    170° (175°)     160°* (165°*)     ER at 0°    60°  (65°)    60°*  (60°*)   ER at 90° ABD  90°  (90°)    80°*  (80°*)   IR at 90°  ABD   NA  (40°)     60*  (60°)      IR (spine level)   T10     L2*    STRENGTH: (* = with pain) Left shoulder   Right shoulder   SCAPTION   5/5    4/5*    IR    5/5    4/5*   ER    5/5    5/5   BICEPS   5/5    5/5*   Deltoid    5/5    5/5     SIGNS:  Painful side       NEER   -    ORACHELS  +    MOORE   +    SPEEDS  +     DROP ARM   -   BELLY  PRESS neg   Superior escape none    LIFT-OFF  +   X-Body ADD    neg    MOVING VALGUS neg        STABILITY TESTING    Left shoulder   Right shoulder    Translation     Anterior  up face    up face    Posterior  up face    up face    Sulcus   < 10mm   < 10 mm     Signs   Apprehension   neg      neg       Relocation   no change     no change      Jerk test  neg     neg    EXTREMITY NEURO-VASCULAR EXAM:    Sensation grossly intact to light touch all dermatomal regions.    DTR 2+ Biceps, Triceps, BR and Negative Elianas sign   Grossly intact motor function at Elbow, Wrist and Hand   Distal pulses radial and ulnar 2+, brisk cap refill, symmetric.      NECK:  Painless FROM and spinous processes non-tender. Negative Spurlings sign.      OTHER FINDINGS:  - scapular dyskinesia    XRAYS right shoulder (6/12/2023):  FINDINGS:  Four views right shoulder.     The right humeral head maintains appropriate relationship with the glenoid.  The acromioclavicular joint is intact.  No acute displaced right rib fracture.  The right lung zones are clear.  There may be changes of calcific tendinosis.    MRI right shoulder (6/19/2023):  Impression:     1. Tiny intrasubstance tear of insertional infraspinatus tendon.  Supraspinatus and subscapularis tendinosis.  Rotator cuff muscle bulk is maintained.  2. Long head biceps tendinosis and tenosynovitis.  3. Questionable irregularity of the superior labrum, suboptimally characterized without intra-articular contrast.      ASSESSMENT:     Right shoulder pain  Rotator cuff tendinosis with small intrasubstance tear, right  Biceps tendinopathy, right      PLAN:      I made the decision to obtain old records of the patient including previous notes and imaging. I independently reviewed and interpreted the radiographs and/or MRIs today as well as prior imaging. Reviewed MRI and radiograph results with patient in detail.    We discussed at length different treatment options including conservative vs  surgical management. These include anti-inflammatories, acetaminophen, rest, ice, heat, formal physical therapy including strengthening and stretching exercises, home exercise programs, dry needling, corticosteroid injections, and finally surgical intervention.      Right shoulder subacromial corticosteroid injection performed today.  See procedure note for details.    Prescription for Meloxicam 15 mg one p.o. QD  p.r.n. pain provided today.  Advised patient to refrain from taking other anti-inflammatory medications while taking Meloxicam, however may alternate with acetaminophen as needed.    Patient provided with a rotator cuff/periscapular home exercise program.    Advised patient to continue to rest and ice the right shoulder and take over-the-counter anti-inflammatories/acetaminophen as needed for pain.      Follow up in 2 months      All questions were answered, patient will contact us for questions or concerns in the interim.      Medical Dictation software was used during the dictation of portions or the entirety of this medical record.  Phonetic or grammatic errors may exist due to the use of this software. For clarification, refer to the author of the document.

## 2023-09-29 ENCOUNTER — CLINICAL SUPPORT (OUTPATIENT)
Dept: REHABILITATION | Facility: HOSPITAL | Age: 43
End: 2023-09-29
Attending: PHYSICIAN ASSISTANT
Payer: MEDICAID

## 2023-09-29 ENCOUNTER — OFFICE VISIT (OUTPATIENT)
Dept: SPORTS MEDICINE | Facility: CLINIC | Age: 43
End: 2023-09-29
Payer: COMMERCIAL

## 2023-09-29 VITALS
SYSTOLIC BLOOD PRESSURE: 131 MMHG | HEART RATE: 65 BPM | BODY MASS INDEX: 29.99 KG/M2 | DIASTOLIC BLOOD PRESSURE: 72 MMHG | WEIGHT: 233.69 LBS | HEIGHT: 74 IN

## 2023-09-29 DIAGNOSIS — M67.921 BICEPS TENDINOPATHY, RIGHT: ICD-10-CM

## 2023-09-29 DIAGNOSIS — G89.29 CHRONIC RIGHT SHOULDER PAIN: Primary | ICD-10-CM

## 2023-09-29 DIAGNOSIS — M25.511 CHRONIC RIGHT SHOULDER PAIN: Primary | ICD-10-CM

## 2023-09-29 DIAGNOSIS — R52 INCREASED PAIN: ICD-10-CM

## 2023-09-29 DIAGNOSIS — G25.89 SCAPULAR DYSKINESIS: ICD-10-CM

## 2023-09-29 DIAGNOSIS — M75.111 NONTRAUMATIC INCOMPLETE TEAR OF RIGHT ROTATOR CUFF: ICD-10-CM

## 2023-09-29 DIAGNOSIS — M89.8X1 PERISCAPULAR PAIN: ICD-10-CM

## 2023-09-29 DIAGNOSIS — G89.29 CHRONIC RIGHT SHOULDER PAIN: ICD-10-CM

## 2023-09-29 DIAGNOSIS — M25.511 CHRONIC RIGHT SHOULDER PAIN: ICD-10-CM

## 2023-09-29 PROCEDURE — 3078F DIAST BP <80 MM HG: CPT | Mod: CPTII,S$GLB,, | Performed by: PHYSICIAN ASSISTANT

## 2023-09-29 PROCEDURE — 3075F SYST BP GE 130 - 139MM HG: CPT | Mod: CPTII,S$GLB,, | Performed by: PHYSICIAN ASSISTANT

## 2023-09-29 PROCEDURE — 3078F PR MOST RECENT DIASTOLIC BLOOD PRESSURE < 80 MM HG: ICD-10-PCS | Mod: CPTII,S$GLB,, | Performed by: PHYSICIAN ASSISTANT

## 2023-09-29 PROCEDURE — 99214 PR OFFICE/OUTPT VISIT, EST, LEVL IV, 30-39 MIN: ICD-10-PCS | Mod: S$GLB,,, | Performed by: PHYSICIAN ASSISTANT

## 2023-09-29 PROCEDURE — 3075F PR MOST RECENT SYSTOLIC BLOOD PRESS GE 130-139MM HG: ICD-10-PCS | Mod: CPTII,S$GLB,, | Performed by: PHYSICIAN ASSISTANT

## 2023-09-29 PROCEDURE — 3008F PR BODY MASS INDEX (BMI) DOCUMENTED: ICD-10-PCS | Mod: CPTII,S$GLB,, | Performed by: PHYSICIAN ASSISTANT

## 2023-09-29 PROCEDURE — 1160F RVW MEDS BY RX/DR IN RCRD: CPT | Mod: CPTII,S$GLB,, | Performed by: PHYSICIAN ASSISTANT

## 2023-09-29 PROCEDURE — 1159F PR MEDICATION LIST DOCUMENTED IN MEDICAL RECORD: ICD-10-PCS | Mod: CPTII,S$GLB,, | Performed by: PHYSICIAN ASSISTANT

## 2023-09-29 PROCEDURE — 97140 MANUAL THERAPY 1/> REGIONS: CPT | Mod: PO

## 2023-09-29 PROCEDURE — 99214 OFFICE O/P EST MOD 30 MIN: CPT | Mod: S$GLB,,, | Performed by: PHYSICIAN ASSISTANT

## 2023-09-29 PROCEDURE — 1160F PR REVIEW ALL MEDS BY PRESCRIBER/CLIN PHARMACIST DOCUMENTED: ICD-10-PCS | Mod: CPTII,S$GLB,, | Performed by: PHYSICIAN ASSISTANT

## 2023-09-29 PROCEDURE — 97165 OT EVAL LOW COMPLEX 30 MIN: CPT | Mod: PO

## 2023-09-29 PROCEDURE — 99999 PR PBB SHADOW E&M-EST. PATIENT-LVL IV: CPT | Mod: PBBFAC,,, | Performed by: PHYSICIAN ASSISTANT

## 2023-09-29 PROCEDURE — 97033 APP MDLTY 1+IONTPHRSIS EA 15: CPT | Mod: PO

## 2023-09-29 PROCEDURE — 1159F MED LIST DOCD IN RCRD: CPT | Mod: CPTII,S$GLB,, | Performed by: PHYSICIAN ASSISTANT

## 2023-09-29 PROCEDURE — 99999 PR PBB SHADOW E&M-EST. PATIENT-LVL IV: ICD-10-PCS | Mod: PBBFAC,,, | Performed by: PHYSICIAN ASSISTANT

## 2023-09-29 PROCEDURE — 3008F BODY MASS INDEX DOCD: CPT | Mod: CPTII,S$GLB,, | Performed by: PHYSICIAN ASSISTANT

## 2023-09-29 RX ORDER — CELECOXIB 200 MG/1
200 CAPSULE ORAL 2 TIMES DAILY
Qty: 60 CAPSULE | Refills: 1 | Status: SHIPPED | OUTPATIENT
Start: 2023-09-29

## 2023-09-29 NOTE — PATIENT INSTRUCTIONS
Moist heat x 5 minutes prior to heat  Pain-free for all exercises                Stand holding towel  behind body, one arm above head, other arm bent behind back. With lower hand, pull gently downward. Hold _3__ seconds.  Repeat __10_ times per session. Do _3__ sessions per day.                        Stretch shoulder inward: 1. Pull arm with other hand until elbow comes to midline. 2. Extend arms in front at shoulder height, crossed at elbow, then clasp hands. Hold __3__ seconds. Repeat, switching arms.  Repeat ___10_ times. Do __3__ sessions per day.                      Scheduling information:      -We work by set appointment , 30-45 minute sessions. Please be on time for your session as we can only treat you within the time frame of your session.    - If you have any concerns with your schedule, please contact the  ([phone]). I do not have any capability to make appointment adjustment.  - Children  are NOT allowed in treatment areas, unless the child is the patient. Please find childcare prior to attending your therapy session.

## 2023-09-29 NOTE — PROGRESS NOTES
CC: RIGHT shoulder pain    Patient presents today for follow-up evaluation of right shoulder pain.  He had an MRI earlier this year which revealed a small intrasubstance rotator cuff tear with background tendinosis and biceps tendinitis.  He received a corticosteroid injection 3 months ago which only provided relief for a couple of weeks.  He denies any new injuries to the right shoulder since his last visit.  Pain seems to be most prominent at night and when reaching across his body and out to the side.  This is primarily localized to the periscapular musculature.  He denies any associated numbness or tingling of the right upper extremity.  He has been taking ibuprofen with limited relief.  Here today to discuss further treatment options.      HPI (6/15/2023):  Patient is a 42-year-old male who presents today for initial evaluation of right shoulder pain.  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  Patient reports that he initially began experiencing pain in the right shoulder approximately 2-3 weeks ago after lifting and playing with his daughter.  Since then, he has had continued pain of the shoulder despite adequate rest and conservative treatment at home.  He localizes the pain to the anterior and posterior aspects of the right shoulder without radiation.  This is worse with overhead activity, lifting, and is disruptive to sleep.  Patient reports associated weakness of the right shoulder as well.  Thus far treatment has included rest, ice compresses, and oral anti-inflammatories without relief.  He was initially evaluated for this injury in urgent care where he had x-rays which were negative for any acute fracture or dislocation, and advised to follow up with Orthopedics.    Is affecting ADLs.  Pain is 6/10 at it's worst.      Past Medical History:   Diagnosis Date    Anxiety        Past Surgical History:   Procedure Laterality Date    ANTERIOR CRUCIATE LIGAMENT REPAIR Right      KNEE ARTHROSCOPY W/ ACL RECONSTRUCTION      right    WISDOM TOOTH EXTRACTION         Family History   Problem Relation Age of Onset    Cancer Father         throat-- smoker    Hypertension Mother     Diabetes Mother     Hypertension Brother     Lupus Sister     No Known Problems Daughter     No Known Problems Son     COPD Maternal Grandmother     Eczema Daughter     Asthma Son     No Known Problems Son     Prostate cancer Neg Hx          Current Outpatient Medications:     albuterol (VENTOLIN HFA) 90 mcg/actuation inhaler, Inhale 2 puffs into the lungs every 6 (six) hours as needed for Wheezing. Rescue, Disp: 18 g, Rfl: 5    celecoxib (CELEBREX) 200 MG capsule, Take 1 capsule (200 mg total) by mouth 2 (two) times daily., Disp: 60 capsule, Rfl: 1    dexmethylphenidate (FOCALIN XR) 30 mg 24 hr capsule, Take 1 capsule by mouth every morning., Disp: , Rfl:     fluticasone-salmeterol diskus inhaler 100-50 mcg, Inhale 1 puff into the lungs 2 (two) times daily. Controller (Patient not taking: Reported on 2/27/2023), Disp: 60 each, Rfl: 11    ibuprofen (ADVIL,MOTRIN) 800 MG tablet, Take 800 mg by mouth 3 (three) times daily., Disp: , Rfl:     lisdexamfetamine (VYVANSE) 30 MG capsule, , Disp: , Rfl:     LORazepam (ATIVAN) 0.5 MG tablet, Take 1 tablet (0.5 mg total) by mouth nightly as needed for Anxiety., Disp: 30 tablet, Rfl: 2    naloxone (NARCAN) 4 mg/actuation Spry, 4mg by nasal route as needed for opioid overdose; may repeat every 2-3 minutes in alternating nostrils until medical help arrives. Call 911 (Patient not taking: Reported on 6/15/2023), Disp: 1 each, Rfl: 11    nitroglycerin (NITROGLYN) 0.2 % ointment, Apply to anorectal area 2-3 times a day (Patient not taking: Reported on 6/15/2023), Disp: 30 g, Rfl: 0    tadalafiL (CIALIS) 10 MG tablet, Take 1 tablet by mouth as needed an hour before sex. Do not take more than 1 time in 48 hours. This is not a daily medicine., Disp: 30 tablet, Rfl: 1    traMADoL (ULTRAM) 50  "mg tablet, take 1 tablet by mouth every 6 hours (Patient not taking: Reported on 2/27/2023), Disp: 45 tablet, Rfl: 3    Review of patient's allergies indicates:   Allergen Reactions    Hydrocodone Itching    Iodine      Other reaction(s): Seafood - swelling, Itch    Statins-hmg-coa reductase inhibitors      Kidney injury          REVIEW OF SYSTEMS:  Constitution: Negative. Negative for chills, fever and night sweats.   HENT: Negative for congestion and headaches.    Eyes: Negative for blurred vision, left vision loss and right vision loss.   Cardiovascular: Negative for chest pain and syncope.   Respiratory: Negative for cough and shortness of breath.    Endocrine: Negative for polydipsia, polyphagia and polyuria.   Hematologic/Lymphatic: Negative for bleeding problem. Does not bruise/bleed easily.   Skin: Negative for dry skin, itching and rash.   Musculoskeletal: Negative for falls.  Positive for right shoulder pain and muscle weakness.   Gastrointestinal: Negative for abdominal pain and bowel incontinence.   Genitourinary: Negative for bladder incontinence and nocturia.   Neurological: Negative for disturbances in coordination, loss of balance and seizures.   Psychiatric/Behavioral: Negative for depression. The patient does not have insomnia.    Allergic/Immunologic: Negative for hives and persistent infections.      PHYSICAL EXAMINATION:  Vitals:  /72   Pulse 65   Ht 6' 2" (1.88 m)   Wt 106 kg (233 lb 11 oz)   BMI 30.00 kg/m²    General: The patient is alert and oriented x 3.  Mood is pleasant.  Observation of ears, eyes and nose reveal no gross abnormalities.  No labored breathing observed.  Gait is coordinated. Patient can toe walk and heel walk without difficulty.      RIGHT SHOULDER / UPPER EXTREMITY EXAM    OBSERVATION:     Swelling  none  Deformity  none   Discoloration  none   Scapular winging none   Scars   none  Atrophy  none    TENDERNESS / CREPITUS (T/C):          T/C      T/C   Clavicle "   -/-  SUPRAspinatus    -/-     AC Jt.    -/-  INFRAspinatus  -/-    SC Jt.    -/-  Deltoid    +/-      G. Tuberosity  -/-  LH BICEP groove  -/-   Acromion:  -/-  Midline Neck   -/-     Scapular Spine -/-  Trapezium   -/-   SMA Scapula  -/-  GH jt. line - post  -/-     Scapulothoracic  +/-         ROM: (* = with pain)  Left shoulder   Right shoulder        AROM (PROM)   AROM (PROM)   FE    170° (175°)     160°* (165°*)     ER at 0°    60°  (65°)    60°*  (60°*)   ER at 90° ABD  90°  (90°)    80°*  (80°*)   IR at 90°  ABD   NA  (40°)     60*  (60°)      IR (spine level)   T10     L2*    STRENGTH: (* = with pain) Left shoulder   Right shoulder   SCAPTION   5/5    4+/5*    IR    5/5    4+/5*   ER    5/5    5/5   BICEPS   5/5    5/5*   Deltoid    5/5    5/5     SIGNS:  Painful side       NEER   -    ORACHELS  +    MOORE   +    SPEEDS  +     DROP ARM   -   BELLY PRESS neg   Superior escape none    LIFT-OFF  +   X-Body ADD    neg    MOVING VALGUS neg        STABILITY TESTING    Left shoulder   Right shoulder    Translation     Anterior  up face    up face    Posterior  up face    up face    Sulcus   < 10mm   < 10 mm     Signs   Apprehension   neg      neg       Relocation   no change     no change      Jerk test  neg     neg    EXTREMITY NEURO-VASCULAR EXAM:    Sensation grossly intact to light touch all dermatomal regions.    DTR 2+ Biceps, Triceps, BR and Negative Elianas sign   Grossly intact motor function at Elbow, Wrist and Hand   Distal pulses radial and ulnar 2+, brisk cap refill, symmetric.      NECK:  Painless FROM and spinous processes non-tender. Negative Spurlings sign.      OTHER FINDINGS:  + scapular dyskinesia    XRAYS right shoulder (6/12/2023):  FINDINGS:  Four views right shoulder.     The right humeral head maintains appropriate relationship with the glenoid.  The acromioclavicular joint is intact.  No acute displaced right rib fracture.  The right lung zones are clear.  There may be  changes of calcific tendinosis.    MRI right shoulder (6/19/2023):  Impression:     1. Tiny intrasubstance tear of insertional infraspinatus tendon.  Supraspinatus and subscapularis tendinosis.  Rotator cuff muscle bulk is maintained.  2. Long head biceps tendinosis and tenosynovitis.  3. Questionable irregularity of the superior labrum, suboptimally characterized without intra-articular contrast.      ASSESSMENT:     Right shoulder pain  Rotator cuff tendinosis with small intrasubstance tear, right  Biceps tendinopathy, right  Scapular dyskinesis and periscapular pain      PLAN:      Prior imaging reviewed and discussed with patient in detail.    We discussed at length different treatment options including conservative vs surgical management. These include anti-inflammatories, acetaminophen, rest, ice, heat, formal physical therapy including strengthening and stretching exercises, home exercise programs, dry needling, corticosteroid injections, and finally surgical intervention.      Prescription for Celebrex 200 mg 1 p.o. b.i.d. p.r.n. pain provided today.  Advised patient to refrain from taking over-the-counter anti-inflammatories while taking Celebrex, however may alternate with acetaminophen if needed.  Continue to rest ice the right shoulder to reduce pain/inflammation.    Referral to formal physical therapy placed today.    Follow up in 2 months      All questions were answered, patient will contact us for questions or concerns in the interim.      Medical Dictation software was used during the dictation of portions or the entirety of this medical record.  Phonetic or grammatic errors may exist due to the use of this software. For clarification, refer to the author of the document.

## 2023-09-29 NOTE — PLAN OF CARE
[facility] Shoulder initial Evaluation         Date: 9/29/2023  Name: Slime Bradshaw  Clinic Number: [id number]    Therapy Diagnosis:   Encounter Diagnoses   Name Primary?    Chronic right shoulder pain     Scapular dyskinesis     Periscapular pain     Increased pain      Physician: Marcellus Mancuso, ELICEO    Physician Orders: Evaluate and treat ; 2x/wk x 6 wks , Modalities prn  Medical Diagnosis:   M25.511,G89.29 (ICD-10-CM) - Chronic right shoulder pain   G25.89 (ICD-10-CM) - Scapular dyskinesis   M89.8X1 (ICD-10-CM) - Periscapular pain     Evaluation Date: 9/29/2023   Insurance Authorization Expiration date : 9/29/2023 - 9/28/2024  Plan of Care Certification Period: 12/22/2023  Date of Return to MD: 11/29/2023    Visit # / Visits authorized: 1 / 1  Time In:2:15  Time Out: 3:25  Total Billable Time: 70 minutes    Precautions:  Standard and Weightbearing      Subjective       Involved Side: Right shoulder  Dominant Side: Right  Date of Onset: early June 2023  History of Current Condition/Mechanism of Injury: Pt reports that after he his one Cortizone shot during the summer, he was okay for a short period. He has difficulty sleeping. He is taking OTC medications. He reports that his pain began after lifting and playing with his daughter.       Imaging: MRI on 6- 1. Tiny intrasubstance tear of insertional infraspinatus tendon.  Supraspinatus and subscapularis tendinosis.  Rotator cuff muscle bulk is maintained.  2. Long head biceps tendinosis and tenosynovitis.  3. Questionable irregularity of the superior labrum, suboptimally characterized without intra-articular contrast.   X-ray on 6/12/2023 were negative for any acute fracture or dislocation.     Surgical Procedure and Date: None    Past Medical History/Physical Systems Review:   Slime Bradshaw  has a past medical history of Anxiety.    Slime Bradshaw  has a past surgical  history that includes Knee arthroscopy w/ ACL reconstruction; [place] tooth extraction; and Anterior cruciate ligament repair (Right).    Slime has a current medication list which includes the following prescription(s): albuterol, celecoxib, dexmethylphenidate, fluticasone-salmeterol 100-50 mcg/dose, ibuprofen, lisdexamfetamine, lorazepam, naloxone, nitroglycerin, tadalafil, and tramadol.    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     Functional Limitations/Social History:    Previous functional status includes: Independent with all ADLs.     Current FunctionalStatus   Home/Living environment : lives with their family      Limitation of Functional Status as follows:   ADLs/IADLs:     - patient is independent with self care needs  however, requires increased time. General weakness and describes lower endurance.        Objective       Pain:  During no work: 1/10  While working: 3/10  Sleepin/10 wakes up frequently a night due to pain   Location of pain: anterior and posterior aspects of the right shoulder     Slime's goals for therapy are: to increase motion with reaching and be pain free with daily task       Objective:  Sensation Test: Patient denies any numbness/tingling    Observation/Inspection   Left Right   Anatomic Symmetry normal Slightly abducted   Bony normal normal   Suparspinatus normal normal   Infraspinatus normal normal   Rhomboid normal normal     Observation/Inspection:  neither scapular elevation with attempted shoulder ROM  nor increased join play/mobility nor protracted scapula, 90 degrees flexion scapula retracts      Range of Motion:   Right: limited as follows: (see measurements table below)  Left: WNL     (R) UE     AROM Norm   Shoulder Flexion 92 180   Shoulder Abduction 133 0-180   Shoulder Extension 58 0-50   Shoulder Internal Rotation Back IR T-11 0-90   Shoulder External Rotation Full- pain at end range  0-90   Horz Shoulder Adduction 29 0-40     ROM Comments:    Soft end feel +   Painful arc at 90 degrees in flexion that increases with initiation of movement   Concentric  pain    Eccentric pain    Pain at end range     Painful Arc:   Patient demonstrates painful arc in shoulder flexion or abduction.  Through Flexion: Elevation 90 Degrees    Special Tests:  Positive: Int. Rot. and ADD. Impingement, Empty can test, and lift off sign    Palpation: (for pain)     Positive: Posterior Subacromial Space    Limitations of Functional Status:   Self Care: requires increase time to don clothes and reaching overhead    Treatment     Treatment Time In/out  (separate from total time) : 30 minutes at the end of the hour     - Went over HEP and pt demos good understanding  -CHT performed  Instrument Assisted Soft Tissue Mobilization  stimulating tissue turnover, scar tissue resorption, and the regeneration of tendons, cross friction massage of supraspintus tendon and infraspinatus tendon attachment and throughout musculature  x 8  minutes    -Slime  received a 4 hour extended wear Iontophoresis patch for pain control and decreased inflammation with 1.0 cc Dexamethasone applied to supraspintus tendon and infraspinatus tendon. Patient educated on wear time and precautions voicing  understanding and compliance.        Home Exercise Program/Education:  Issued HEP (see patient instructions in EMR) and educated on modality use for pain management . Exercises were reviewed and Slime was able to demonstrate them prior to the end of the session.    Pt received a written copy of exercises to perform at home. Slime demonstrated good  understanding of the education provided.  Pt was advised to perform these exercises free of pain, and to stop performing them if pain occurs.    Patient/Family Education: role of OT, goals for OT, double booking , scheduling/cancellations - pt verbalized understanding. Discussed insurance limitations with patient.    Additional Education provided: role of CHT/OT, goals for CHT/OT, discussed insurance limitation with patient- patient verbalized understanding         Intake Outcome Measure for FOTO Survey    Therapist reviewed FOTO scores for Slime Bradshaw on 9/29/2023.   FOTO documents entered into [employer] - see Media section.    Intake Score: 53           Medical Necessity is demonstrated by the following  Occupational Profile/History  Co-morbidities and personal factors that may impact the plan of care [x] LOW: Brief chart review  [] MODERATE: Expanded chart review   [] HIGH: Extensive chart review    Moderate / High Support Documentation:      Examination  Performance deficits relating to physical, cognitive or psychosocial skills that result in activity limitations and/or participation restrictions  [x] LOW: addressing 1-3 Performance deficits  [] MODERATE: 3-5 Performance deficits  [] HIGH: 5+ Performance deficits (please support below)    Moderate / High Support Documentation:    Physical:  Joint Mobility  Muscle Power/Strength  Muscle Endurance  Pain    Cognitive:  No Deficits    Psychosocial:    No Deficits     Treatment Options [x] LOW: Limited options  [] MODERATE: Several options  [] HIGH: Multiple options      Decision Making/ Complexity Score: low         Assessment  This 43 y.o. male referred to Outpatient Occupational Therapy with diagnosis of   Encounter Diagnoses   Name Primary?    Chronic right shoulder pain     Scapular dyskinesis     Periscapular pain     Increased pain     presents with limitations as  "described in problem list. Patient can benefit from Occupational Therapy services for Iontophoresis, Ultrasound, moist heat, AROM, Theraputic exercises, joint mobs, home exercise program provied with written instructions, strengthening, Theraband Ex, UBE, and pulley ex in order to maximize painfree functional use of  right UE. . The following goals were discussed with the patient and he is in agreement with them as to be addressed in the treatment plan.     Problem List:   Decreased ROM, Increased pain, and Difficulty sleeping    Goals to be met in 6-8 weeks:  1) Pt will be independent and report performing HEP to maximize (R) shoulder functional capacity.  2) Pt will increase shoulder AROM in all limited planes of motion by 10-15 degrees to A with daily task.  3) Pt will report use of home modalities for pain management.  4) Pt will report one degree less of pain with nonuse and with use.  5) Pt will report interrupted sleep no more than twice a night.  6) Pt will increase FOTO limitation score by 30 points to increase their hand/wrist functional ability.      Plan  Slime to be treated by Occupational Therapy  1-2  times per week for 6-8 weeks to achieve the established goals.   Treatment to include Iontophoresis w/ 1.2 cc's of dexamethasone, Ultrasoud @ 3mHz, Ice, Moist heat, Strengthening Theraband Ex, UBE , and E- stim as well as any other treatments deemed necessary based on the patient's needs or progress.     ELLEN Barker    "I, Bessie SIM CHT,  certify that I was present in the room directing the student in service delivery and guiding them using my skilled judgment. As the co-signing therapist, I have reviewed the student's documentation and am responsible for the treatment, assessment and plan."       Bessie Cameron, OT MOT, OTR/L, CHT  Occupational therapist, Certified Hand Therapist    "

## 2023-11-01 ENCOUNTER — DOCUMENTATION ONLY (OUTPATIENT)
Dept: REHABILITATION | Facility: HOSPITAL | Age: 43
End: 2023-11-01

## 2023-11-03 ENCOUNTER — DOCUMENTATION ONLY (OUTPATIENT)
Dept: REHABILITATION | Facility: HOSPITAL | Age: 43
End: 2023-11-03
Payer: MEDICAID

## 2024-02-07 ENCOUNTER — PATIENT MESSAGE (OUTPATIENT)
Dept: FAMILY MEDICINE | Facility: CLINIC | Age: 44
End: 2024-02-07
Payer: MEDICAID

## 2024-03-05 ENCOUNTER — PATIENT MESSAGE (OUTPATIENT)
Dept: ADMINISTRATIVE | Facility: HOSPITAL | Age: 44
End: 2024-03-05
Payer: MEDICAID

## 2025-03-24 ENCOUNTER — PATIENT OUTREACH (OUTPATIENT)
Dept: ADMINISTRATIVE | Facility: HOSPITAL | Age: 45
End: 2025-03-24
Payer: MEDICAID

## 2025-03-24 NOTE — PROGRESS NOTES
Left message to schedule annual visit with PCP  HM and immunization's reviewed and updated.  DUE FOR Lipid, Hg A1C.  IF ALREADY DONE, NEED TO GET RECORDS INFORMATION.